# Patient Record
Sex: FEMALE | Race: WHITE | NOT HISPANIC OR LATINO | Employment: UNEMPLOYED | URBAN - METROPOLITAN AREA
[De-identification: names, ages, dates, MRNs, and addresses within clinical notes are randomized per-mention and may not be internally consistent; named-entity substitution may affect disease eponyms.]

---

## 2017-11-24 ENCOUNTER — APPOINTMENT (EMERGENCY)
Dept: RADIOLOGY | Facility: HOSPITAL | Age: 42
End: 2017-11-24
Payer: COMMERCIAL

## 2017-11-24 ENCOUNTER — HOSPITAL ENCOUNTER (EMERGENCY)
Facility: HOSPITAL | Age: 42
Discharge: HOME | End: 2017-11-24
Attending: EMERGENCY MEDICINE
Payer: COMMERCIAL

## 2017-11-24 VITALS
WEIGHT: 245 LBS | TEMPERATURE: 98.4 F | SYSTOLIC BLOOD PRESSURE: 147 MMHG | OXYGEN SATURATION: 96 % | DIASTOLIC BLOOD PRESSURE: 86 MMHG | HEART RATE: 89 BPM | RESPIRATION RATE: 16 BRPM

## 2017-11-24 DIAGNOSIS — M79.672 LEFT FOOT PAIN: Primary | ICD-10-CM

## 2017-11-24 PROCEDURE — 73630 X-RAY EXAM OF FOOT: CPT

## 2017-11-24 PROCEDURE — 99283 EMERGENCY DEPT VISIT LOW MDM: CPT

## 2017-11-24 NOTE — DISCHARGE INSTRUCTIONS
Arthralgia   WHAT YOU NEED TO KNOW:   Arthralgia is pain in one or more joints, with no inflammation  It may be short-term and get better within 6 to 8 weeks  Arthralgia can be an early sign of arthritis  Arthralgia may be caused by a medical condition, such as a hormone disorder or a tumor  It may also be caused by an infection or injury  DISCHARGE INSTRUCTIONS:   Medicines: The following medicines may  be ordered for you:  · Acetaminophen  decreases pain  Ask how much to take and how often to take it  Follow directions  Acetaminophen can cause liver damage if not taken correctly  · NSAIDs  decrease pain and prevent swelling  Ask your healthcare provider which medicine is right for you  Ask how much to take and when to take it  Take as directed  NSAIDs can cause stomach bleeding and kidney problems if not taken correctly  · Pain relief cream  decreases pain  Use this cream as directed  · Take your medicine as directed  Contact your healthcare provider if you think your medicine is not helping or if you have side effects  Tell him of her if you are allergic to any medicine  Keep a list of the medicines, vitamins, and herbs you take  Include the amounts, and when and why you take them  Bring the list or the pill bottles to follow-up visits  Carry your medicine list with you in case of an emergency  Follow up with your healthcare provider or specialist as directed:  Write down your questions so you remember to ask them during your visits  Self-care:   · Apply heat  to help decrease pain  Use a heating pad or heat wrap  Apply heat for 20 to 30 minutes every 2 hours for as many days as directed  · Rest  as much as possible  Avoid activities that cause joint pain  · Apply ice  to help decrease swelling and pain  Ice may also help prevent tissue damage  Use an ice pack, or put crushed ice in a plastic bag   Cover it with a towel and place it on your painful joint for 15 to 20 minutes every hour or as directed  · Support  the joint with a brace or elastic wrap as directed  · Elevate  your joint above the level of your heart as often as you can to help decrease swelling and pain  Prop your painful joint on pillows or blankets to keep it elevated comfortably  · Lose weight  if you are overweight  Extra weight can put pressure on your joints and cause more pain  Ask your healthcare provider how much you should weigh  Ask him to help you create a weight loss plan  · Exercise  regularly to help improve joint movement and to decrease pain  Ask about the best exercise plan for you  Low-impact exercises can help take the pressure off your joints  Examples are walking, swimming, and water aerobics  Physical therapy:  A physical therapist teaches you exercises to help improve movement and strength, and to decrease pain  Ask your healthcare provider if physical therapy is right for you  Contact your healthcare provider or specialist if:   · You have a fever  · You continue to have joint pain that cannot be relieved with heat, ice, or medicine  · You have pain and inflammation around your joint  · You have questions or concerns about your condition or care  Return to the emergency department if:   · You have sudden, severe pain when you move your joint  · You have a fever and shaking chills  · You cannot move your joint  · You lose feeling on the side of your body where you have the painful joint  © 2017 [address]  Information is for End User's use only and may not be sold, redistributed or otherwise used for commercial purposes  All illustrations and images included in CareNotes® are the copyrighted property of [employer]  or [employer]h Foley  The above information is an  only  It is not intended as medical advice for individual conditions or treatments   Talk to your doctor, nurse or pharmacist before following any medical regimen to see if it is safe and effective for you  Metatarsalgia   WHAT YOU NEED TO KNOW:   What is metatarsalgia? Metatarsalgia is pain in the ball of your foot, near your second, third, and fourth toes  What causes or increases my risk for metatarsalgia? · Playing weight-bearing sports, such as tennis or running    · Wearing high heels, or narrow or tight shoes    · Being overweight    · Rheumatoid arthritis, osteoarthritis, or gout    · Foot deformities such as hammer toe or Achilles tendon problems    · Trauma such as a tear in the area where you have pain, or a stress fracture in your foot  What are the signs and symptoms of metatarsalgia? Symptoms usually develop over time, but you may have sudden pain from an injury  You may have any of the following:  · Pain at the ball of your foot or near your toes that gets worse when you walk or stand, especially on hard surfaces    · Pain during exercises such as running    · Sharp or shooting pain in your toes that may get worse when you flex your toes    · Tingling or numbness in your toes    · Feeling like you are walking over rocks, or that you have a bruise on your toe    · A change in the way you walk because you try to avoid putting pressure on the ball of your foot  How is metatarsalgia diagnosed and treated? Your healthcare provider will examine your feet and legs as you stand and walk  X-ray, CT, or ultrasound pictures may show a problem with your foot, such as a fracture  You may be given contrast liquid to help foot problems show up better in the pictures  Tell the healthcare provider if you have ever had an allergic reaction to contrast liquid  The cause of your metatarsalgia will be treated, if possible  You may also need any of the following:  · NSAIDs , such as ibuprofen, help decrease swelling, pain, and fever  This medicine is available with or without a doctor's order  NSAIDs can cause stomach bleeding or kidney problems in certain people   If you take blood thinner medicine, always ask if NSAIDs are safe for you  Always read the medicine label and follow directions  Do not give these medicines to children under 10months of age without direction from your child's healthcare provider  · Ultrasound  may be used to relieve your pain  Sound waves from the ultrasound can help send heat deeper into your tissues  · A steroid injection  may help decrease inflammation  · Surgery  may be needed if other treatments do not work  Surgery may be used to align the bones near your toes  You may also need surgery to fix a problem such as hammertoe  What can I do to manage or prevent metatarsalgia? · Rest your foot  If you play sports, you may not be able to do weight-bearing exercises  Examples include swimming and bike riding  Ask your healthcare provider which exercises are safe for you  · Apply ice as directed  Ice helps reduce pain and swelling  Use an ice pack, or put crushed ice in a plastic bag  Cover the pack or bag with a towel before you apply it to your foot  Apply ice for 15 to 20 minutes every hour, or as directed  · Use a cane or crutch if directed  These devices may help take pressure off your foot while it heals  · Wear proper shoes  Do not wear shoes that are narrow or tight  You may need to wear shoes that are wider than you usually wear  Choose shoes that do not have a raised heel  Shock-absorbing shoes can help prevent injury  These shoes will have extra support under your feet and toes  You can also add shoe cushions inside your shoes or to the bottoms of your feet, near your toes  The cushions may provide more support and make walking or standing more comfortable  Arch supports may help take pressure off your toes  · Reach or maintain a healthy weight  Extra weight can put pressure on your feet  Talk to your healthcare provider about a healthy weight for you   Your provider can help you create a safe weight loss plan if you are overweight  · Go to physical therapy if directed  A physical therapist can help improve your strength and range of motion  The therapist can also help you improve the way you walk to prevent metatarsalgia from happening again  Your therapist can also teach you exercises to help relieve your pain  When should I contact my healthcare provider? · You develop knee, back, or hip pain  · You have more pain or redness in the foot  · You have questions or concerns about your condition or care  CARE AGREEMENT:   You have the right to help plan your care  Learn about your health condition and how it may be treated  Discuss treatment options with your caregivers to decide what care you want to receive  You always have the right to refuse treatment  The above information is an  only  It is not intended as medical advice for individual conditions or treatments  Talk to your doctor, nurse or pharmacist before following any medical regimen to see if it is safe and effective for you  © 2017 [address] Information is for End User's use only and may not be sold, redistributed or otherwise used for commercial purposes  All illustrations and images included in CareNotes® are the copyrighted property of A [employer] M [employer] Inc  or [employer] F[employer]

## 2017-11-24 NOTE — ED PROVIDER NOTES
History  Chief Complaint   Patient presents with    Foot Pain     Left foot pain and swelling gradually worsening for 4-5 days  Pain across the top of the foot- walks a lot at work [employer])     47-year-old female presents for evaluation of left foot pain that started 5 days ago  Patient reports that she walks around a lot work and [employer] her feet around"  Reports pain with walking as well as without movement  Denies fall or injury  States that she injured her right foot and on occasion compensates by using her left foot  Patient noted swelling over the dorsum of her foot, pain mostly in the 2 - 4 th metatarsals the radiates up to her ankle  Denies numbness, paresthesias, weakness, injury, open wounds, f/c, n/v              None       History reviewed  No pertinent past medical history  History reviewed  No pertinent surgical history  History reviewed  No pertinent family history  I have reviewed and agree with the history as documented  Social History   Substance Use Topics    Smoking status: Current Every Day Smoker     Packs/day: 0 50     Types: Cigarettes    Smokeless tobacco: Never Used    Alcohol use No        Review of Systems   Constitutional: Negative for activity change, chills and fever  Gastrointestinal: Negative for nausea and vomiting  Musculoskeletal: Positive for arthralgias, joint swelling and myalgias  Skin: Negative          Physical Exam  ED Triage Vitals   Temperature Pulse Respirations Blood Pressure SpO2   11/24/17 0818 11/24/17 0818 11/24/17 0818 11/24/17 0821 11/24/17 0818   98 4 °F (36 9 °C) 89 16 147/86 96 %      Temp Source Heart Rate Source Patient Position - Orthostatic VS BP Location FiO2 (%)   11/24/17 0818 -- -- -- --   Temporal          Pain Score       11/24/17 0818       Worst Possible Pain           Orthostatic Vital Signs  Vitals:    11/24/17 0818 11/24/17 0821   BP:  147/86   Pulse: 89        Physical Exam   Constitutional: She appears well-developed and well-nourished  No distress  Pulmonary/Chest: Effort normal and breath sounds normal    Musculoskeletal: Normal range of motion  She exhibits no edema, tenderness or deformity  Left foot: There is swelling  There is normal range of motion, no bony tenderness, normal capillary refill and no laceration  Feet:    Pt able to ambulate in room  No limping noted  F AROM of ankles and toes b/l  NVI  DP pulses 2+ Swelling noted, however no discoloration, wounds  Skin: Skin is warm  Capillary refill takes less than 2 seconds  No rash noted  She is not diaphoretic  No erythema  Psychiatric: She has a normal mood and affect  Vitals reviewed  ED Medications  Medications - No data to display    Diagnostic Studies  Results Reviewed     None                 XR foot 3+ views LEFT   Final Result by Sarthak Salamanca MD (11/24 9836)      No acute osseous abnormality  Workstation performed: [id number]                    Procedures  Procedures       Phone Contacts  ED Phone Contact    ED Course  ED Course                                MDM  Number of Diagnoses or Management Options  Diagnosis management comments: 68-year-old female presents for evaluation of left elbow pain that started 5 days ago  Patient is well-appearing in room, vital signs stable  Patient is able to ambulate  X-ray results negative in any fractures  Will advise RICE, motrin prn for pain  F/u with podiatry if sxs persist  Pt understands and agrees to plan          Amount and/or Complexity of Data Reviewed  Tests in the radiology section of CPT®: ordered and reviewed    Patient Progress  Patient progress: stable    CritCare Time    Disposition  Final diagnoses:   Left foot pain     Time reflects when diagnosis was documented in both MDM as applicable and the Disposition within this note     Time User Action Codes Description Comment    11/24/2017  9:27 AM Arian Stewart Add [U82 112] Left foot pain       ED Disposition     ED Disposition Condition Comment    Discharge  Cassandra Ruby discharge to home/self care  Condition at discharge: Good        Follow-up Information     Follow up With Specialties Details Why [id number]  [facility] Schedule an appointment as soon as possible for a visit If symptoms worsen [place] [phone]        Patient's Medications    No medications on file     No discharge procedures on file      ED Provider  Electronically Signed by           Savannah Dong PA-C  11/24/17 5589

## 2018-04-14 ENCOUNTER — APPOINTMENT (EMERGENCY)
Dept: RADIOLOGY | Facility: HOSPITAL | Age: 43
End: 2018-04-14
Payer: COMMERCIAL

## 2018-04-14 ENCOUNTER — HOSPITAL ENCOUNTER (EMERGENCY)
Facility: HOSPITAL | Age: 43
Discharge: HOME | End: 2018-04-14
Attending: EMERGENCY MEDICINE | Admitting: EMERGENCY MEDICINE
Payer: COMMERCIAL

## 2018-04-14 VITALS
WEIGHT: 254 LBS | RESPIRATION RATE: 18 BRPM | DIASTOLIC BLOOD PRESSURE: 99 MMHG | HEART RATE: 82 BPM | OXYGEN SATURATION: 98 % | SYSTOLIC BLOOD PRESSURE: 167 MMHG | TEMPERATURE: 98.8 F

## 2018-04-14 DIAGNOSIS — M19.90 OSTEOARTHRITIS: Primary | ICD-10-CM

## 2018-04-14 DIAGNOSIS — M25.562 LEFT KNEE PAIN: ICD-10-CM

## 2018-04-14 PROCEDURE — 73564 X-RAY EXAM KNEE 4 OR MORE: CPT

## 2018-04-14 PROCEDURE — 99283 EMERGENCY DEPT VISIT LOW MDM: CPT

## 2018-04-14 RX ORDER — FAMOTIDINE 20 MG/1
20 TABLET, FILM COATED ORAL 2 TIMES DAILY
Qty: 30 TABLET | Refills: 0 | Status: SHIPPED | OUTPATIENT
Start: 2018-04-14 | End: 2018-11-20

## 2018-04-14 RX ORDER — MELOXICAM 15 MG/1
15 TABLET ORAL DAILY
Qty: 15 TABLET | Refills: 0 | Status: SHIPPED | OUTPATIENT
Start: 2018-04-14 | End: 2018-11-20

## 2018-04-14 RX ORDER — OXYCODONE HYDROCHLORIDE AND ACETAMINOPHEN 5; 325 MG/1; MG/1
1 TABLET ORAL EVERY 6 HOURS PRN
Qty: 15 TABLET | Refills: 0 | Status: SHIPPED | OUTPATIENT
Start: 2018-04-14 | End: 2018-11-20

## 2018-04-14 RX ORDER — OXYCODONE HYDROCHLORIDE AND ACETAMINOPHEN 5; 325 MG/1; MG/1
1 TABLET ORAL ONCE
Status: COMPLETED | OUTPATIENT
Start: 2018-04-14 | End: 2018-04-14

## 2018-04-14 RX ORDER — LIDOCAINE 50 MG/G
1 PATCH TOPICAL ONCE
Status: DISCONTINUED | OUTPATIENT
Start: 2018-04-14 | End: 2018-04-15 | Stop reason: HOSPADM

## 2018-04-14 RX ORDER — LIDOCAINE 50 MG/G
1 PATCH TOPICAL DAILY
Qty: 15 PATCH | Refills: 0 | Status: SHIPPED | OUTPATIENT
Start: 2018-04-14 | End: 2018-11-20

## 2018-04-14 RX ADMIN — OXYCODONE HYDROCHLORIDE AND ACETAMINOPHEN 1 TABLET: 5; 325 TABLET ORAL at 22:16

## 2018-04-14 RX ADMIN — LIDOCAINE 1 PATCH: 50 PATCH TOPICAL at 22:15

## 2018-04-15 NOTE — ED PROVIDER NOTES
History  Chief Complaint   Patient presents with    Knee Pain     With left knee pain x3 weeks states felt like she extended it when steeping down       History provided by:  Patient   used: No    Leg Pain   Location:  Knee  Time since incident:  3 weeks  Injury: no    Knee location:  L knee  Pain details:     Quality:  Aching, pressure and throbbing    Radiates to:  Does not radiate    Severity:  Moderate    Onset quality:  Gradual    Timing:  Constant    Progression:  Worsening  Chronicity:  New  Dislocation: no    Foreign body present:  No foreign bodies  Prior injury to area:  No  Relieved by:  Nothing  Worsened by:  Extension (inactivity)  Ineffective treatments:  Acetaminophen, ice, elevation, heat, movement, NSAIDs and rest  Associated symptoms: decreased ROM and swelling    Associated symptoms: no back pain, no fever, no muscle weakness and no tingling    Risk factors: no frequent fractures, no known bone disorder and no recent illness        None       History reviewed  No pertinent past medical history  Past Surgical History:   Procedure Laterality Date    ABDOMINAL SURGERY       SECTION      CHOLECYSTECTOMY         History reviewed  No pertinent family history  I have reviewed and agree with the history as documented  Social History   Substance Use Topics    Smoking status: Current Every Day Smoker     Packs/day: 0 50     Types: Cigarettes    Smokeless tobacco: Never Used    Alcohol use No        Review of Systems   Constitutional: Negative for chills and fever  Cardiovascular: Negative for leg swelling  Musculoskeletal: Positive for arthralgias and joint swelling  Negative for back pain  Skin: Negative for color change, pallor, rash and wound  Allergic/Immunologic: Negative for immunocompromised state  Neurological: Negative for weakness and numbness  All other systems reviewed and are negative        Physical Exam  ED Triage Vitals [18] Temperature Pulse Respirations Blood Pressure SpO2   98 8 °F (37 1 °C) 89 20 (!) 183/86 98 %      Temp Source Heart Rate Source Patient Position - Orthostatic VS BP Location FiO2 (%)   Oral Monitor Sitting Right arm --      Pain Score       Worst Possible Pain           Orthostatic Vital Signs  Vitals:    04/14/18 2042 04/14/18 2210   BP: (!) 183/86 167/99   Pulse: 89 82   Patient Position - Orthostatic VS: Sitting Sitting       Physical Exam   Constitutional: She is oriented to person, place, and time  She appears well-developed and well-nourished  No distress  HENT:   Head: Normocephalic and atraumatic  Eyes: Right eye exhibits no discharge  Left eye exhibits no discharge  Cardiovascular: Intact distal pulses  Pulmonary/Chest: Effort normal  No stridor  Musculoskeletal: She exhibits tenderness  She exhibits no edema or deformity  Left knee: She exhibits decreased range of motion, swelling and effusion  She exhibits no ecchymosis, no deformity, no erythema and normal patellar mobility  Tenderness found  Medial joint line, lateral joint line and patellar tendon tenderness noted  Neurological: She is alert and oriented to person, place, and time  Skin: Skin is warm and dry  She is not diaphoretic  Psychiatric: She has a normal mood and affect  Nursing note and vitals reviewed  ED Medications  Medications   oxyCODONE-acetaminophen (PERCOCET) 5-325 mg per tablet 1 tablet (1 tablet Oral Given 4/14/18 2216)       Diagnostic Studies  Results Reviewed     None                 XR knee 4+ vw left injury   Final Result by Edmar Rg MD (04/14 2142)         1  No acute osseous abnormality  2   Degenerative changes as described              Workstation performed: [id number]                    Procedures  Procedures       Phone Contacts  ED Phone Contact    ED Course  ED Course                                MDM  Number of Diagnoses or Management Options  Left knee pain: new and requires workup  Osteoarthritis: new and requires workup  Diagnosis management comments: Patient is a 57-year-old female that presents for left knee pain that is been ongoing for the past 3 weeks  It is worsening now  No known injury  X-ray shows osteoarthritis  Patient does have a small effusion on x-ray  Her joint is slightly swollen compared to the opposite  Unable to do full ligament testing because of pain  No calf tenderness or lower extremity edema  Patellar tendon appears intact  Patient can bend her leg but it does cause some pain  Quadriceps tendon appears intact as well  Talked to the patient at length about further treatment and follow-up  This seems to be related to her arthritis but I did discuss other problems that can cause internal derangement such as cartilage and ligament injuries  No clinical evidence of a DVT  Will start with supportive treatment and have her follow up with Orthopedics  Patient states that it is hard for her to extend her knee which is probably from her small effusion  I do not feel that a knee immobilizer will give her benefit  She states that her pain seems to improve as she walks around and that hurts again  This history fits with osteoarthritis that is seen on the x-ray         Amount and/or Complexity of Data Reviewed  Tests in the radiology section of CPT®: ordered and reviewed  Independent visualization of images, tracings, or specimens: yes    Patient Progress  Patient progress: stable    CritCare Time    Disposition  Final diagnoses:   Osteoarthritis   Left knee pain     Time reflects when diagnosis was documented in both MDM as applicable and the Disposition within this note     Time User Action Codes Description Comment    4/14/2018 10:05 PM Brad Casey Add [M19 90] Osteoarthritis     4/14/2018 10:06 PM Brad Casey Add [M25 562] Left knee pain       ED Disposition     ED Disposition Condition Comment    Discharge  Carolyne Murphy discharge to home/self care  Condition at discharge: Good        Follow-up Information     Follow up With Specialties Details Why [address] [facility] Call in 1 day If symptoms persist, To schedule an appointment as soon as you can [place] [phone]        Discharge Medication List as of 4/14/2018 10:07 PM      START taking these medications    Details   famotidine (PEPCID) 20 mg tablet Take 1 tablet (20 mg total) by mouth 2 (two) times a day, Starting Sat 4/14/2018, Print      lidocaine (LIDODERM) 5 % Place 1 patch on the skin daily Remove & Discard patch within 12 hours or as directed by MD, Starting Sat 4/14/2018, Print      meloxicam (MOBIC) 15 mg tablet Take 1 tablet (15 mg total) by mouth daily for 15 days, Starting Sat 4/14/2018, Until Sun 4/29/2018, Print      oxyCODONE-acetaminophen (PERCOCET) 5-325 mg per tablet Take 1 tablet by mouth every 6 (six) hours as needed for moderate pain Max Daily Amount: 4 tablets, Starting Sat 4/14/2018, Print           No discharge procedures on file      ED Provider  Electronically Signed by           Jaci Palma DO  04/15/18 0331

## 2018-04-15 NOTE — DISCHARGE INSTRUCTIONS
Arthralgia   WHAT YOU NEED TO KNOW:   Arthralgia is pain in one or more joints, with no inflammation  It may be short-term and get better within 6 to 8 weeks  Arthralgia can be an early sign of arthritis  Arthralgia may be caused by a medical condition, such as a hormone disorder or a tumor  It may also be caused by an infection or injury  DISCHARGE INSTRUCTIONS:   Medicines: The following medicines may  be ordered for you:  · Acetaminophen  decreases pain  Ask how much to take and how often to take it  Follow directions  Acetaminophen can cause liver damage if not taken correctly  · NSAIDs  decrease pain and prevent swelling  Ask your healthcare provider which medicine is right for you  Ask how much to take and when to take it  Take as directed  NSAIDs can cause stomach bleeding and kidney problems if not taken correctly  · Pain relief cream  decreases pain  Use this cream as directed  · Take your medicine as directed  Contact your healthcare provider if you think your medicine is not helping or if you have side effects  Tell him of her if you are allergic to any medicine  Keep a list of the medicines, vitamins, and herbs you take  Include the amounts, and when and why you take them  Bring the list or the pill bottles to follow-up visits  Carry your medicine list with you in case of an emergency  Follow up with your healthcare provider or specialist as directed:  Write down your questions so you remember to ask them during your visits  Self-care:   · Apply heat  to help decrease pain  Use a heating pad or heat wrap  Apply heat for 20 to 30 minutes every 2 hours for as many days as directed  · Rest  as much as possible  Avoid activities that cause joint pain  · Apply ice  to help decrease swelling and pain  Ice may also help prevent tissue damage  Use an ice pack, or put crushed ice in a plastic bag   Cover it with a towel and place it on your painful joint for 15 to 20 minutes every hour or as directed  · Support  the joint with a brace or elastic wrap as directed  · Elevate  your joint above the level of your heart as often as you can to help decrease swelling and pain  Prop your painful joint on pillows or blankets to keep it elevated comfortably  · Lose weight  if you are overweight  Extra weight can put pressure on your joints and cause more pain  Ask your healthcare provider how much you should weigh  Ask him to help you create a weight loss plan  · Exercise  regularly to help improve joint movement and to decrease pain  Ask about the best exercise plan for you  Low-impact exercises can help take the pressure off your joints  Examples are walking, swimming, and water aerobics  Physical therapy:  A physical therapist teaches you exercises to help improve movement and strength, and to decrease pain  Ask your healthcare provider if physical therapy is right for you  Contact your healthcare provider or specialist if:   · You have a fever  · You continue to have joint pain that cannot be relieved with heat, ice, or medicine  · You have pain and inflammation around your joint  · You have questions or concerns about your condition or care  Return to the emergency department if:   · You have sudden, severe pain when you move your joint  · You have a fever and shaking chills  · You cannot move your joint  · You lose feeling on the side of your body where you have the painful joint  © 2017 [address]  Information is for End User's use only and may not be sold, redistributed or otherwise used for commercial purposes  All illustrations and images included in CareNotes® are the copyrighted property of [employer]  or Khanh Foley  The above information is an  only  It is not intended as medical advice for individual conditions or treatments   Talk to your doctor, nurse or pharmacist before following any medical regimen to see if it is safe and effective for you  Knee Pain   WHAT YOU NEED TO KNOW:   Knee pain may start suddenly, or it may be a long-term problem  You may have pain on the side, front, or back of your knee  You may have knee stiffness and swelling  You may hear popping sounds or feel like your knee is giving way or locking up as you walk  You may feel pain when you sit, stand, walk, or climb up and down stairs  Knee pain can be caused by conditions such as obesity, inflammation, or strains or tears in ligaments or tendons  DISCHARGE INSTRUCTIONS:   Follow up with your healthcare provider within 24 hours or as directed: You may need follow-up treatments, such as steroid injections to decrease pain  Write down your questions so you remember to ask them during your visits  Self-care:   · Rest  your knee so it can heal  Limit activities that increase your pain  · Ice  can help reduce swelling  Wrap ice in a towel and put it on your knee for as long and as often as directed  · Compression  with a brace or bandage can help reduce swelling  Use a brace or bandage only as directed  · Elevation  helps decrease pain and swelling  Elevate your knee while you are sitting or lying down  Prop your leg on pillows to keep your knee above the level of your heart  Medicines:   · NSAIDs  help decrease swelling and pain or fever  This medicine is available with or without a doctor's order  NSAIDs can cause stomach bleeding or kidney problems in certain people  If you take blood thinner medicine, always ask your healthcare provider if NSAIDs are safe for you  Always read the medicine label and follow directions  · Acetaminophen  decreases pain and fever  It is available without a doctor's order  Ask how much to take and when to take it  Follow directions  Acetaminophen can cause liver damage if not taken correctly  · Take your medicine as directed    Contact your healthcare provider if you think your medicine is not helping or if you have side effects  Tell him or her if you are allergic to any medicine  Keep a list of the medicines, vitamins, and herbs you take  Include the amounts, and when and why you take them  Bring the list or the pill bottles to follow-up visits  Carry your medicine list with you in case of an emergency  Exercise as directed: You may need to see a physical therapist or do recommended exercises to improve movement and decrease your pain  You may be directed to walk, swim, or ride a bike  Follow your exercise plan exactly as directed to avoid further injury  Contact your healthcare provider if:   · You have questions or concerns about your condition or care  Return to the emergency department if:   · Your pain is worse, even after treatment  · You cannot bend or straighten your leg completely  · The swelling around your knee does not go down even with treatment  · Your knee is painful and hot to the touch  © 2017 [address] Information is for End User's use only and may not be sold, redistributed or otherwise used for commercial purposes  All illustrations and images included in CareNotes® are the copyrighted property of [employer]  or Khanh Foley  The above information is an  only  It is not intended as medical advice for individual conditions or treatments  Talk to your doctor, nurse or pharmacist before following any medical regimen to see if it is safe and effective for you  Osteoarthritis   WHAT YOU NEED TO KNOW:   What is osteoarthritis? Osteoarthritis (OA) occurs when cartilage (tissue that cushions a joint) wears away slowly and causes the bones to rub together  OA is a long-term condition that often affects the hands, neck, lower back, knees, and hips  OA is also called arthrosis or degenerative joint disease  What increases my risk for OA?    · Age older than 48 years    · A family history of OA    · Obesity, high blood pressure, or high blood sugar    · A joint injury or infection    · Repetitive movements of your joints at work or during sports  What are the signs and symptoms of OA? · Joint pain that gets worse when you move the joint     · Joint stiffness that decreases after you move the joint     · Decreased range of movement     · Hard, bony enlargement on your fingers or toes    · A grinding or cracking sound when you move your joint  How is OA diagnosed? X-rays are pictures of the bones in your joint  Contrast liquid may be injected into your joint before the x-ray  The liquid will help your joint show up better on the x-ray  Tell the healthcare provider if you have ever had an allergic reaction to contrast liquid  How is OA treated? The goals of treatment are to decrease pain, increase strength, and improve movement  You may need any of the following:  · Medicines:      ¨ Acetaminophen  is used to decrease pain  It is available without a doctor's order  Ask how much to take and how often to take it  Follow directions  Acetaminophen can cause liver damage if not taken correctly  ¨ NSAIDs , such as ibuprofen, help decrease swelling, pain, and fever  This medicine is available with or without a doctor's order  NSAIDs can cause stomach bleeding or kidney problems in certain people  If you take blood thinner medicine, always ask your healthcare provider if NSAIDs are safe for you  Always read the medicine label and follow directions  ¨ Capsaicin cream  may help decrease pain in your joint  ¨ Prescription pain medicine  may be given to decrease severe pain if other medicines do not work  Take the medicine as directed  Do not wait until the pain is severe before you take your medicine  ¨ A steroid injection  may be given if your symptoms get worse  · Physical therapy  may be used to teach you exercises to help improve movement and strength, and to decrease pain       · Surgery  may be needed if other treatments do not work  How can I manage my symptoms? · Stay active  Physical activity may reduce your pain and improve your ability to do daily activities  Avoid activities that cause pain  Ask your healthcare provider what type of exercise would be best for you  · Maintain a healthy weight  This helps decrease the strain on the joints in your back, hips, knees, ankles, and feet  Ask your healthcare provider how much you should weigh  Ask him to help you create a weight loss plan if you are overweight  · Use heat or ice  on your joints as directed  Heat and ice help decrease pain, swelling, and muscle spasms  Use a heating pad on a low setting or take a warm bath  Use an ice pack, or put crushed ice in a plastic bag  Cover it with a towel  · Massage  the muscles around the joint to relieve pain and stiffness  · Use a cane, crutches, or a walker  to protect and relieve pressure on your ankle, knee, and hip joints  You may also be prescribed shoe inserts to decrease pressure in your joints  · Wear flat or low-heeled shoes  This will help decrease pain and reduce pressure on your ankle, knee, and hip joints  When should I seek immediate care? · You have severe pain  · You cannot move your joint  When should I contact my healthcare provider? · You have a fever  · Your joint is red and tender  · You have questions or concerns about your condition or care  CARE AGREEMENT:   You have the right to help plan your care  Learn about your health condition and how it may be treated  Discuss treatment options with your caregivers to decide what care you want to receive  You always have the right to refuse treatment  The above information is an  only  It is not intended as medical advice for individual conditions or treatments  Talk to your doctor, nurse or pharmacist before following any medical regimen to see if it is safe and effective for you    © 2017 [facility] is for End User's use only and may not be sold, redistributed or otherwise used for commercial purposes  All illustrations and images included in CareNotes® are the copyrighted property of [employer]  or [address]  Osteoarthritis   WHAT YOU NEED TO KNOW:   Osteoarthritis (OA) occurs when cartilage (tissue that cushions a joint) wears away slowly and causes the bones to rub together  OA is a long-term condition that often affects the hands, neck, lower back, knees, and hips  OA is also called arthrosis or degenerative joint disease  DISCHARGE INSTRUCTIONS:   Seek care immediately if:   · You have severe pain  · You cannot move your joint  Contact your healthcare provider if:   · You have a fever  · Your joint is red and tender  · You have questions or concerns about your condition or care  Medicines:   · Acetaminophen  is used to decrease pain  It is available without a doctor's order  Ask how much to take and how often to take it  Follow directions  Acetaminophen can cause liver damage if not taken correctly  · NSAIDs , such as ibuprofen, help decrease swelling, pain, and fever  This medicine is available with or without a doctor's order  NSAIDs can cause stomach bleeding or kidney problems in certain people  If you take blood thinner medicine, always ask your healthcare provider if NSAIDs are safe for you  Always read the medicine label and follow directions  · Capsaicin cream  may help decrease pain in your joint  · Prescription pain medicine  may be given to decrease severe pain if other medicines do not work  Take the medicine as directed  Do not wait until the pain is severe before you take your medicine  · Take your medicine as directed  Contact your healthcare provider if you think your medicine is not helping or if you have side effects  Tell him or her if you are allergic to any medicine   Keep a list of the medicines, vitamins, and herbs you take  Include the amounts, and when and why you take them  Bring the list or the pill bottles to follow-up visits  Carry your medicine list with you in case of an emergency  Follow up with your healthcare provider as directed:  Write down your questions so you remember to ask them during your visits  Go to physical therapy as directed:  A physical therapist teaches you exercises to help improve movement and strength, and to decrease pain in your joints  The exercises also help lower your risk for loss of function  Manage your OA:   · Stay active  Physical activity may reduce your pain and improve your ability to do daily activities  Avoid activities that cause pain  Ask your healthcare provider what type of exercise would be best for you  · Maintain a healthy weight  This helps decrease the strain on the joints in your back, hips, knees, ankles, and feet  Ask your healthcare provider how much you should weigh  Ask him to help you create a weight loss plan if you are overweight  · Use heat or ice  on your joints as directed  Heat and ice help decrease pain, swelling, and muscle spasms  Use a heating pad on a low setting or take a warm bath  Use an ice pack, or put crushed ice in a plastic bag  Cover it with a towel  · Massage  the muscles around the joint to relieve pain and stiffness  · Use a cane, crutches, or a walker  to protect and relieve pressure on your ankle, knee, and hip joints  You may also be prescribed shoe inserts to decrease pressure in your joints  · Wear flat or low-heeled shoes  This will help decrease pain and reduce pressure on your ankle, knee, and hip joints  © 2017 [address] Information is for End User's use only and may not be sold, redistributed or otherwise used for commercial purposes  All illustrations and images included in CareNotes® are the copyrighted property of [employer]  or Khanh Foley    The above information is an  only  It is not intended as medical advice for individual conditions or treatments  Talk to your doctor, nurse or pharmacist before following any medical regimen to see if it is safe and effective for you

## 2018-04-17 ENCOUNTER — APPOINTMENT (OUTPATIENT)
Dept: RADIOLOGY | Facility: CLINIC | Age: 43
End: 2018-04-17
Payer: COMMERCIAL

## 2018-04-17 ENCOUNTER — OFFICE VISIT (OUTPATIENT)
Dept: OBGYN CLINIC | Facility: MEDICAL CENTER | Age: 43
End: 2018-04-17
Payer: COMMERCIAL

## 2018-04-17 VITALS
WEIGHT: 230 LBS | BODY MASS INDEX: 36.1 KG/M2 | HEIGHT: 67 IN | SYSTOLIC BLOOD PRESSURE: 150 MMHG | HEART RATE: 87 BPM | DIASTOLIC BLOOD PRESSURE: 96 MMHG

## 2018-04-17 DIAGNOSIS — S89.92XA KNEE INJURY, LEFT, INITIAL ENCOUNTER: Primary | ICD-10-CM

## 2018-04-17 DIAGNOSIS — S89.92XA KNEE INJURY, LEFT, INITIAL ENCOUNTER: ICD-10-CM

## 2018-04-17 DIAGNOSIS — M79.671 PAIN IN RIGHT FOOT: ICD-10-CM

## 2018-04-17 PROCEDURE — 73630 X-RAY EXAM OF FOOT: CPT

## 2018-04-17 PROCEDURE — 99204 OFFICE O/P NEW MOD 45 MIN: CPT | Performed by: FAMILY MEDICINE

## 2018-04-17 RX ORDER — ACETAMINOPHEN 325 MG/1
650 TABLET ORAL EVERY 6 HOURS PRN
COMMUNITY

## 2018-04-17 RX ORDER — IBUPROFEN 600 MG/1
TABLET ORAL EVERY 6 HOURS PRN
COMMUNITY
End: 2018-04-20

## 2018-04-17 NOTE — LETTER
April 17, 2018     Patient: Tania Haddad   YOB: 1975   Date of Visit: 4/17/2018       To Whom it May Concern:    Tania Haddad is under my professional care  She was seen in my office on 4/17/2018  She may return to work on 04/18/2018  I recommend light duty work restrictions to include nonweightbearing left lower extremity  I recommend sedentary job only  She is to have MRI performed and then re-evaluation to determine extent of her injury  Patient describes a me that she slipped in work approximately 2 5 weeks ago resulting in locking of her knee and worsening of knee pain   If you have any questions or concerns, please don't hesitate to call           Sincerely,          Ben Brothers III, DO        CC: Tania Haddad

## 2018-04-17 NOTE — PATIENT INSTRUCTIONS
Start hinged knee brace left knee   Remain nonweightbearing left knee     I recommend nonweightbearing for possible hidden fracture  Weightbearing on hidden fracture could result in movement of the fracture requiring surgery and worsening pain  Please have MRI performed    Please confirm with her primary care doctor whether not he can take anti-inflammatory medications or Tylenol due to her history of liver failure and kidney failure  Sometimes when people take anti-inflammatories such as ibuprofen or meloxicam this can result in significant kidney injury  Tylenol can result in liver failure  I will provide her a prescription for ibuprofen to take as needed sparingly once she confirm with her primary care doctor that you're able to take this  Educated risks of mixing NSAIDS (also known as non-steroidal anti-inflammatory pills) including advil, ibuprofen, motrin, aleve, naproxen, aspirin, and ibuprofen containing products with each other or with steroids (such as prednisone, medrol)  Explained risks of mixing these medications including stomach ulcer, severe internal bleeding, and kidney failure  Instructed not to take NSAIDS if have history of stomach ulcers, kidney issues, or hypertension  Instructed patient to use only one brand as prescribed  For naproxen, a maximum of 500 mg per dose every 12 hours and no more than two doses or 1,000mg per day  For Ibuprofen, a maximum of 800 mg per dose every 6 hours but no more than 3 doses or 2,400 mg per day  Never take these medications together  Never take these medications the same day  For severe pain and only if you have no liver problems, you may add Tylenol (also known as acetaminophen) maximum of 1,000  Mg per dose every 6 hours but no more 3 doses or 3,000 mg per day  Patient expressed understanding and agreed to plan

## 2018-04-17 NOTE — PROGRESS NOTES
1  Knee injury, left, initial encounter  MRI knee left  wo contrast    XR foot 3+ vw right   2  Pain in right foot       Orders Placed This Encounter   Procedures    MRI knee left  wo contrast    XR foot 3+ vw right        Imaging Studies (I personally reviewed results in PACS):  X-ray left knee 04/14/2018:  No acute osseous abnormality  X-ray right foot 04/17/2018:  No acute osseous abnormality    IMPRESSION:  left knee injury  Slipped off step in work with knee locking resulting in significant pain  Concomitant extensor tendinitis right foot secondary to antalgic gait associated with left knee pain    Differential diagnosis left knee pain:   Internal derangement of the knee  meniscal tear      Return for   Follow-up after MRI left knee  Patient Instructions   Start hinged knee brace left knee   Remain nonweightbearing left knee     I recommend nonweightbearing for possible hidden fracture  Weightbearing on hidden fracture could result in movement of the fracture requiring surgery and worsening pain  Please have MRI performed    Please confirm with her primary care doctor whether not he can take anti-inflammatory medications or Tylenol due to her history of liver failure and kidney failure  Sometimes when people take anti-inflammatories such as ibuprofen or meloxicam this can result in significant kidney injury  Tylenol can result in liver failure  I will provide her a prescription for ibuprofen to take as needed sparingly once she confirm with her primary care doctor that you're able to take this  Educated risks of mixing NSAIDS (also known as non-steroidal anti-inflammatory pills) including advil, ibuprofen, motrin, aleve, naproxen, aspirin, and ibuprofen containing products with each other or with steroids (such as prednisone, medrol)  Explained risks of mixing these medications including stomach ulcer, severe internal bleeding, and kidney failure   Instructed not to take NSAIDS if have history of stomach ulcers, kidney issues, or hypertension  Instructed patient to use only one brand as prescribed  For naproxen, a maximum of 500 mg per dose every 12 hours and no more than two doses or 1,000mg per day  For Ibuprofen, a maximum of 800 mg per dose every 6 hours but no more than 3 doses or 2,400 mg per day  Never take these medications together  Never take these medications the same day  For severe pain and only if you have no liver problems, you may add Tylenol (also known as acetaminophen) maximum of 1,000  Mg per dose every 6 hours but no more 3 doses or 3,000 mg per day  Patient expressed understanding and agreed to plan  CHIEF COMPLAINT:  Left knee pain    HPI:  Zeina Shah is a 38 y o  female  who presents for  Evaluation of left knee pain x3 weeks  She denies any specific injury at work that caused her pain began approximately 3 weeks ago; however, she states that while at work 2 5 weeks ago she did slip off a step causing her left knee to lock up resulting in worsening pain in her left knee  Patient presented emergency room 2 nights ago at [facility]  She had x-ray performed there showing mild osteoarthritis with small joint effusion  There is no evidence of fracture  Visit  04/17/2018: Today patient points to the medial aspect of her knee as source of pain  She was given lidocaine patches emergency room which she currently is wearing  There offer really minimal to no relief  She has stiffness as worsens when she is sitting for long periods of time resulting exacerbation of pain when she attempts to stand after sitting for long periods of time  She states that walking exacerbates her pain  Patient states that she does have episodes of giving out where she was fall to the ground  Associated symptoms do include mild swelling  She denies any redness of her knee  She denies any fevers or chills  associated symptoms do include right foot pain    She points to the dorsal aspect of her foot as source of pain  She states she has remote injury approximately 2 years ago for her foot  She denies any recent injury  Review of Systems   Constitutional: Negative for chills, fever and unexpected weight change  HENT: Negative for hearing loss, nosebleeds and sore throat  Eyes: Negative for pain, redness and visual disturbance  Respiratory: Negative for cough, shortness of breath and wheezing  Cardiovascular: Negative for chest pain, palpitations and leg swelling  Gastrointestinal: Negative for abdominal distention, nausea and vomiting  Endocrine: Negative for polydipsia and polyuria  Genitourinary: Negative for dysuria and hematuria  Skin: Negative for rash and wound  Neurological: Negative for dizziness, numbness and headaches  Psychiatric/Behavioral: Negative for decreased concentration and suicidal ideas  Following history reviewed and update:    History reviewed  No pertinent past medical history  Past Surgical History:   Procedure Laterality Date    ABDOMINAL SURGERY       SECTION      CHOLECYSTECTOMY       Social History   History   Alcohol Use No     History   Drug Use No     History   Smoking Status    Current Every Day Smoker    Packs/day: 0 50    Types: Cigarettes   Smokeless Tobacco    Never Used     Family History   Problem Relation Age of Onset    Cancer Mother     Diabetes Father     Heart disease Father     Cancer Father      No Known Allergies       Physical Exam  Constitutional: oriented to person, place, and time  well-developed and well-nourished  HENT:   Head: Normocephalic and atraumatic  Right Ear: External ear normal    Left Ear: External ear normal    Nose: Nose normal    Eyes: Conjunctivae are normal  Right eye exhibits no discharge  Left eye exhibits no discharge  No scleral icterus  Neck: Neck supple  Pulmonary/Chest: Effort normal  No respiratory distress     Neurological: alert and oriented to person, place, and time  Psychiatric:  normal mood and affect   behavior is normal  Judgment and thought content normal      Ortho Exam  LEFT KNEE:  Erythema: no  Swelling: no  Increased Warmth: no  Tenderness: ++ significant tenderness medial tibial plateau and medial joint line  Flexion: intact  Extension:  -10  Patellar Displacement:  Patellar Tilt:  Patellar Apprehension: negative  Patellar Grind Duke's: +  Lachman's: negative  Drawer: negative  Varus laxity: negative  Valgus laxity: + pain medially  Rebecca: negative   Thessaly Test:  Dial Test:     RIGHT ANKLE & FOOT EXAM  Observation  GAIT:  Nonweightbearing    Inspection  Erythema: no  Ecchymosis: no  Edema:  none      Tenderness  Proximal Fibula: no  (Maisonneuve frx)  AiTFL: no  (2cm proximal-medial to tip lateral malleolus 92% sens, 29% spec)  ATFL: no  CFL: no  PTFL: no  Achilles:  no  deltoid: No  Peroneal: no  Tibialis Anterior: no  Tibialis Posterior: no    Bony Tenderpoints:  Lateral Malleolus: no  Base of 5th MT: no  Medial Malleolus: no  Navicular: no  Talar dome: No  Dorsal mid and forefoot positive pain      ROM  Dorsiflexion: intact  Plantarflexion: intact    Muscle Strength  Pronation: intact without pain  Supination: intact without pain  Toe extension strength intact with reproduced pain dorsal and midfoot; no pain reproduced with isometric resisted contraction of toe flexors    Tib-Fib Squeeze: negative  (gaagefokaaop-xf-zreetvixyarmvr squeeze; 26% sens, 88% spec; rule in test)    Calcaneal Squeeze: negative    Dorsiflexion (+) ER Stress Test: negative  (reproduce ATiFL mech; 71% sens, 63% spec)          Procedures

## 2018-04-18 ENCOUNTER — HOSPITAL ENCOUNTER (OUTPATIENT)
Dept: MRI IMAGING | Facility: HOSPITAL | Age: 43
Discharge: HOME | End: 2018-04-18
Attending: FAMILY MEDICINE
Payer: COMMERCIAL

## 2018-04-18 DIAGNOSIS — S89.92XA KNEE INJURY, LEFT, INITIAL ENCOUNTER: ICD-10-CM

## 2018-04-18 PROCEDURE — 73721 MRI JNT OF LWR EXTRE W/O DYE: CPT

## 2018-04-20 ENCOUNTER — OFFICE VISIT (OUTPATIENT)
Dept: OBGYN CLINIC | Facility: MEDICAL CENTER | Age: 43
End: 2018-04-20
Payer: COMMERCIAL

## 2018-04-20 ENCOUNTER — TELEPHONE (OUTPATIENT)
Dept: OBGYN CLINIC | Facility: MEDICAL CENTER | Age: 43
End: 2018-04-20

## 2018-04-20 VITALS
SYSTOLIC BLOOD PRESSURE: 133 MMHG | HEART RATE: 79 BPM | DIASTOLIC BLOOD PRESSURE: 91 MMHG | WEIGHT: 230 LBS | BODY MASS INDEX: 36.1 KG/M2 | HEIGHT: 67 IN

## 2018-04-20 DIAGNOSIS — S83.242A ACUTE MEDIAL MENISCUS TEAR, LEFT, INITIAL ENCOUNTER: Primary | ICD-10-CM

## 2018-04-20 DIAGNOSIS — M79.671 PAIN IN RIGHT FOOT: Primary | ICD-10-CM

## 2018-04-20 PROCEDURE — 99213 OFFICE O/P EST LOW 20 MIN: CPT | Performed by: FAMILY MEDICINE

## 2018-04-20 RX ORDER — IBUPROFEN 800 MG/1
800 TABLET ORAL EVERY 8 HOURS PRN
Qty: 40 TABLET | Refills: 1 | Status: SHIPPED | OUTPATIENT
Start: 2018-04-20 | End: 2021-04-06 | Stop reason: HOSPADM

## 2018-04-20 NOTE — PROGRESS NOTES
1  Acute medial meniscus tear, left, initial encounter  ibuprofen (MOTRIN) 800 mg tablet     No orders of the defined types were placed in this encounter  Imaging Studies (I personally reviewed results in PACS):  X-ray left knee 04/14/2018:  No acute osseous abnormality  X-ray right foot 04/17/2018:  No acute osseous abnormality    IMPRESSION:  left medial meniscus tear acute   mild tricompartmental degenerative changes on x-ray AP view only   Mild to moderate arthritis on MRI with patches of cartilage deficiency  Date of Injury:  Approximately 03/30/2018   Follow-up interval 3 weeks       Return for   Follow-up with Dr Ashutosh Lezama  Patient Instructions   Patient follow up with surgeon to evaluation for possible operative intervention  I explained the patient that she has arthritis in her knee as mild-to-moderate but there are patches of full-thickness tear of the cartilage on MR I  Explained that she may not be a candidate for meniscal surgery  Patient agreed to see surgeon for consultation  Educated risks of mixing NSAIDS (also known as non-steroidal anti-inflammatory pills) including advil, ibuprofen, motrin, aleve, naproxen, aspirin, and ibuprofen containing products with each other or with steroids (such as prednisone, medrol)  Explained risks of mixing these medications including stomach ulcer, severe internal bleeding, and kidney failure  Instructed not to take NSAIDS if have history of stomach ulcers, kidney issues, or hypertension  Instructed patient to use only one brand as prescribed  For naproxen, a maximum of 500 mg per dose every 12 hours and no more than two doses or 1,000mg per day  For Ibuprofen, a maximum of 800 mg per dose every 6 hours but no more than 3 doses or 2,400 mg per day  Never take these medications together  Never take these medications the same day   For severe pain and only if you have no liver problems, you may add Tylenol (also known as acetaminophen) maximum of 1,000 Mg per dose every 6 hours but no more 3 doses or 3,000 mg per day  Patient expressed understanding and agreed to plan  CHIEF COMPLAINT:  Left knee pain    HPI:  Joy Higgins is a 38 y o  female  who presents for  Evaluation of left knee pain x3 weeks  She denies any specific injury at work that caused her pain began approximately 3 weeks ago; however, she states that while at work 2 5 weeks ago she did slip off a step causing her left knee to lock up resulting in worsening pain in her left knee  Patient presented emergency room 2 nights ago at [facility]  She had x-ray performed there showing mild osteoarthritis with small joint effusion  There is no evidence of fracture  Visit  04/17/2018: Today patient points to the medial aspect of her knee as source of pain  She was given lidocaine patches emergency room which she currently is wearing  There offer really minimal to no relief  She has stiffness as worsens when she is sitting for long periods of time resulting exacerbation of pain when she attempts to stand after sitting for long periods of time  She states that walking exacerbates her pain  Patient states that she does have episodes of giving out where she was fall to the ground  Associated symptoms do include mild swelling  She denies any redness of her knee  She denies any fevers or chills  associated symptoms do include right foot pain  She points to the dorsal aspect of her foot as source of pain  She states she has remote injury approximately 2 years ago for her foot  She denies any recent injury  Visit 04/20/2018: Today patient states that her pain is improved although only moderately  She has male to walk in her hinged knee brace but is using crutches occasionally  She denies any redness or increased warmth of her knee  She points to the medial aspect of her knee as source of her pain   She states she has feeling of clicking on the medial aspect of her knee     Review of Systems   Constitutional: Negative for chills, fever and unexpected weight change  HENT: Negative for hearing loss, nosebleeds and sore throat  Eyes: Negative for pain, redness and visual disturbance  Respiratory: Negative for cough, shortness of breath and wheezing  Cardiovascular: Negative for chest pain, palpitations and leg swelling  Gastrointestinal: Negative for abdominal distention, nausea and vomiting  Endocrine: Negative for polydipsia and polyuria  Genitourinary: Negative for dysuria and hematuria  Skin: Negative for rash and wound  Neurological: Negative for dizziness, numbness and headaches  Psychiatric/Behavioral: Negative for decreased concentration and suicidal ideas  Following history reviewed and update:    History reviewed  No pertinent past medical history  Past Surgical History:   Procedure Laterality Date    ABDOMINAL SURGERY       SECTION      CHOLECYSTECTOMY       Social History   History   Alcohol Use No     History   Drug Use No     History   Smoking Status    Current Every Day Smoker    Packs/day: 0 50    Types: Cigarettes   Smokeless Tobacco    Never Used     Family History   Problem Relation Age of Onset    Cancer Mother     Diabetes Father     Heart disease Father     Cancer Father      No Known Allergies       Physical Exam  Constitutional: oriented to person, place, and time  well-developed and well-nourished  HENT:   Head: Normocephalic and atraumatic  Right Ear: External ear normal    Left Ear: External ear normal    Nose: Nose normal    Eyes: Conjunctivae are normal  Right eye exhibits no discharge  Left eye exhibits no discharge  No scleral icterus  Neck: Neck supple  Pulmonary/Chest: Effort normal  No respiratory distress  Neurological: alert and oriented to person, place, and time  Psychiatric:  normal mood and affect   behavior is normal  Judgment and thought content normal      Ortho Exam  LEFT KNEE:  Erythema: no  Swelling: + mild  Increased Warmth: no  Tenderness: ++ medial joint line  Flexion: intact  Extension: intact  Patellar Displacement:  Patellar Tilt:  Patellar Apprehension: negative  Patellar Grind Duke's: negative  Lachman's: negative  Drawer: negative  Varus laxity: negative  Valgus laxity: negative  [facility]: + medial pain  Thessaly Test:  Dial Test:       Procedures

## 2018-04-20 NOTE — TELEPHONE ENCOUNTER
Please alert patient that her x-ray report for her right foot shows that she may have a stress fracture  This is a weakening of the bone due to chronic overuse and/or poor walking gait  As such, I am ordering an MRI of her right foot to ensure that she does not have a stress fracture  I will follow up with her after MRI of her right foot  She is to continue to follow up with Dr Nuha Lopez for her knee

## 2018-04-20 NOTE — PATIENT INSTRUCTIONS
Patient follow up with surgeon to evaluation for possible operative intervention  I explained the patient that she has arthritis in her knee as mild-to-moderate but there are patches of full-thickness tear of the cartilage on MR I  Explained that she may not be a candidate for meniscal surgery  Patient agreed to see surgeon for consultation  Educated risks of mixing NSAIDS (also known as non-steroidal anti-inflammatory pills) including advil, ibuprofen, motrin, aleve, naproxen, aspirin, and ibuprofen containing products with each other or with steroids (such as prednisone, medrol)  Explained risks of mixing these medications including stomach ulcer, severe internal bleeding, and kidney failure  Instructed not to take NSAIDS if have history of stomach ulcers, kidney issues, or hypertension  Instructed patient to use only one brand as prescribed  For naproxen, a maximum of 500 mg per dose every 12 hours and no more than two doses or 1,000mg per day  For Ibuprofen, a maximum of 800 mg per dose every 6 hours but no more than 3 doses or 2,400 mg per day  Never take these medications together  Never take these medications the same day  For severe pain and only if you have no liver problems, you may add Tylenol (also known as acetaminophen) maximum of 1,000  Mg per dose every 6 hours but no more 3 doses or 3,000 mg per day  Patient expressed understanding and agreed to plan

## 2018-04-23 NOTE — TELEPHONE ENCOUNTER
Called and left a voicemail on patient's cell phone and asked patient to call us back at earliest convenience

## 2018-04-25 NOTE — TELEPHONE ENCOUNTER
Called and left a voicemail for patient and requested to call us back  This is the third voicemail I left for this patient  I called patient's cell phone: [phone]

## 2018-04-26 NOTE — TELEPHONE ENCOUNTER
Patient called back and was given the information that Dr Mai Hutchinson wanted passed along  Patient expressed she had an appt for an MRI for her knee but not the right foot  She was transferred so she could make appt

## 2018-04-26 NOTE — TELEPHONE ENCOUNTER
Called patient's  and spoke to him asking if he could let patient know to call office  The office number was given to him  Patient's  said he would text her the number

## 2018-05-04 ENCOUNTER — OFFICE VISIT (OUTPATIENT)
Dept: OBGYN CLINIC | Facility: MEDICAL CENTER | Age: 43
End: 2018-05-04
Payer: COMMERCIAL

## 2018-05-04 ENCOUNTER — HOSPITAL ENCOUNTER (OUTPATIENT)
Dept: MRI IMAGING | Facility: HOSPITAL | Age: 43
Discharge: HOME | End: 2018-05-04
Attending: FAMILY MEDICINE
Payer: COMMERCIAL

## 2018-05-04 ENCOUNTER — APPOINTMENT (OUTPATIENT)
Dept: RADIOLOGY | Facility: CLINIC | Age: 43
End: 2018-05-04
Payer: COMMERCIAL

## 2018-05-04 VITALS
WEIGHT: 249 LBS | DIASTOLIC BLOOD PRESSURE: 91 MMHG | SYSTOLIC BLOOD PRESSURE: 136 MMHG | HEART RATE: 82 BPM | BODY MASS INDEX: 39.08 KG/M2 | HEIGHT: 67 IN

## 2018-05-04 DIAGNOSIS — Z01.89 ENCOUNTER FOR LOWER EXTREMITY COMPARISON IMAGING STUDY: ICD-10-CM

## 2018-05-04 DIAGNOSIS — M79.671 PAIN IN RIGHT FOOT: ICD-10-CM

## 2018-05-04 DIAGNOSIS — S83.242D OTHER TEAR OF MEDIAL MENISCUS OF LEFT KNEE AS CURRENT INJURY, SUBSEQUENT ENCOUNTER: ICD-10-CM

## 2018-05-04 DIAGNOSIS — S83.242A ACUTE MEDIAL MENISCUS TEAR, LEFT, INITIAL ENCOUNTER: ICD-10-CM

## 2018-05-04 DIAGNOSIS — S83.242A ACUTE MEDIAL MENISCUS TEAR, LEFT, INITIAL ENCOUNTER: Primary | ICD-10-CM

## 2018-05-04 DIAGNOSIS — M17.12 PRIMARY OSTEOARTHRITIS OF LEFT KNEE: ICD-10-CM

## 2018-05-04 PROCEDURE — 99214 OFFICE O/P EST MOD 30 MIN: CPT | Performed by: PHYSICIAN ASSISTANT

## 2018-05-04 PROCEDURE — 73718 MRI LOWER EXTREMITY W/O DYE: CPT

## 2018-05-04 PROCEDURE — 20610 DRAIN/INJ JOINT/BURSA W/O US: CPT | Performed by: PHYSICIAN ASSISTANT

## 2018-05-04 PROCEDURE — 73562 X-RAY EXAM OF KNEE 3: CPT

## 2018-05-04 PROCEDURE — 73564 X-RAY EXAM KNEE 4 OR MORE: CPT

## 2018-05-04 RX ORDER — LIDOCAINE HYDROCHLORIDE 10 MG/ML
5 INJECTION, SOLUTION INFILTRATION; PERINEURAL
Status: COMPLETED | OUTPATIENT
Start: 2018-05-04 | End: 2018-05-04

## 2018-05-04 RX ORDER — METHYLPREDNISOLONE ACETATE 40 MG/ML
1 INJECTION, SUSPENSION INTRA-ARTICULAR; INTRALESIONAL; INTRAMUSCULAR; SOFT TISSUE
Status: COMPLETED | OUTPATIENT
Start: 2018-05-04 | End: 2018-05-04

## 2018-05-04 RX ORDER — BUPIVACAINE HYDROCHLORIDE 2.5 MG/ML
4 INJECTION, SOLUTION INFILTRATION; PERINEURAL
Status: COMPLETED | OUTPATIENT
Start: 2018-05-04 | End: 2018-05-04

## 2018-05-04 RX ADMIN — LIDOCAINE HYDROCHLORIDE 5 ML: 10 INJECTION, SOLUTION INFILTRATION; PERINEURAL at 13:14

## 2018-05-04 RX ADMIN — BUPIVACAINE HYDROCHLORIDE 4 ML: 2.5 INJECTION, SOLUTION INFILTRATION; PERINEURAL at 13:14

## 2018-05-04 RX ADMIN — METHYLPREDNISOLONE ACETATE 1 ML: 40 INJECTION, SUSPENSION INTRA-ARTICULAR; INTRALESIONAL; INTRAMUSCULAR; SOFT TISSUE at 13:14

## 2018-05-04 NOTE — PROGRESS NOTES
Orthopaedic Surgery - Office Note  Basia Cueto (33 y o  female)   : 1975   MRN: [id number]  Encounter Date: 2018    Chief Complaint   Patient presents with    Left Knee - Pain       Assessment / Plan  Left knee osteoarthritis with medial meniscus tear    · Due to extent of the patient's arthritis we do feel at this time that it would be beneficial to try conservative treatment options prior to considering any surgical   We did discuss conservative treatment options including steroid injection, therapy, bracing, icing and anti-inflammatories  · At this time the patient agreed to proceed with the aspiration injection of left knee  This was prepared and injected sterilely and tolerated well  · Continue with ice and analgesics as needed  · Continue with hinged knee brace as needed  If she continues to be unable to tolerate this we will consider a medial  brace  · Patient was instructed to continue with home exercises at this time and activity as tolerated  Return in about 4 weeks (around 2018)  History of Present Illness  Basia Cueto is a 38 y o  female who presents for evaluation of left knee pain which has been present for about 6 weeks  The patient initially experience pain without any specific injury or accident  She felt that the knee started with swelling and medial joint line pain after standing at a long day at work  She had some it issues with the knee prior with soreness and pain but nothing like which she was experiencing now  She did have an MRI done which showed medial meniscus tear but also tricompartmental osteoarthritic changes on MRI  She did receive hinged knee brace which she feels is too uncomfortable to wear  She has been taking Advil for pain but feels this helps a little  Review of Systems  Pertinent items are noted in HPI  All other systems were reviewed and are negative      Physical Exam  /91   Pulse 82   Ht 5' 7" (1 702 m)   Wt 113 kg (249 lb)   LMP 04/11/2018   BMI 39 00 kg/m²   Cons: Appears well  No apparent distress  Psych: Alert  Oriented x3  Mood and affect normal   Eyes: PERRLA, EOMI  Resp: Normal effort  No audible wheezing or stridor  CV: Palpable pulse  No discernable arrhythmia  No LE edema  Lymph:  No palpable cervical, axillary, or inguinal lymphadenopathy  Skin: Warm  No palpable masses  No visible lesions  Neuro: Normal muscle tone  Normal and symmetric DTR's  Left Knee Exam  Alignment:  Normal knee alignment  Inspection:  Moderate swelling  No erythema  No ecchymosis  No deformity  Palpation:  Mild to moderate tenderness at The medial joint line and superiorly around the patella  ROM:  Knee Extension 0°  Knee Flexion 100° limited by inflammation  Strength:  Not tested  Stability:  No objective knee instability  Stable Varus / Valgus stress, Lachman, and Posterior drawer  Tests:  (+) Chavez  Patella:  Patella tracks centrally with crepitus  Neurovascular:  Sensation intact in DP/SP/George/Sa/T nerve distributions  Sensation intact in all digital nerve distributions  Toes warm and perfused  Gait:  Antalgic  Studies Reviewed  XR of left knee - Shows moderate medial joint line narrowing with subchondral sclerosis and spurring in the medial and patellofemoral compartments  There is also mild to moderate narrowing and patellofemoral joint  MRI of left knee - Shows complex tear through the posterior root medial meniscus exhibiting moderate extrusion without foot fragment  There is tricompartmental degenerative change including full-thickness medial and patellofemoral compartment cartilage loss    Associated mild to moderate joint effusion exhibiting reactive synovitis    Large joint arthrocentesis  Date/Time: 5/4/2018 1:14 PM  Consent given by: patient  Site marked: site marked  Supporting Documentation  Indications: pain and joint swelling   Procedure Details  Location: knee - L knee  Needle size: 18 G  Ultrasound guidance: no  Approach: lateral  Medications administered: 4 mL bupivacaine 0 25 %; 5 mL lidocaine 1 %; 1 mL methylPREDNISolone acetate 40 mg/mL    Aspirate amount: 20 mL  Aspirate: clear, serous, yellow and blood-tinged  Patient tolerance: patient tolerated the procedure well with no immediate complications  Dressing:  Sterile dressing applied           Medical, Surgical, Family, and Social History  The patient's medical history, family history, and social history, were reviewed and updated as appropriate  History reviewed  No pertinent past medical history  Past Surgical History:   Procedure Laterality Date    ABDOMINAL SURGERY       SECTION      CHOLECYSTECTOMY         Family History   Problem Relation Age of Onset    Cancer Mother     Diabetes Father     Heart disease Father     Cancer Father        Social History     Occupational History    Not on file       Social History Main Topics    Smoking status: Current Every Day Smoker     Packs/day: 0 50     Types: Cigarettes    Smokeless tobacco: Never Used    Alcohol use No    Drug use: No    Sexual activity: Not on file       No Known Allergies      Current Outpatient Prescriptions:     acetaminophen (TYLENOL) 325 mg tablet, Take 650 mg by mouth every 6 (six) hours as needed for mild pain, Disp: , Rfl:     famotidine (PEPCID) 20 mg tablet, Take 1 tablet (20 mg total) by mouth 2 (two) times a day, Disp: 30 tablet, Rfl: 0    ibuprofen (MOTRIN) 800 mg tablet, Take 1 tablet (800 mg total) by mouth every 8 (eight) hours as needed for mild pain, Disp: 40 tablet, Rfl: 1    lidocaine (LIDODERM) 5 %, Place 1 patch on the skin daily Remove & Discard patch within 12 hours or as directed by MD, Disp: 15 patch, Rfl: 0    oxyCODONE-acetaminophen (PERCOCET) 5-325 mg per tablet, Take 1 tablet by mouth every 6 (six) hours as needed for moderate pain Max Daily Amount: 4 tablets, Disp: 15 tablet, Rfl: 0    meloxicam (MOBIC) 15 mg tablet, Take 1 tablet (15 mg total) by mouth daily for 15 days, Disp: 15 tablet, Rfl: 0      Rishabh Nicole PA-C    Scribe Attestation    I,:    am acting as a scribe while in the presence of the attending physician :        I,:    personally performed the services described in this documentation    as scribed in my presence :

## 2018-05-07 ENCOUNTER — TELEPHONE (OUTPATIENT)
Dept: OBGYN CLINIC | Facility: MEDICAL CENTER | Age: 43
End: 2018-05-07

## 2018-05-07 NOTE — TELEPHONE ENCOUNTER
Called patient two times and left a voicemail the second time on her mobile phone telling her she needs to make a follow up appt  With Dr Darwin Mena  I told her to call the office as soon as she can

## 2018-05-07 NOTE — TELEPHONE ENCOUNTER
Please inform patient that she requires follow-up appointment with me for her stress fracture that was found on MRI   She will need a cam boot at least

## 2018-05-08 NOTE — TELEPHONE ENCOUNTER
Called and left a voicemail informing patient to make a follow-up appt  Gave her the number for scheduling

## 2018-05-09 NOTE — TELEPHONE ENCOUNTER
Called and left voicemail for patient asking her to give us a call at the office so she can schedule an appt  Also gave her the number for scheduling

## 2018-05-10 NOTE — TELEPHONE ENCOUNTER
Called patient's  and spoke to him asking if he can let patient know to call us so we can rely information to her since several attempts have been made to contact her  Asked him to tell her to call office so she can schedule a follow up appt  With Dr Irene Cordon   confirmed he would let her know  Also asked if he had office number and he confirmed he did

## 2018-05-11 NOTE — TELEPHONE ENCOUNTER
Called patient and left a voicemail asking her to call office to make a follow up appt with Dr Nely Mckeon  Gave her the number for the office and the number for scheduling

## 2018-05-15 NOTE — TELEPHONE ENCOUNTER
Since I cannot get a hold of this patient, can you please write the letter you had said you would when you get the chance? Thanks!

## 2018-06-21 ENCOUNTER — TELEPHONE (OUTPATIENT)
Dept: OBGYN CLINIC | Facility: CLINIC | Age: 43
End: 2018-06-21

## 2018-06-21 ENCOUNTER — APPOINTMENT (OUTPATIENT)
Dept: RADIOLOGY | Facility: CLINIC | Age: 43
End: 2018-06-21
Payer: COMMERCIAL

## 2018-06-21 ENCOUNTER — OFFICE VISIT (OUTPATIENT)
Dept: OBGYN CLINIC | Facility: MEDICAL CENTER | Age: 43
End: 2018-06-21
Payer: COMMERCIAL

## 2018-06-21 VITALS
DIASTOLIC BLOOD PRESSURE: 86 MMHG | SYSTOLIC BLOOD PRESSURE: 131 MMHG | HEART RATE: 89 BPM | WEIGHT: 250 LBS | HEIGHT: 67 IN | BODY MASS INDEX: 39.24 KG/M2

## 2018-06-21 DIAGNOSIS — M79.671 PAIN IN RIGHT FOOT: ICD-10-CM

## 2018-06-21 DIAGNOSIS — M79.671 PAIN IN RIGHT FOOT: Primary | ICD-10-CM

## 2018-06-21 DIAGNOSIS — M84.374A STRESS FRACTURE OF METATARSAL BONE OF RIGHT FOOT, INITIAL ENCOUNTER: ICD-10-CM

## 2018-06-21 DIAGNOSIS — M21.42 PES PLANUS OF BOTH FEET: ICD-10-CM

## 2018-06-21 DIAGNOSIS — M21.41 PES PLANUS OF BOTH FEET: ICD-10-CM

## 2018-06-21 PROCEDURE — 73630 X-RAY EXAM OF FOOT: CPT

## 2018-06-21 PROCEDURE — 28470 CLTX METATARSAL FX WO MNP EA: CPT | Performed by: FAMILY MEDICINE

## 2018-06-21 PROCEDURE — 99214 OFFICE O/P EST MOD 30 MIN: CPT | Performed by: FAMILY MEDICINE

## 2018-06-21 NOTE — LETTER
June 21, 2018     Patient: Anastasiya Quinn   YOB: 1975   Date of Visit: 6/21/2018       To Whom it May Concern:    Anastasiya Quinn is under my professional care  She was seen in my office on 6/21/2018  She may return to work on  06/24/2018  I recommend light duty work restrictions to include wearing her Cam walker boot in work at all times for right lower extremity stress fracture  I recommend against any job that requires extensive walking including picking  If you have any questions or concerns, please don't hesitate to call           Sincerely,          Flavia Becker III, DO        CC: Anastasiya Quinn

## 2018-06-21 NOTE — TELEPHONE ENCOUNTER
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

## 2018-06-21 NOTE — TELEPHONE ENCOUNTER
I will have MA call to instructed patient to use knee Rollator scooter and to attempt nonweightbearing as much as possible to allow for healing of her stress fracture  I have placed order for Rollator  Please inform patient had acquire per in the interim she may use crutches  I want her to remove pressure from her bone as much as possible  If she does not improved after 6 weeks then she may require surgery

## 2018-06-21 NOTE — PROGRESS NOTES
1  Pain in right foot  XR foot 3+ vw right    Misc  Devices (ROLLATOR) MISC   2  Stress fracture of metatarsal bone of right foot, initial encounter  Misc  Devices (ROLLATOR) MISC   3  Pes planus of both feet       Orders Placed This Encounter   Procedures    XR foot 3+ vw right        Imaging Studies (I personally reviewed results in PACS):  MRI right foot 05/04/2018:  Edema 4th metatarsal  base  Midfoot arthritis  X-ray right foot 06/11/2018:  Stable alignment of metatarsal   There is thickening of 4th metatarsal cortex consistent with stress fracture  IMPRESSION:  Right 4th metatarsal stress fracture  Bilateral flexible Pes planus      Return in about 6 weeks (around 8/2/2018)  Patient Instructions   Explained the patient that she has a stress fracture in her foot  I explained that this is a break a bone due to accumulation of stress  Explained that risk factors to include flat feet  I recommended wearing flatfoot inserts in her Cam boot to provide arch support and prevent undue pressure on her fracture  I also recommend wearing Cam boot  I explained that typical trial of non operative treatment is 4-8 weeks  I explained that sometimes stress fractures do not heal and require surgery  I recommended daily range-of-motion exercises at least 4 times per day  Patient expressed understanding with the plan  I will have MA call to instructed patient to use knee Rollator and to attempt nonweightbearing as much as possible to allow for healing of her stress fracture  CHIEF COMPLAINT:  Complains of right stress fracture    HPI:  Jacob Gabriel is a 38 y o  female  who presents for       Visit 06/21/2018:  Patient here for follow-up right foot pain  She has had foot pain for 2+ years  She did have an x-ray performed in 04/17/2018 which did show thickening of cortex concerning for stress fracture    Subsequently MRI was ordered as an outpatient and revealed stress fracture at 4th metatarsal base     Patient tells me that she has had pain in her right foot for 2+ years status post injury that occurred at work when object fell on her foot  She states that she has been unable to walk correctly on her right foot since then  She states he has been limping on her right foot ever since object fell on to her foot 2 years ago  Describes the pain is intermittent sharp constant throbbing in the day worse with walking  Review of Systems   Constitutional: Negative for chills, fever and unexpected weight change  HENT: Negative for hearing loss, nosebleeds and sore throat  Eyes: Negative for pain, redness and visual disturbance  Respiratory: Negative for cough, shortness of breath and wheezing  Cardiovascular: Negative for chest pain, palpitations and leg swelling  Gastrointestinal: Negative for abdominal distention, nausea and vomiting  Endocrine: Negative for polydipsia and polyuria  Genitourinary: Negative for dysuria and hematuria  Skin: Negative for rash and wound  Neurological: Negative for dizziness, numbness and headaches  Psychiatric/Behavioral: Negative for decreased concentration and suicidal ideas  Following history reviewed and update:    History reviewed  No pertinent past medical history    Past Surgical History:   Procedure Laterality Date    ABDOMINAL SURGERY       SECTION      CHOLECYSTECTOMY       Social History   History   Alcohol Use No     History   Drug Use No     History   Smoking Status    Current Every Day Smoker    Packs/day: 0 50    Types: Cigarettes   Smokeless Tobacco    Never Used     Family History   Problem Relation Age of Onset    Cancer Mother     Diabetes Father     Heart disease Father     Cancer Father      No Known Allergies       Physical Exam  Constitutional:  see vital signs  Gen: well-developed, normocephalic/atraumatic, well-groomed  Eyes: No inflammation or discharge of conjunctiva or lids; sclera clear   Pharynx: no inflammation, lesion, or mass of lips  Neck: supple, no masses, non-distended  MSK: no inflammation, lesion, mass, or clubbing of nails and digits except for other than mentioned below  SKIN: no visible rashes or skin lesions  Pulmonary/Chest: Effort normal  No respiratory distress     NEURO: cranial nerves grossly intact  PSYCH:  Alert and oriented to person, place, and time; recent and remote memory intact; mood normal, no depression, anxiety, or agitation, judgment and insight good and intact     Ortho Exam  RIGHT ANKLE & FOOT EXAM  Observation  GAIT:  Abnormal right antalgic gait; increased comfort in Cam walker boot without pain right foot  Bilateral flexible pes planus    Inspection  Erythema: no  Ecchymosis: no  Edema:  none      Tenderness  Proximal Fibula: no  (Maisonneuve frx)  AiTFL: no  (2cm proximal-medial to tip lateral malleolus 92% sens, 29% spec)  ATFL: no  CFL: no  PTFL: no  Achilles:  no  deltoid: No  Peroneal: no  Tibialis Anterior: no  Tibialis Posterior: no  Metatarsals:+ tenderness 4th metatarsal shaft and base  Positive metatarsal squeeze test with pain 4th metatarsal    Bony Tenderpoints:  Lateral Malleolus: no  Base of 5th MT: no  Medial Malleolus: no  Navicular: no  Talar dome: No    ROM  Dorsiflexion: intact  Plantarflexion: intact    Muscle Strength  Pronation: intact without pain  Supination: intact without pain    Tib-Fib Squeeze: negative  (pvytbiyhydej-je-xrurxqxzhdgvlj squeeze; 26% sens, 88% spec; rule in test)    Calcaneal Squeeze: negative    Dorsiflexion (+) ER Stress Test: negative  (reproduce ATiFL mech; 71% sens, 63% spec)        Procedures

## 2018-06-21 NOTE — PATIENT INSTRUCTIONS
Explained the patient that she has a stress fracture in her foot  I explained that this is a break a bone due to accumulation of stress  Explained that risk factors to include flat feet  I recommended wearing flatfoot inserts in her Cam boot to provide arch support and prevent undue pressure on her fracture  I also recommend wearing Cam boot  I explained that typical trial of non operative treatment is 4-8 weeks  I explained that sometimes stress fractures do not heal and require surgery  I recommended daily range-of-motion exercises at least 4 times per day  Patient expressed understanding with the plan

## 2018-06-25 NOTE — TELEPHONE ENCOUNTER
Called patient's husbands phone but number was not in service  Attempted to call patient's cell phone and was able to get a hold of her  Physician then took call and explained the below information

## 2018-06-25 NOTE — TELEPHONE ENCOUNTER
Called patient and was unable to leave a vmail due to patient not having her vmail setup  Will attempt later

## 2018-07-06 ENCOUNTER — TELEPHONE (OUTPATIENT)
Dept: OBGYN CLINIC | Facility: HOSPITAL | Age: 43
End: 2018-07-06

## 2018-07-06 NOTE — TELEPHONE ENCOUNTER
barry is calling on behalf of the patient concerning her short term disability  barry says she needs to speak to the physician or nurse so that they can approve the patients claim      Rafa pt

## 2018-07-13 ENCOUNTER — TELEPHONE (OUTPATIENT)
Dept: OBGYN CLINIC | Facility: HOSPITAL | Age: 43
End: 2018-07-13

## 2018-07-13 NOTE — TELEPHONE ENCOUNTER
was unavailable to discuss patient's claim approval  Was told that they would call back if they needed to complete anything

## 2018-07-13 NOTE — TELEPHONE ENCOUNTER
Patient called yesterday to track down disability/fmla paperwork --> I looked for the paper work and we have no copy or record of ever receiving it  She said it was sent in over 3 weeks ago and her employer faxed it to us but not sure which fax number  I tried calling Margoth Pizano on thursday 7/12/18, left her a VM asking for a call back because of not having anything to complete for her FMLA/Disability  I called again today, 7/13/18 and spoke with Margoth Pizano and explained the situation  I provided her with my direct fax , [phone] for her to re-submit the paper work right away  She said she is going to maybe call her employer and ask for them to re-fax it or she may wait until 2pm today and see if she got her own personal copy in the mail, in which she will then fax over to us herself  * if no paperwork is received by Monday, I told her I would reach out to her again because she said since it was not submitted yesterday, 7/12/18 she is not getting paid for the next 2 weeks  * I apologized to patient and informed her that I would attempt to contact her employer directly about submitting paperwork a day late, but she said it would not make a difference  * I will mail patient a copy of the completed forms once they are completed

## 2018-07-16 ENCOUNTER — TELEPHONE (OUTPATIENT)
Dept: OBGYN CLINIC | Facility: HOSPITAL | Age: 43
End: 2018-07-16

## 2018-07-16 NOTE — TELEPHONE ENCOUNTER
CALLED PATIENT 7/16/18 BECAUSE I WANTED TO CONFIRM THAT SHE NEVER RETURNED TO WORK ONCE DR MEDINA PUT HER OUT ON 6/21/18  **THERE ARE MULTIPLE WORK NOTES FROM SAME OFFICE VISIT, LAST/MOST RECENT NOTE STATES NO WORK UNTIL CLEARED INSTEAD OF BEING ON RESTRICTED/LIGHT DUTY**     I CALLED TWICE ON Monday, BUT CAN'T LEAVE A MESSAGEL BECAUSE NO VOICEMAIL IS SET UP  NO EMAIL ON FILE EITHER  I AM SUBMITTING HER FMLA FORMS AS I SEE FIT, STATING 6/21/18 NO WORK UNTIL CLEARED (DISREGARDING CAROL'S 2 OTHER LETTER WITH RESTRICTIONS)  I FAXED COMPLETED FORMS + MEDICAL RECORDS TO THE [employer] ON 7/16/18 & MAILED PATIENT A COMPLETED COPY

## 2018-07-16 NOTE — TELEPHONE ENCOUNTER
Submitted fmla/disability form today via fx  Included all office notes, imaging reports & work letter

## 2018-07-27 ENCOUNTER — OFFICE VISIT (OUTPATIENT)
Dept: OBGYN CLINIC | Facility: MEDICAL CENTER | Age: 43
End: 2018-07-27
Payer: COMMERCIAL

## 2018-07-27 VITALS — DIASTOLIC BLOOD PRESSURE: 97 MMHG | SYSTOLIC BLOOD PRESSURE: 144 MMHG | HEART RATE: 89 BPM

## 2018-07-27 DIAGNOSIS — M17.12 PRIMARY OSTEOARTHRITIS OF LEFT KNEE: Primary | ICD-10-CM

## 2018-07-27 PROCEDURE — 99214 OFFICE O/P EST MOD 30 MIN: CPT | Performed by: ORTHOPAEDIC SURGERY

## 2018-07-27 PROCEDURE — 20610 DRAIN/INJ JOINT/BURSA W/O US: CPT | Performed by: ORTHOPAEDIC SURGERY

## 2018-07-27 RX ORDER — METHYLPREDNISOLONE ACETATE 40 MG/ML
1 INJECTION, SUSPENSION INTRA-ARTICULAR; INTRALESIONAL; INTRAMUSCULAR; SOFT TISSUE
Status: COMPLETED | OUTPATIENT
Start: 2018-07-27 | End: 2018-07-27

## 2018-07-27 RX ORDER — BUPIVACAINE HYDROCHLORIDE 2.5 MG/ML
4 INJECTION, SOLUTION INFILTRATION; PERINEURAL
Status: COMPLETED | OUTPATIENT
Start: 2018-07-27 | End: 2018-07-27

## 2018-07-27 RX ADMIN — METHYLPREDNISOLONE ACETATE 1 ML: 40 INJECTION, SUSPENSION INTRA-ARTICULAR; INTRALESIONAL; INTRAMUSCULAR; SOFT TISSUE at 11:06

## 2018-07-27 RX ADMIN — BUPIVACAINE HYDROCHLORIDE 4 ML: 2.5 INJECTION, SOLUTION INFILTRATION; PERINEURAL at 11:06

## 2018-07-27 NOTE — PROGRESS NOTES
Orthopaedic Surgery - Office Note  Georges White (32 y o  female)   : 1975   MRN: [id number]  Encounter Date: 2018    Chief Complaint   Patient presents with    Left Knee - Follow-up       Assessment / Plan  Left knee osteoarthritis with medial meniscus tear    · Injection of corticosteroid into the left knee was advised accepted administered as outlined below  · Patient was counseled on weight management and a referral was placed  · Home exercise program for quadriceps strengthening was reviewed with the patient  · She has complained see Dr perkins after the for her 4th metatarsal base fracture soon  · Follow-up in 8 weeks for repeat examination      History of Present Illness  Georges White is a 38 y o  female with left knee pain presenting for follow-up evaluation  She has previously been diagnosed with osteoarthritis and a posterior medial meniscus tear  At her last visit she was given a steroid injection which gave her approximately 5 weeks of relief  However she has had recurrence of her symptoms which includes medially based knee pain is made worse with weight-bearing activities relieved by rest   She has occasional buckling of her knee and catching  She is currently being treated for a 4th metatarsal base fracture with a Cam boot on her contralateral lower extremity      Review of Systems  Pertinent items are noted in HPI  All other systems were reviewed and are negative  Physical Exam  /97   Pulse 89   Cons: Appears well  No apparent distress  Psych: Alert  Oriented x3  Mood and affect normal   Eyes: PERRLA, EOMI  Resp: Normal effort  No audible wheezing or stridor  CV: Palpable pulse  No discernable arrhythmia  No LE edema  Lymph:  No palpable cervical, axillary, or inguinal lymphadenopathy  Skin: Warm  No palpable masses  No visible lesions  Neuro: Normal muscle tone  Normal and symmetric DTR's  Left Knee Exam  Alignment:  Normal knee alignment    Inspection: Moderate swelling  No erythema  No ecchymosis  No deformity  Palpation:  Point tenderness at medial joint line  ROM:  Knee Extension 0°  Knee Flexion 110  Strength:  Not tested  5/5 quadriceps and hamstrings  Stability:  No objective knee instability  Stable Varus / Valgus stress, Lachman, and Posterior drawer  Tests:  (+) Chavez  Patella:  Patella tracks centrally with crepitus  Neurovascular:  Sensation intact in DP/SP/George/Sa/T nerve distributions  Toes warm and perfused  Gait:  Antalgic  Studies Reviewed  XR of left knee - Shows moderate medial joint line narrowing with subchondral sclerosis and spurring in the medial and patellofemoral compartments  There is also mild to moderate narrowing and patellofemoral joint  MRI of left knee - Shows complex tear through the posterior root medial meniscus exhibiting moderate extrusion without foot fragment  There is tricompartmental degenerative change including full-thickness medial and patellofemoral compartment cartilage loss    Associated mild to moderate joint effusion exhibiting reactive synovitis    Large joint arthrocentesis  Date/Time: 7/27/2018 11:06 AM  Consent given by: patient  Site marked: site marked  Timeout: Immediately prior to procedure a time out was called to verify the correct patient, procedure, equipment, support staff and site/side marked as required   Supporting Documentation  Indications: pain   Procedure Details  Location: knee - L knee  Preparation: Patient was prepped and draped in the usual sterile fashion  Needle size: 22 G  Ultrasound guidance: no  Approach: superolateral   Medications administered: 4 mL bupivacaine 0 25 %; 1 mL methylPREDNISolone acetate 40 mg/mL    Patient tolerance: patient tolerated the procedure well with no immediate complications  Dressing:  Sterile dressing applied           Medical, Surgical, Family, and Social History  The patient's medical history, family history, and social history, were reviewed and updated as appropriate  History reviewed  No pertinent past medical history  Past Surgical History:   Procedure Laterality Date    ABDOMINAL SURGERY       SECTION      CHOLECYSTECTOMY         Family History   Problem Relation Age of Onset    Cancer Mother     Diabetes Father     Heart disease Father     Cancer Father        Social History     Occupational History    Not on file  Social History Main Topics    Smoking status: Current Every Day Smoker     Packs/day: 0 50     Types: Cigarettes    Smokeless tobacco: Never Used    Alcohol use No    Drug use: No    Sexual activity: Not on file       No Known Allergies      Current Outpatient Prescriptions:     acetaminophen (TYLENOL) 325 mg tablet, Take 650 mg by mouth every 6 (six) hours as needed for mild pain, Disp: , Rfl:     famotidine (PEPCID) 20 mg tablet, Take 1 tablet (20 mg total) by mouth 2 (two) times a day, Disp: 30 tablet, Rfl: 0    ibuprofen (MOTRIN) 800 mg tablet, Take 1 tablet (800 mg total) by mouth every 8 (eight) hours as needed for mild pain, Disp: 40 tablet, Rfl: 1    lidocaine (LIDODERM) 5 %, Place 1 patch on the skin daily Remove & Discard patch within 12 hours or as directed by MD, Disp: 15 patch, Rfl: 0    Misc   Devices (ROLLATOR) MISC, by Does not apply route continuous, Disp: 1 each, Rfl: 0    oxyCODONE-acetaminophen (PERCOCET) 5-325 mg per tablet, Take 1 tablet by mouth every 6 (six) hours as needed for moderate pain Max Daily Amount: 4 tablets, Disp: 15 tablet, Rfl: 0    meloxicam (MOBIC) 15 mg tablet, Take 1 tablet (15 mg total) by mouth daily for 15 days, Disp: 15 tablet, Rfl: 0      Graham Ling MD    Scribe Attestation    I,:    am acting as a scribe while in the presence of the attending physician :        I,:    personally performed the services described in this documentation    as scribed in my presence :

## 2018-08-02 ENCOUNTER — OFFICE VISIT (OUTPATIENT)
Dept: OBGYN CLINIC | Facility: MEDICAL CENTER | Age: 43
End: 2018-08-02

## 2018-08-02 ENCOUNTER — TELEPHONE (OUTPATIENT)
Dept: BARIATRICS | Facility: CLINIC | Age: 43
End: 2018-08-02

## 2018-08-02 ENCOUNTER — APPOINTMENT (OUTPATIENT)
Dept: RADIOLOGY | Facility: CLINIC | Age: 43
End: 2018-08-02
Payer: COMMERCIAL

## 2018-08-02 VITALS
HEART RATE: 97 BPM | WEIGHT: 250 LBS | BODY MASS INDEX: 39.24 KG/M2 | HEIGHT: 67 IN | DIASTOLIC BLOOD PRESSURE: 97 MMHG | SYSTOLIC BLOOD PRESSURE: 141 MMHG

## 2018-08-02 DIAGNOSIS — M84.374D STRESS FRACTURE OF METATARSAL BONE OF RIGHT FOOT WITH ROUTINE HEALING, SUBSEQUENT ENCOUNTER: Primary | ICD-10-CM

## 2018-08-02 DIAGNOSIS — M84.374D STRESS FRACTURE OF METATARSAL BONE OF RIGHT FOOT WITH ROUTINE HEALING, SUBSEQUENT ENCOUNTER: ICD-10-CM

## 2018-08-02 PROCEDURE — 73630 X-RAY EXAM OF FOOT: CPT

## 2018-08-02 PROCEDURE — 99024 POSTOP FOLLOW-UP VISIT: CPT | Performed by: FAMILY MEDICINE

## 2018-08-02 RX ORDER — NICOTINE 21 MG/24H
1 PATCH, EXTENDED RELEASE TRANSDERMAL DAILY
Refills: 0 | COMMUNITY
Start: 2018-07-29 | End: 2018-11-20

## 2018-08-02 RX ORDER — NICOTINE POLACRILEX 4 MG
GUM BUCCAL
Refills: 3 | COMMUNITY
Start: 2018-07-29 | End: 2018-11-20

## 2018-08-02 NOTE — PROGRESS NOTES
1  Stress fracture of metatarsal bone of right foot with routine healing, subsequent encounter  XR foot 3+ vw right    Ambulatory referral to [facility]  This Encounter   Procedures    XR foot 3+ vw right    Ambulatory referral to Podiatry        Imaging Studies (I personally reviewed results in PACS):  X-ray right foot 08/02/2018:  Stable alignment  No visible fracture line 4th metatarsal   There is thickening of cortex 4th metatarsal   There is no definitive evidence of healing    Past diagnostics:  MRI right foot 05/04/2018:  Edema 4th metatarsal  base  Midfoot arthritis  X-ray right foot 06/11/2018:  Stable alignment of metatarsal   There is thickening of 4th metatarsal cortex consistent with stress fracture  IMPRESSION:  Right 4th metatarsal stress fracture  Bilateral flexible Pes planus   Date of nonweightbearing initiation:  06/21/2018  Follow-up interval: 6 weeks    Return if symptoms worsen or fail to improve  Patient Instructions   I explained the patient today that she still has evidence of pain on her 4th metatarsal bone  Explained that this is evidence that she may not have fully healed her stress fracture  She will continue her Cam walker boot in follow-up with podiatry to determine need for surgical fixation of stress fracture with chronic pain for 2 years in her right foot  CHIEF COMPLAINT:  Complains of right stress fracture    HPI:  Angelica Rouse is a 38 y o  female  who presents for       Visit 06/21/2018:  Patient here for follow-up right foot pain  She has had foot pain for 2+ years  She did have an x-ray performed in 04/17/2018 which did show thickening of cortex concerning for stress fracture  Subsequently MRI was ordered as an outpatient and revealed stress fracture at 4th metatarsal base  Patient tells me that she has had pain in her right foot for 2+ years status post injury that occurred at work when object fell on her foot    She states that she has been unable to walk correctly on her right foot since then  She states he has been limping on her right foot ever since object fell on to her foot 2 years ago  Describes the pain is intermittent sharp constant throbbing in the day worse with walking  Visit 2018: Follow-up right foot pain and 4th metatarsal stress fracture  Patient made nonweightbearing in Cam walker boot last visit  She has been mostly compliant with nonweightbearing has only walked on her boot on occasion  She did walk in the office today with her boot  She has been out of work for last 6 weeks on leave  She continues to have mild intermittent pain right foot when walking  Review of Systems   Constitutional: Negative for chills and fever  Neurological: Negative for weakness and numbness  Following history reviewed and update:    History reviewed  No pertinent past medical history  Past Surgical History:   Procedure Laterality Date    ABDOMINAL SURGERY       SECTION      CHOLECYSTECTOMY       Social History   History   Alcohol Use No     History   Drug Use No     History   Smoking Status    Current Every Day Smoker    Packs/day: 0 50    Types: Cigarettes   Smokeless Tobacco    Never Used     Family History   Problem Relation Age of Onset    Cancer Mother     Diabetes Father     Heart disease Father     Cancer Father      No Known Allergies       Physical Exam  Constitutional:  see vital signs  Gen: well-developed, normocephalic/atraumatic, well-groomed  Eyes: No inflammation or discharge of conjunctiva or lids; sclera clear   Pharynx: no inflammation, lesion, or mass of lips  Neck: supple, no masses, non-distended  MSK: no inflammation, lesion, mass, or clubbing of nails and digits except for other than mentioned below  SKIN: no visible rashes or skin lesions  Pulmonary/Chest: Effort normal  No respiratory distress     NEURO: cranial nerves grossly intact  PSYCH:  Alert and oriented to person, place, and time; recent and remote memory intact; mood normal, no depression, anxiety, or agitation, judgment and insight good and intact      Ortho Exam    RIGHT ANKLE & FOOT EXAM  Observation  GAIT:  Normal gait but with complaint of pain right foot walking in room without boot    Inspection  Erythema: no  Ecchymosis: no  Edema:  none    Tenderness  Proximal Fibula: no  (Maisonneuve frx)  AiTFL: no  (2cm proximal-medial to tip lateral malleolus 92% sens, 29% spec)  ATFL: no  CFL: no  PTFL: no  Achilles:  no  deltoid: No  Peroneal: no  Tibialis Anterior: no  Tibialis Posterior: no    Bony Tenderpoints:  Lateral Malleolus: no  Base of 5th MT: no  Medial Malleolus: no  Navicular: no  Talar dome: No    ROM  Dorsiflexion: intact  Plantarflexion: intact    Muscle Strength  Pronation: intact without pain  Supination: intact without pain    Tib-Fib Squeeze: negative  (eeyahvfgquls-rp-lgcvceusskqiyd squeeze; 26% sens, 88% spec; rule in test)    Calcaneal Squeeze: negative    Dorsiflexion (+) ER Stress Test: negative  (reproduce ATiFL mech; 71% sens, 63% spec)        RIGHT CALF EXAM:  No swelling erythema or increased warmth  No palpable cords  Tenderness: none  Negative Ashish sign    RIGHT FOOT EXAM:  No swelling, erythema or increased warmth  Tenderness: + 4th metatarsal dorsal  ROM Toes extension: intact  ROM Toes flexion: intact  Strength Toes: 5/5 flex, ext  Sensation intact  Capillary refill intact  Metatarsal squeeze negative           Procedures

## 2018-08-02 NOTE — LETTER
August 2, 2018     Patient: Irvin Mcclellan   YOB: 1975   Date of Visit: 8/2/2018       To Whom it May Concern:    Irvin Mcclellan is under my professional care  She was seen in my office on 8/2/2018  If you have any questions or concerns, please don't hesitate to call           Sincerely,          David Brody III, DO        CC: Irvin Mcclellan

## 2018-08-02 NOTE — PATIENT INSTRUCTIONS
I explained the patient today that she still has evidence of pain on her 4th metatarsal bone  Explained that this is evidence that she may not have fully healed her stress fracture  She will continue her Cam walker boot in follow-up with podiatry to determine need for surgical fixation of stress fracture with chronic pain for 2 years in her right foot

## 2018-08-06 ENCOUNTER — TELEPHONE (OUTPATIENT)
Dept: OBGYN CLINIC | Facility: HOSPITAL | Age: 43
End: 2018-08-06

## 2018-08-06 NOTE — TELEPHONE ENCOUNTER
Thomas- dr Hiram Marquez patient  Ph- [phone]  Patient is on FMLA leave until 8/16 by dr Rayford Aase  Patient reports the podiatrist who she was referred to isnt able to see her until 8/22  She now either needs a new podiatrist who can see her sooner before the 16th or an extended leave, please advise

## 2018-08-08 NOTE — TELEPHONE ENCOUNTER
Patient called back wanted to know if Dr Marivel Blue would extend her FMLA until she see podiatrics on 08/22 at 2p with Dr Colton Dorado  If not who will Dr Marivel Blue recommend for podiatrics other than the one that was given please advise thanks # [phone]

## 2018-09-04 ENCOUNTER — EVALUATION (OUTPATIENT)
Dept: PHYSICAL THERAPY | Facility: OTHER | Age: 43
End: 2018-09-04
Payer: COMMERCIAL

## 2018-09-04 ENCOUNTER — TRANSCRIBE ORDERS (OUTPATIENT)
Dept: PHYSICAL THERAPY | Facility: OTHER | Age: 43
End: 2018-09-04

## 2018-09-04 DIAGNOSIS — M79.672 LEFT FOOT PAIN: Primary | ICD-10-CM

## 2018-09-04 DIAGNOSIS — M79.672 FOOT PAIN, LEFT: Primary | ICD-10-CM

## 2018-09-04 PROCEDURE — G8991 OTHER PT/OT GOAL STATUS: HCPCS | Performed by: PHYSICAL THERAPIST

## 2018-09-04 PROCEDURE — 97161 PT EVAL LOW COMPLEX 20 MIN: CPT | Performed by: PHYSICAL THERAPIST

## 2018-09-04 PROCEDURE — G8990 OTHER PT/OT CURRENT STATUS: HCPCS | Performed by: PHYSICAL THERAPIST

## 2018-09-04 NOTE — LETTER
2018    Gemini Rasmussen, [address] [place] [address]    Patient: Roshan Baldwin   YOB: 1975   Date of Visit: 2018     Encounter Diagnosis     ICD-10-CM    1  Foot pain, left M79 672        Dear Dr Nilton Yeboah:    Please review the attached Plan of Care from [facility] recent visit  Please verify that you agree therapy should continue by signing the attached document and sending it back to our office  If you have any questions or concerns, please don't hesitate to call  Sincerely,    Christopher Figueroa, PT      Referring Provider:      I certify that I have read the below Plan of Care and certify the need for these services furnished under this plan of treatment while under my care  Gemini Rasmussen DPM  [address] [place] [address]: [phone]          PT Evaluation     Today's date: 2018  Patient name: Roshan Baldwin  : 1975  MRN: [id number]  Referring provider: Sravanthi Stapleton DPM  Dx:   Encounter Diagnosis     ICD-10-CM    1  Foot pain, left M79 672                   Assessment  Impairments: abnormal coordination, abnormal muscle firing, abnormal muscle tone, abnormal or restricted ROM, abnormal movement, activity intolerance, difficulty understanding, impaired physical strength, lacks appropriate home exercise program and pain with function    Assessment details: Roshan Baldwin is a pleasant 38 y o  female who presents with foot R foot pain  Patient reported that she had a foot injury about 2 years ago  She reported  That she had PT at the time  eventually she went back to work and eventually she reported that eventually she had a MRI and showed a stress fracture  Was in a cam boot  For 3 months  Patient reported that she is unable to work at this time  patient rpeorted that she now is cleaned to be WBAT out of the cam boot  Patient  reported that pain is about the same   Patient  Reported that she has pain with prolonged walking and standing  She will have to be able to stand 10 hours a day  She will be using a bone stimulator  Patient requires freuqnet break with IADL's  Understanding of Dx/Px/POC: good   Prognosis: good    Goals  Short Term:  Pt will report decreased levels of pain by at least 2 subjective ratings in 4 weeks  Pt will demonstrate improved ROM by at least 10 degrees in 4 weeks  Pt will demonstrate improved strength by 1/2 grade  Long Term:   Pt will be independent in their HEP in 8 weeks  Pt will be be pain free with IADL's  Pt will demonstrate improved FOTO, > 65    Plan  Patient would benefit from: skilled PT  Referral necessary: No  Planned modality interventions: thermotherapy: hydrocollator packs  Planned therapy interventions: activity modification, joint mobilization, manual therapy, motor coordination training, neuromuscular re-education, patient education, self care, therapeutic activities, therapeutic exercise, graded activity, home exercise program and behavior modification  Frequency: 2x week  Duration in weeks: 8  Treatment plan discussed with: patient  Plan details: ]        Subjective Evaluation    Pain  At best pain ratin  At worst pain rating: 10  Location: R foot   Objective     General Comments     Ankle/Foot Comments   Special test               Ankle:   anterior draw = -      talar tilt= -      tinel sign=      functional squat test =    foot in standing=  loading first ray= cotton test= pain with met head squeeze test      Antalgic gait pattern  DF : 5 PF : 45 inv :20 ever: 15  TTP: along 3-4th met and along the lateral ankle  Mid foot hypomobility  patient has tried store bought orthotics and multiple shoes over the past 2 years  She reported that sneakers are usually uncomfortable  Pain after prolonged walking        Sensation intact           Precautions: R 4th met fracture slow progressions      Daily Treatment Diary     Manual ISTM laser  Mid foot jt mobilizations              Post glide of the talus  Exercise Diary              Bike             Towel stretch              Abc's              Toe curls   q             solus strech                                                                                                                                                                                                                    Modalities

## 2018-09-04 NOTE — PROGRESS NOTES
PT Evaluation     Today's date: 2018  Patient name: Jacob Gabriel  : 1975  MRN: [id number]  Referring provider: Yolande Chung DPM  Dx:   Encounter Diagnosis     ICD-10-CM    1  Foot pain, left M79 672            did not retunr to PT after 2 visits D/C from care       Assessment  Impairments: abnormal coordination, abnormal muscle firing, abnormal muscle tone, abnormal or restricted ROM, abnormal movement, activity intolerance, difficulty understanding, impaired physical strength, lacks appropriate home exercise program and pain with function    Assessment details: Jacob Gabriel is a pleasant 38 y o  female who presents with foot R foot pain  Patient reported that she had a foot injury about 2 years ago  She reported  That she had PT at the time  eventually she went back to work and eventually she reported that eventually she had a MRI and showed a stress fracture  Was in a cam boot  For 3 months  Patient reported that she is unable to work at this time  patient rpeorted that she now is cleaned to be WBAT out of the cam boot  Patient  reported that pain is about the same  Patient  Reported that she has pain with prolonged walking and standing  She will have to be able to stand 10 hours a day  She will be using a bone stimulator  Patient requires freuqnet break with IADL's      Understanding of Dx/Px/POC: good   Prognosis: good    Goals  Short Term:  Pt will report decreased levels of pain by at least 2 subjective ratings in 4 weeks  Pt will demonstrate improved ROM by at least 10 degrees in 4 weeks  Pt will demonstrate improved strength by 1/2 grade  Long Term:   Pt will be independent in their HEP in 8 weeks  Pt will be be pain free with IADL's  Pt will demonstrate improved FOTO, > 65    Plan  Patient would benefit from: skilled PT  Referral necessary: No  Planned modality interventions: thermotherapy: hydrocollator packs  Planned therapy interventions: activity modification, joint mobilization, manual therapy, motor coordination training, neuromuscular re-education, patient education, self care, therapeutic activities, therapeutic exercise, graded activity, home exercise program and behavior modification  Frequency: 2x week  Duration in weeks: 8  Treatment plan discussed with: patient  Plan details: ]        Subjective Evaluation    Pain  At best pain ratin  At worst pain rating: 10  Location: R foot   Objective     General Comments     Ankle/Foot Comments   Special test               Ankle:   anterior draw = -      talar tilt= -      tinel sign=      functional squat test =    foot in standing=  loading first ray= cotton test= pain with met head squeeze test      Antalgic gait pattern  DF : 5 PF : 45 inv :20 ever: 15  TTP: along 3-4th met and along the lateral ankle  Mid foot hypomobility  patient has tried store bought orthotics and multiple shoes over the past 2 years  She reported that sneakers are usually uncomfortable  Pain after prolonged walking        Sensation intact           Precautions: R 4th met fracture slow progressions  Daily Treatment Diary     Manual              ISTM laser  Mid foot jt mobilizations              Post glide of the talus  Exercise Diary              Bike             Towel stretch              Abc's              Toe curls   q             solus strech                                                                                                                                                                                                                    Modalities

## 2018-09-07 ENCOUNTER — OFFICE VISIT (OUTPATIENT)
Dept: PHYSICAL THERAPY | Facility: OTHER | Age: 43
End: 2018-09-07
Payer: COMMERCIAL

## 2018-09-07 DIAGNOSIS — M79.672 FOOT PAIN, LEFT: Primary | ICD-10-CM

## 2018-09-07 PROCEDURE — 97010 HOT OR COLD PACKS THERAPY: CPT

## 2018-09-07 PROCEDURE — 97140 MANUAL THERAPY 1/> REGIONS: CPT

## 2018-09-07 PROCEDURE — 97110 THERAPEUTIC EXERCISES: CPT

## 2018-09-07 NOTE — PROGRESS NOTES
Daily Note     Today's date: 2018  Patient name: Sundeep Hobson  : 1975  MRN: [id number]  Referring provider: Ephraim Huerta DPM  Dx:   Encounter Diagnosis     ICD-10-CM    1  Foot pain, left M79 672            1 on 1 entire session with PTA       Subjective: Patient reports his pain gets worse in the evening  She reports mild pain today  Objective: See treatment diary below  Precautions: R 4th met fracture slow progressions      Daily Treatment Diary      Manual                        ISTM laser    MP                     Mid foot jt mobilizations                        Post glide of the talus                                                                               Exercise Diary                        Bike  5'                     Towel stretch; plantar flexor and gastroc  10"x10 ea                      Abc's   x1                     Toe curls    2'                     solus strech  10"x10                                                                                                                                                                                                                                                                                                                                                                                                   Modalities                                                                                Assessment: Tolerated treatment well  Good knowledge with HEP  Advised patient to not apply heat to foot for prolonged periods of time  Patient would benefit from continued PT      Plan: Continue per plan of care

## 2018-09-12 ENCOUNTER — APPOINTMENT (OUTPATIENT)
Dept: PHYSICAL THERAPY | Facility: OTHER | Age: 43
End: 2018-09-12
Payer: COMMERCIAL

## 2018-09-12 NOTE — PROGRESS NOTES
Daily Note     Today's date: 2018  Patient name: Primitivo Mcclellan  : 1975  MRN: [id number]  Referring provider: Karla Hagen DPM  Dx:   No diagnosis found  1 on 1 entire session with PTA       Subjective: Patient reports his pain gets worse in the evening  She reports mild pain today  Objective: See treatment diary below  Precautions: R 4th met fracture slow progressions      Daily Treatment Diary      Manual                        ISTM laser    MP                     Mid foot jt mobilizations                        Post glide of the talus                                                                               Exercise Diary                        Bike  5'                     Towel stretch; plantar flexor and gastroc  10"x10 ea                      Abc's   x1                     Toe curls    2'                     solus strech  10"x10                                                                                                                                                                                                                                                                                                                                                                                                   Modalities                                                                                Assessment: Tolerated treatment well  Good knowledge with HEP  Advised patient to not apply heat to foot for prolonged periods of time  Patient would benefit from continued PT      Plan: Continue per plan of care

## 2018-09-14 ENCOUNTER — OFFICE VISIT (OUTPATIENT)
Dept: PHYSICAL THERAPY | Facility: OTHER | Age: 43
End: 2018-09-14
Payer: COMMERCIAL

## 2018-09-14 DIAGNOSIS — M79.672 FOOT PAIN, LEFT: Primary | ICD-10-CM

## 2018-09-14 PROCEDURE — 97110 THERAPEUTIC EXERCISES: CPT | Performed by: PHYSICAL THERAPIST

## 2018-09-14 PROCEDURE — 97140 MANUAL THERAPY 1/> REGIONS: CPT | Performed by: PHYSICAL THERAPIST

## 2018-09-14 NOTE — PROGRESS NOTES
Daily Note     Today's date: 2018  Patient name: Tania Haddad  : 1975  MRN: [id number]  Referring provider: Gerson Almazan DPM  Dx:   Encounter Diagnosis     ICD-10-CM    1  Foot pain, left M79 672                  Subjective: Patient stated moderate pain prior to treatment session  Objective: See treatment diary below  Precautions: R 4th met fracture slow progressions      Daily Treatment Diary      Manual                      ISTM laser    MP  KK                   Mid foot jt mobilizations     KK                   Post glide of the talus      KK                                                                         Exercise Diary                      Bike  5'  np                   Towel stretch; plantar flexor and gastroc  10"x10 ea   10"x 10                    Abc's   x1  2x                   Toe curls    2'  2'                   soleus strech  10"x10  10"x  10                                                                                                                                                                                                                                                                                                                                                                                                 Modalities                                                                                Assessment: Patient performed exercises without significant c/o pain; minimal pain with mobilizations; positive response to Laser  Plan: Continue per plan of care

## 2018-09-25 ENCOUNTER — TELEPHONE (OUTPATIENT)
Dept: BARIATRICS | Facility: CLINIC | Age: 43
End: 2018-09-25

## 2018-10-23 ENCOUNTER — TRANSCRIBE ORDERS (OUTPATIENT)
Dept: ADMINISTRATIVE | Facility: HOSPITAL | Age: 43
End: 2018-10-23

## 2018-10-23 DIAGNOSIS — S92.344K CLOSED NONDISPLACED FRACTURE OF FOURTH METATARSAL BONE OF RIGHT FOOT WITH NONUNION, SUBSEQUENT ENCOUNTER: Primary | ICD-10-CM

## 2018-10-23 DIAGNOSIS — Z01.818 PREOP EXAMINATION: ICD-10-CM

## 2018-11-07 ENCOUNTER — HOSPITAL ENCOUNTER (OUTPATIENT)
Dept: RADIOLOGY | Facility: IMAGING CENTER | Age: 43
Discharge: HOME | End: 2018-11-07
Payer: COMMERCIAL

## 2018-11-07 DIAGNOSIS — S92.344K CLOSED NONDISPLACED FRACTURE OF FOURTH METATARSAL BONE OF RIGHT FOOT WITH NONUNION, SUBSEQUENT ENCOUNTER: ICD-10-CM

## 2018-11-07 PROCEDURE — 73718 MRI LOWER EXTREMITY W/O DYE: CPT

## 2018-11-16 ENCOUNTER — ANESTHESIA EVENT (OUTPATIENT)
Dept: PERIOP | Facility: AMBULARY SURGERY CENTER | Age: 43
End: 2018-11-16

## 2018-11-19 ENCOUNTER — HOSPITAL ENCOUNTER (OUTPATIENT)
Dept: RADIOLOGY | Facility: IMAGING CENTER | Age: 43
Discharge: HOME | End: 2018-11-19
Payer: COMMERCIAL

## 2018-11-19 ENCOUNTER — APPOINTMENT (OUTPATIENT)
Dept: LAB | Facility: IMAGING CENTER | Age: 43
End: 2018-11-19
Payer: COMMERCIAL

## 2018-11-19 DIAGNOSIS — Z01.818 PREOP EXAMINATION: ICD-10-CM

## 2018-11-19 LAB
ANION GAP SERPL CALCULATED.3IONS-SCNC: 4 MMOL/L (ref 4–13)
BASOPHILS # BLD AUTO: 0.07 THOUSANDS/ΜL (ref 0–0.1)
BASOPHILS NFR BLD AUTO: 1 % (ref 0–1)
BUN SERPL-MCNC: 11 MG/DL (ref 5–25)
CALCIUM SERPL-MCNC: 8 MG/DL (ref 8.3–10.1)
CHLORIDE SERPL-SCNC: 105 MMOL/L (ref 100–108)
CO2 SERPL-SCNC: 26 MMOL/L (ref 21–32)
CREAT SERPL-MCNC: 0.87 MG/DL (ref 0.6–1.3)
EOSINOPHIL # BLD AUTO: 0.21 THOUSAND/ΜL (ref 0–0.61)
EOSINOPHIL NFR BLD AUTO: 2 % (ref 0–6)
ERYTHROCYTE [DISTWIDTH] IN BLOOD BY AUTOMATED COUNT: 15.1 % (ref 11.6–15.1)
GFR SERPL CREATININE-BSD FRML MDRD: 81 ML/MIN/1.73SQ M
GLUCOSE P FAST SERPL-MCNC: 94 MG/DL (ref 65–99)
HCT VFR BLD AUTO: 44 % (ref 34.8–46.1)
HGB BLD-MCNC: 14 G/DL (ref 11.5–15.4)
IMM GRANULOCYTES # BLD AUTO: 0.05 THOUSAND/UL (ref 0–0.2)
IMM GRANULOCYTES NFR BLD AUTO: 1 % (ref 0–2)
LYMPHOCYTES # BLD AUTO: 1.81 THOUSANDS/ΜL (ref 0.6–4.47)
LYMPHOCYTES NFR BLD AUTO: 18 % (ref 14–44)
MCH RBC QN AUTO: 30.8 PG (ref 26.8–34.3)
MCHC RBC AUTO-ENTMCNC: 31.8 G/DL (ref 31.4–37.4)
MCV RBC AUTO: 97 FL (ref 82–98)
MONOCYTES # BLD AUTO: 0.68 THOUSAND/ΜL (ref 0.17–1.22)
MONOCYTES NFR BLD AUTO: 7 % (ref 4–12)
NEUTROPHILS # BLD AUTO: 7.2 THOUSANDS/ΜL (ref 1.85–7.62)
NEUTS SEG NFR BLD AUTO: 71 % (ref 43–75)
NRBC BLD AUTO-RTO: 0 /100 WBCS
PLATELET # BLD AUTO: 416 THOUSANDS/UL (ref 149–390)
PMV BLD AUTO: 9.2 FL (ref 8.9–12.7)
POTASSIUM SERPL-SCNC: 4.2 MMOL/L (ref 3.5–5.3)
RBC # BLD AUTO: 4.54 MILLION/UL (ref 3.81–5.12)
SODIUM SERPL-SCNC: 135 MMOL/L (ref 136–145)
WBC # BLD AUTO: 10.02 THOUSAND/UL (ref 4.31–10.16)

## 2018-11-19 PROCEDURE — 71046 X-RAY EXAM CHEST 2 VIEWS: CPT

## 2018-11-19 PROCEDURE — 80048 BASIC METABOLIC PNL TOTAL CA: CPT

## 2018-11-19 PROCEDURE — 36415 COLL VENOUS BLD VENIPUNCTURE: CPT

## 2018-11-19 PROCEDURE — 85025 COMPLETE CBC W/AUTO DIFF WBC: CPT

## 2018-11-29 ENCOUNTER — ANESTHESIA (OUTPATIENT)
Dept: PERIOP | Facility: AMBULARY SURGERY CENTER | Age: 43
End: 2018-11-29

## 2019-02-07 ENCOUNTER — TRANSCRIBE ORDERS (OUTPATIENT)
Dept: ADMINISTRATIVE | Facility: HOSPITAL | Age: 44
End: 2019-02-07

## 2019-02-08 ENCOUNTER — TRANSCRIBE ORDERS (OUTPATIENT)
Dept: ADMINISTRATIVE | Facility: HOSPITAL | Age: 44
End: 2019-02-08

## 2019-02-08 DIAGNOSIS — M79.671 RIGHT FOOT PAIN: Primary | ICD-10-CM

## 2019-02-14 ENCOUNTER — HOSPITAL ENCOUNTER (OUTPATIENT)
Dept: RADIOLOGY | Facility: IMAGING CENTER | Age: 44
Discharge: HOME | End: 2019-02-14
Payer: COMMERCIAL

## 2019-02-14 DIAGNOSIS — M79.671 RIGHT FOOT PAIN: ICD-10-CM

## 2019-02-14 PROCEDURE — 73700 CT LOWER EXTREMITY W/O DYE: CPT

## 2019-06-12 ENCOUNTER — HOSPITAL ENCOUNTER (EMERGENCY)
Facility: HOSPITAL | Age: 44
Discharge: HOME | End: 2019-06-12
Attending: EMERGENCY MEDICINE

## 2019-06-12 VITALS
BODY MASS INDEX: 43.25 KG/M2 | HEART RATE: 88 BPM | SYSTOLIC BLOOD PRESSURE: 187 MMHG | WEIGHT: 272.05 LBS | TEMPERATURE: 98 F | RESPIRATION RATE: 16 BRPM | DIASTOLIC BLOOD PRESSURE: 86 MMHG | OXYGEN SATURATION: 98 %

## 2019-06-12 DIAGNOSIS — H00.015 HORDEOLUM EXTERNUM OF LEFT LOWER EYELID: Primary | ICD-10-CM

## 2019-06-12 PROCEDURE — 99283 EMERGENCY DEPT VISIT LOW MDM: CPT

## 2019-06-12 PROCEDURE — 99282 EMERGENCY DEPT VISIT SF MDM: CPT | Performed by: EMERGENCY MEDICINE

## 2019-06-12 RX ORDER — ERYTHROMYCIN 5 MG/G
OINTMENT OPHTHALMIC
Qty: 1 G | Refills: 0 | Status: SHIPPED | OUTPATIENT
Start: 2019-06-12 | End: 2019-06-18

## 2019-08-11 ENCOUNTER — HOSPITAL ENCOUNTER (EMERGENCY)
Facility: HOSPITAL | Age: 44
Discharge: HOME | End: 2019-08-11
Attending: EMERGENCY MEDICINE | Admitting: EMERGENCY MEDICINE

## 2019-08-11 ENCOUNTER — APPOINTMENT (EMERGENCY)
Dept: CT IMAGING | Facility: HOSPITAL | Age: 44
End: 2019-08-11

## 2019-08-11 VITALS
WEIGHT: 271.17 LBS | BODY MASS INDEX: 43.11 KG/M2 | TEMPERATURE: 98.4 F | OXYGEN SATURATION: 98 % | HEART RATE: 87 BPM | DIASTOLIC BLOOD PRESSURE: 76 MMHG | RESPIRATION RATE: 16 BRPM | SYSTOLIC BLOOD PRESSURE: 123 MMHG

## 2019-08-11 DIAGNOSIS — L03.311 ABDOMINAL WALL CELLULITIS: Primary | ICD-10-CM

## 2019-08-11 DIAGNOSIS — R11.0 NAUSEA: ICD-10-CM

## 2019-08-11 LAB
ANION GAP SERPL CALCULATED.3IONS-SCNC: 12 MMOL/L (ref 4–13)
BASOPHILS # BLD AUTO: 0.02 THOUSANDS/ΜL (ref 0–0.1)
BASOPHILS NFR BLD AUTO: 0 % (ref 0–1)
BUN SERPL-MCNC: 8 MG/DL (ref 5–25)
CALCIUM SERPL-MCNC: 9.4 MG/DL (ref 8.3–10.1)
CHLORIDE SERPL-SCNC: 100 MMOL/L (ref 100–108)
CO2 SERPL-SCNC: 23 MMOL/L (ref 21–32)
CREAT SERPL-MCNC: 0.86 MG/DL (ref 0.6–1.3)
EOSINOPHIL # BLD AUTO: 0.03 THOUSAND/ΜL (ref 0–0.61)
EOSINOPHIL NFR BLD AUTO: 0 % (ref 0–6)
ERYTHROCYTE [DISTWIDTH] IN BLOOD BY AUTOMATED COUNT: 14.5 % (ref 11.6–15.1)
EXT PREG TEST URINE: NORMAL
EXT. CONTROL ED NAV: NORMAL
GFR SERPL CREATININE-BSD FRML MDRD: 82 ML/MIN/1.73SQ M
GLUCOSE SERPL-MCNC: 100 MG/DL (ref 65–140)
HCT VFR BLD AUTO: 41 % (ref 34.8–46.1)
HGB BLD-MCNC: 13.7 G/DL (ref 11.5–15.4)
IMM GRANULOCYTES # BLD AUTO: 0.37 THOUSAND/UL (ref 0–0.2)
IMM GRANULOCYTES NFR BLD AUTO: 2 % (ref 0–2)
LACTATE SERPL-SCNC: 1.1 MMOL/L (ref 0.5–2)
LYMPHOCYTES # BLD AUTO: 1.34 THOUSANDS/ΜL (ref 0.6–4.47)
LYMPHOCYTES NFR BLD AUTO: 7 % (ref 14–44)
MCH RBC QN AUTO: 31.8 PG (ref 26.8–34.3)
MCHC RBC AUTO-ENTMCNC: 33.4 G/DL (ref 31.4–37.4)
MCV RBC AUTO: 95 FL (ref 82–98)
MONOCYTES # BLD AUTO: 1.65 THOUSAND/ΜL (ref 0.17–1.22)
MONOCYTES NFR BLD AUTO: 8 % (ref 4–12)
NEUTROPHILS # BLD AUTO: 16.69 THOUSANDS/ΜL (ref 1.85–7.62)
NEUTS SEG NFR BLD AUTO: 83 % (ref 43–75)
NRBC BLD AUTO-RTO: 0 /100 WBCS
PLATELET # BLD AUTO: 410 THOUSANDS/UL (ref 149–390)
PMV BLD AUTO: 9.2 FL (ref 8.9–12.7)
POTASSIUM SERPL-SCNC: 3.4 MMOL/L (ref 3.5–5.3)
RBC # BLD AUTO: 4.31 MILLION/UL (ref 3.81–5.12)
SODIUM SERPL-SCNC: 135 MMOL/L (ref 136–145)
WBC # BLD AUTO: 20.1 THOUSAND/UL (ref 4.31–10.16)

## 2019-08-11 PROCEDURE — 81025 URINE PREGNANCY TEST: CPT | Performed by: EMERGENCY MEDICINE

## 2019-08-11 PROCEDURE — 85025 COMPLETE CBC W/AUTO DIFF WBC: CPT | Performed by: EMERGENCY MEDICINE

## 2019-08-11 PROCEDURE — 80048 BASIC METABOLIC PNL TOTAL CA: CPT | Performed by: EMERGENCY MEDICINE

## 2019-08-11 PROCEDURE — 99283 EMERGENCY DEPT VISIT LOW MDM: CPT | Performed by: EMERGENCY MEDICINE

## 2019-08-11 PROCEDURE — 36415 COLL VENOUS BLD VENIPUNCTURE: CPT | Performed by: EMERGENCY MEDICINE

## 2019-08-11 PROCEDURE — 96365 THER/PROPH/DIAG IV INF INIT: CPT

## 2019-08-11 PROCEDURE — 74177 CT ABD & PELVIS W/CONTRAST: CPT

## 2019-08-11 PROCEDURE — 83605 ASSAY OF LACTIC ACID: CPT | Performed by: EMERGENCY MEDICINE

## 2019-08-11 PROCEDURE — 99284 EMERGENCY DEPT VISIT MOD MDM: CPT

## 2019-08-11 PROCEDURE — 96375 TX/PRO/DX INJ NEW DRUG ADDON: CPT

## 2019-08-11 RX ORDER — CEPHALEXIN 500 MG/1
500 CAPSULE ORAL EVERY 6 HOURS SCHEDULED
Qty: 28 CAPSULE | Refills: 0 | Status: SHIPPED | OUTPATIENT
Start: 2019-08-11 | End: 2019-08-18

## 2019-08-11 RX ORDER — SULFAMETHOXAZOLE AND TRIMETHOPRIM 800; 160 MG/1; MG/1
2 TABLET ORAL ONCE
Status: COMPLETED | OUTPATIENT
Start: 2019-08-11 | End: 2019-08-11

## 2019-08-11 RX ORDER — METOCLOPRAMIDE HYDROCHLORIDE 5 MG/ML
10 INJECTION INTRAMUSCULAR; INTRAVENOUS ONCE
Status: COMPLETED | OUTPATIENT
Start: 2019-08-11 | End: 2019-08-11

## 2019-08-11 RX ORDER — NAPROXEN 500 MG/1
500 TABLET ORAL 2 TIMES DAILY PRN
Qty: 14 TABLET | Refills: 0 | Status: SHIPPED | OUTPATIENT
Start: 2019-08-11 | End: 2021-04-06 | Stop reason: HOSPADM

## 2019-08-11 RX ORDER — SULFAMETHOXAZOLE AND TRIMETHOPRIM 800; 160 MG/1; MG/1
2 TABLET ORAL 2 TIMES DAILY
Qty: 28 TABLET | Refills: 0 | Status: SHIPPED | OUTPATIENT
Start: 2019-08-11 | End: 2019-08-18

## 2019-08-11 RX ORDER — KETOROLAC TROMETHAMINE 30 MG/ML
15 INJECTION, SOLUTION INTRAMUSCULAR; INTRAVENOUS ONCE
Status: COMPLETED | OUTPATIENT
Start: 2019-08-11 | End: 2019-08-11

## 2019-08-11 RX ORDER — CEFAZOLIN SODIUM 2 G/50ML
2000 SOLUTION INTRAVENOUS ONCE
Status: COMPLETED | OUTPATIENT
Start: 2019-08-11 | End: 2019-08-11

## 2019-08-11 RX ORDER — METOCLOPRAMIDE 10 MG/1
10 TABLET ORAL EVERY 6 HOURS
Qty: 10 TABLET | Refills: 0 | Status: SHIPPED | OUTPATIENT
Start: 2019-08-11 | End: 2021-01-19 | Stop reason: HOSPADM

## 2019-08-11 RX ADMIN — KETOROLAC TROMETHAMINE 15 MG: 30 INJECTION, SOLUTION INTRAMUSCULAR at 15:26

## 2019-08-11 RX ADMIN — CEFAZOLIN SODIUM 2000 MG: 2 SOLUTION INTRAVENOUS at 17:46

## 2019-08-11 RX ADMIN — IOHEXOL 100 ML: 350 INJECTION, SOLUTION INTRAVENOUS at 16:40

## 2019-08-11 RX ADMIN — SULFAMETHOXAZOLE AND TRIMETHOPRIM 2 TABLET: 800; 160 TABLET ORAL at 17:46

## 2019-08-11 RX ADMIN — METOCLOPRAMIDE 10 MG: 5 INJECTION, SOLUTION INTRAMUSCULAR; INTRAVENOUS at 15:28

## 2019-08-11 NOTE — ED PROVIDER NOTES
History  Chief Complaint   Patient presents with    Abscess - Complicated     Reports "Lump started 3 days ago under right breast " States, "It popped a little " States fever, Tmax 102  Took tylenol PTA  37 y o  F p/w abscess x 3 days  Pt notes it started as a "pimple" on her upper abdomen  Reports worsening of pain and redness  Started draining  Pt reports having a fever  Denies h/o abscess, DM, IVDA  History provided by:  Patient   used: No    Abscess   Location:  Torso  Torso abscess location:  Abd RUQ  Abscess quality: draining, induration, painful and redness    Duration:  3 days  Progression:  Worsening  Chronicity:  New  Context: not diabetes, not immunosuppression, not injected drug use, not insect bite/sting and not skin injury    Worsened by:  Nothing  Ineffective treatments:  Warm compresses and NSAIDs (Tylenol)  Associated symptoms: fever and nausea    Associated symptoms: no vomiting        Prior to Admission Medications   Prescriptions Last Dose Informant Patient Reported? Taking?   acetaminophen (TYLENOL) 325 mg tablet   Yes Yes   Sig: Take 650 mg by mouth every 6 (six) hours as needed for mild pain   amLODIPine (NORVASC) 5 mg tablet More than a month at Unknown time  Yes No   Sig: Take 5 mg by mouth daily in the early morning   ibuprofen (MOTRIN) 800 mg tablet   No Yes   Sig: Take 1 tablet (800 mg total) by mouth every 8 (eight) hours as needed for mild pain      Facility-Administered Medications: None       Past Medical History:   Diagnosis Date    Hiatal hernia     History of transfusion     Years ago    Hypertension        Past Surgical History:   Procedure Laterality Date     SECTION      CHOLECYSTECTOMY         Family History   Problem Relation Age of Onset    Cancer Mother     Diabetes Father     Heart disease Father     Cancer Father      I have reviewed and agree with the history as documented      Social History     Tobacco Use    Smoking status: Current Every Day Smoker     Packs/day: 1 00     Types: Cigarettes    Smokeless tobacco: Never Used   Substance Use Topics    Alcohol use: No    Drug use: No        Review of Systems   Constitutional: Positive for fever  Negative for appetite change and chills  HENT: Negative for congestion and rhinorrhea  Respiratory: Negative for cough  Gastrointestinal: Positive for nausea  Negative for abdominal distention, abdominal pain, blood in stool, constipation, diarrhea and vomiting  Genitourinary: Negative for dysuria, flank pain, frequency, hematuria, urgency, vaginal bleeding and vaginal discharge  Musculoskeletal: Negative for back pain  Skin: Positive for rash  All other systems reviewed and are negative  Physical Exam  Physical Exam   Constitutional: She is oriented to person, place, and time  She appears well-developed and well-nourished  No distress  HENT:   Head: Normocephalic  Mouth/Throat: Oropharynx is clear and moist and mucous membranes are normal    Neck: Normal range of motion  Neck supple  Cardiovascular: Normal rate, regular rhythm, normal heart sounds and intact distal pulses  Exam reveals no gallop and no friction rub  No murmur heard  Pulmonary/Chest: Effort normal and breath sounds normal  No respiratory distress  She has no wheezes  She has no rales  Abdominal: Soft  Normal appearance and bowel sounds are normal  She exhibits no distension and no mass  There is no hepatosplenomegaly  There is no tenderness  There is no rigidity, no rebound, no guarding, no CVA tenderness, no tenderness at McBurney's point and negative Ozuna's sign  Lymphadenopathy:     She has no cervical adenopathy  Neurological: She is alert and oriented to person, place, and time  Skin: Skin is warm, dry and intact  No rash noted  There is erythema (RUQ )  No pallor  Nursing note and vitals reviewed        Vital Signs  ED Triage Vitals [08/11/19 1402]   Temperature Pulse Respirations Blood Pressure SpO2   99 5 °F (37 5 °C) (!) 113 16 127/77 98 %      Temp Source Heart Rate Source Patient Position - Orthostatic VS BP Location FiO2 (%)   Oral Monitor Sitting Right arm --      Pain Score       8           Vitals:    08/11/19 1502 08/11/19 1601 08/11/19 1741 08/11/19 1830   BP: 102/58 103/56 121/61 123/76   Pulse: 98 89 80 87   Patient Position - Orthostatic VS:  Lying Sitting Sitting         Visual Acuity      ED Medications  Medications   ketorolac (TORADOL) injection 15 mg (15 mg Intravenous Given 8/11/19 1526)   metoclopramide (REGLAN) injection 10 mg (10 mg Intravenous Given 8/11/19 1528)   iohexol (OMNIPAQUE) 350 MG/ML injection (MULTI-DOSE) 100 mL (100 mL Intravenous Given 8/11/19 1640)   ceFAZolin (ANCEF) IVPB (premix) 2,000 mg (0 mg Intravenous Stopped 8/11/19 1830)   sulfamethoxazole-trimethoprim (BACTRIM DS) 800-160 mg per tablet 2 tablet (2 tablets Oral Given 8/11/19 1746)       Diagnostic Studies  Results Reviewed     Procedure Component Value Units Date/Time    Lactic acid, plasma [[id number]]  (Normal) Collected:  08/11/19 1541    Lab Status:  Final result Specimen:  Blood from Arm, Left Updated:  08/11/19 1611     LACTIC ACID 1 1 mmol/L     Narrative:       Result may be elevated if tourniquet was used during collection      POCT pregnancy, urine [[id number]]  (Normal) Resulted:  08/11/19 1557    Lab Status:  Final result Updated:  08/11/19 1557     EXT PREG TEST UR (Ref: Negative) NEG (-)     Control Valid    Basic metabolic panel [[id number]]  (Abnormal) Collected:  08/11/19 1430    Lab Status:  Final result Specimen:  Blood from Arm, Left Updated:  08/11/19 1547     Sodium 135 mmol/L      Potassium 3 4 mmol/L      Chloride 100 mmol/L      CO2 23 mmol/L      ANION GAP 12 mmol/L      BUN 8 mg/dL      Creatinine 0 86 mg/dL      Glucose 100 mg/dL      Calcium 9 4 mg/dL      eGFR 82 ml/min/1 73sq m     Narrative:       [place] guidelines for Chronic Kidney Disease (CKD):     Stage 1 with normal or high GFR (GFR > 90 mL/min/1 73 square meters)    Stage 2 Mild CKD (GFR = 60-89 mL/min/1 73 square meters)    Stage 3A Moderate CKD (GFR = 45-59 mL/min/1 73 square meters)    Stage 3B Moderate CKD (GFR = 30-44 mL/min/1 73 square meters)    Stage 4 Severe CKD (GFR = 15-29 mL/min/1 73 square meters)    Stage 5 End Stage CKD (GFR <15 mL/min/1 73 square meters)  Note: GFR calculation is accurate only with a steady state creatinine    CBC and differential [[id number]]  (Abnormal) Collected:  08/11/19 1430    Lab Status:  Final result Specimen:  Blood from Arm, Left Updated:  08/11/19 1536     WBC 20 10 Thousand/uL      RBC 4 31 Million/uL      Hemoglobin 13 7 g/dL      Hematocrit 41 0 %      MCV 95 fL      MCH 31 8 pg      MCHC 33 4 g/dL      RDW 14 5 %      MPV 9 2 fL      Platelets 494 Thousands/uL      nRBC 0 /100 WBCs      Neutrophils Relative 83 %      Immat GRANS % 2 %      Lymphocytes Relative 7 %      Monocytes Relative 8 %      Eosinophils Relative 0 %      Basophils Relative 0 %      Neutrophils Absolute 16 69 Thousands/µL      Immature Grans Absolute 0 37 Thousand/uL      Lymphocytes Absolute 1 34 Thousands/µL      Monocytes Absolute 1 65 Thousand/µL      Eosinophils Absolute 0 03 Thousand/µL      Basophils Absolute 0 02 Thousands/µL                  CT abdomen pelvis with contrast   Final Result by Verónica Aguilar MD (08/11 1714)      New fat-containing umbilical hernia  No induration  Cellulitis along the right abdominal wall  No abscess  Hepatomegaly with fatty infiltration unchanged  Workstation performed: [id number]                    Procedures  Procedures       ED Course  ED Course as of Aug 11 1831   Sun Aug 11, 2019   1502 Improved from 113   Pulse: 98   1537 WBC(!): 20 10   1730 Pt updated on results  I offered pt admission, however pt does not want to stay, but she is willing to stay for a dose of IV abx    I instructed pt to return for inability to tolerate PO or worsening symptoms  [facility]  Number of Diagnoses or Management Options     Amount and/or Complexity of Data Reviewed  Clinical lab tests: reviewed and ordered  Tests in the radiology section of CPT®: ordered and reviewed        Disposition  Final diagnoses:   Abdominal wall cellulitis   Nausea     Time reflects when diagnosis was documented in both MDM as applicable and the Disposition within this note     Time User Action Codes Description Comment    8/11/2019  5:30 PM Yana Yen 48 [K02 407] Abdominal wall cellulitis     8/11/2019  5:35 PM Yana Yen 48 [R11 0] Nausea       ED Disposition     ED Disposition Condition Date/Time Comment    Discharge Stable Sun Aug 11, 2019  5:32 PM Joy Higgins discharge to home/self care              Follow-up Information     Follow up With Specialties Details Why Contact Info Additional Information    Deepthi Briseno MD Family Medicine Schedule an appointment as soon as possible for a visit   [address]  [facility] [address] Emergency Department Emergency Medicine Go to  If symptoms worsen [facility] [id number]  [place] [address] ED, [address] [place], [place], [address]          Patient's Medications   Discharge Prescriptions    CEPHALEXIN (KEFLEX) 500 MG CAPSULE    Take 1 capsule (500 mg total) by mouth every 6 (six) hours for 7 days       Start Date: 8/11/2019 End Date: 8/18/2019       Order Dose: 500 mg       Quantity: 28 capsule    Refills: 0    METOCLOPRAMIDE (REGLAN) 10 MG TABLET    Take 1 tablet (10 mg total) by mouth every 6 (six) hours       Start Date: 8/11/2019 End Date: --       Order Dose: 10 mg       Quantity: 10 tablet    Refills: 0    NAPROXEN (NAPROSYN) 500 MG TABLET    Take 1 tablet (500 mg total) by mouth 2 (two) times a day as needed for mild pain (with meals) for up to 7 days       Start Date: 8/11/2019 End Date: 8/18/2019       Order Dose: 500 mg       Quantity: 14 tablet    Refills: 0    SULFAMETHOXAZOLE-TRIMETHOPRIM (BACTRIM DS) 800-160 MG PER TABLET    Take 2 tablets by mouth 2 (two) times a day for 7 days smx-tmp DS (BACTRIM) 800-160 mg tabs (1tab q12 D10)       Start Date: 8/11/2019 End Date: 8/18/2019       Order Dose: 2 tablets       Quantity: 28 tablet    Refills: 0     No discharge procedures on file      ED Provider  Electronically Signed by           Rubi Grider 22, DO  08/11/19 6480

## 2021-01-15 ENCOUNTER — OFFICE VISIT (OUTPATIENT)
Dept: OBGYN CLINIC | Facility: MEDICAL CENTER | Age: 46
End: 2021-01-15
Payer: COMMERCIAL

## 2021-01-15 VITALS
WEIGHT: 271 LBS | SYSTOLIC BLOOD PRESSURE: 161 MMHG | BODY MASS INDEX: 43.09 KG/M2 | DIASTOLIC BLOOD PRESSURE: 104 MMHG | HEART RATE: 106 BPM

## 2021-01-15 DIAGNOSIS — M17.12 PRIMARY OSTEOARTHRITIS OF LEFT KNEE: Primary | ICD-10-CM

## 2021-01-15 PROCEDURE — 99213 OFFICE O/P EST LOW 20 MIN: CPT | Performed by: ORTHOPAEDIC SURGERY

## 2021-01-15 PROCEDURE — 20610 DRAIN/INJ JOINT/BURSA W/O US: CPT | Performed by: ORTHOPAEDIC SURGERY

## 2021-01-15 RX ORDER — BUPIVACAINE HYDROCHLORIDE 2.5 MG/ML
4 INJECTION, SOLUTION INFILTRATION; PERINEURAL
Status: COMPLETED | OUTPATIENT
Start: 2021-01-15 | End: 2021-01-15

## 2021-01-15 RX ORDER — METHYLPREDNISOLONE ACETATE 40 MG/ML
1 INJECTION, SUSPENSION INTRA-ARTICULAR; INTRALESIONAL; INTRAMUSCULAR; SOFT TISSUE
Status: COMPLETED | OUTPATIENT
Start: 2021-01-15 | End: 2021-01-15

## 2021-01-15 RX ADMIN — METHYLPREDNISOLONE ACETATE 1 ML: 40 INJECTION, SUSPENSION INTRA-ARTICULAR; INTRALESIONAL; INTRAMUSCULAR; SOFT TISSUE at 11:30

## 2021-01-15 RX ADMIN — BUPIVACAINE HYDROCHLORIDE 4 ML: 2.5 INJECTION, SOLUTION INFILTRATION; PERINEURAL at 11:30

## 2021-01-15 NOTE — PROGRESS NOTES
Orthopaedic Surgery - Office Note  Toma Norman (16 y o  female)   : 1975   MRN: [id number]  Encounter Date: 1/15/2021    Chief Complaint   Patient presents with    Left Knee - Follow-up       Assessment / Plan  left knee osteoarthritis    · CSI of left knee joint was performed  · Activity as tolerated  · Neoprene knee sleeve  · Home exercise program reviewed  · Focus on progressing strength  · Anti-inflammatories or Tylenol prn pain  · Compression ice for swelling and pain control  · XR AT NEXT VISIT:  left knee, Knee series views  Return in about 6 weeks (around 2021)  History of Present Illness  Toma Norman is a 50 y o  female who presents for follow-up evaluation of left knee  She states she has had no improvement of her left knee pain since her last appointment in 2018  She did have a lapse of insurance coverage during this time  She has also been unemployed the last year and half  She experiences a constant severe pain in her left knee  Weight-bearing activities do increase her left knee pain  She does continue to experience left knee instability  She states the cortisone injection her last appointment did provide her with moderate relief of her symptoms  She is interested in another cortisone injection for her left knee today's appointment  She denies any further injury or trauma to her left knee  She denies any distal paresthesias  Review of Systems  Pertinent items are noted in HPI  All other systems were reviewed and are negative  Physical Exam  BP (!) 161/104   Pulse (!) 106   Wt 123 kg (271 lb)   BMI 43 09 kg/m²   Cons: Appears well  No apparent distress  Psych: Alert  Oriented x3  Mood and affect normal   Eyes: PERRLA, EOMI  Resp: Normal effort  No audible wheezing or stridor  CV: Palpable pulse  No discernable arrhythmia  No LE edema  Lymph:  No palpable cervical, axillary, or inguinal lymphadenopathy  Skin: Warm  No palpable masses    No visible lesions  Neuro: Normal muscle tone  Normal and symmetric DTR's  Left Knee Exam  Alignment:  Normal knee alignment  Inspection:  No swelling  No edema  No erythema  No ecchymosis  Palpation:  medial and lateral joint line tenderness  trace effusion  No warmth  No crepitus  ROM:  Knee Extension 5  Knee Flexion 110  Strength:  Able to actively extend knee against gravity  Stability:  No objective knee instability  Stable Varus / Valgus stress, Lachman, and Posterior drawer  Tests:  (+) Chavez  Patella:  Patella tracks centrally with crepitus  Neurovascular:  Sensation intact in DP/SP/George/Sa/T nerve distributions  2+ DP & PT pulses  Gait:  Antalgic  Studies Reviewed  XR of left knee - Personally reviewed the x-rays obtained on May 4, 2018 which demonstrate moderate osteoarthritis and medial compartment joint narrowing  MRI of left knee - I personally reviewed the MRI obtained on April 18, 2018 which demonstrates a complete tear of the posterior root medial meniscus and tricompartmental degenerative changes including full-thickness medial and patellofemoral compartment cartilage lost    Large joint arthrocentesis: L knee  Universal Protocol:  Consent: Verbal consent obtained  Risks and benefits: risks, benefits and alternatives were discussed  Consent given by: patient  Time out: Immediately prior to procedure a "time out" was called to verify the correct patient, procedure, equipment, support staff and site/side marked as required    Timeout called at: 1/15/2021 11:28 AM   Site marked: the operative site was marked  Patient identity confirmed: verbally with patient    Supporting Documentation  Indications: pain and diagnostic evaluation   Procedure Details  Location: knee - L knee  Preparation: Patient was prepped and draped in the usual sterile fashion  Needle size: 22 G  Ultrasound guidance: no  Approach: lateral  Medications administered: 4 mL bupivacaine 0 25 %; 1 mL methylPREDNISolone acetate 40 mg/mL    Patient tolerance: patient tolerated the procedure well with no immediate complications  Dressing:  Sterile dressing applied             Medical, Surgical, Family, and Social History  The patient's medical history, family history, and social history, were reviewed and updated as appropriate      Past Medical History:   Diagnosis Date    Hiatal hernia     History of transfusion     Years ago    Hypertension        Past Surgical History:   Procedure Laterality Date     SECTION      CHOLECYSTECTOMY         Family History   Problem Relation Age of Onset   Cyrus Jessica Cancer Mother     Diabetes Father     Heart disease Father     Cancer Father        Social History     Occupational History    Not on file   Tobacco Use    Smoking status: Current Every Day Smoker     Packs/day: 1 00     Types: Cigarettes    Smokeless tobacco: Never Used   Substance and Sexual Activity    Alcohol use: No    Drug use: No    Sexual activity: Not on file       No Known Allergies      Current Outpatient Medications:     acetaminophen (TYLENOL) 325 mg tablet, Take 650 mg by mouth every 6 (six) hours as needed for mild pain, Disp: , Rfl:     amLODIPine (NORVASC) 5 mg tablet, Take 5 mg by mouth daily in the early morning, Disp: , Rfl:     ibuprofen (MOTRIN) 800 mg tablet, Take 1 tablet (800 mg total) by mouth every 8 (eight) hours as needed for mild pain, Disp: 40 tablet, Rfl: 1    metoclopramide (REGLAN) 10 mg tablet, Take 1 tablet (10 mg total) by mouth every 6 (six) hours, Disp: 10 tablet, Rfl: 0    naproxen (NAPROSYN) 500 mg tablet, Take 1 tablet (500 mg total) by mouth 2 (two) times a day as needed for mild pain (with meals) for up to 7 days, Disp: 14 tablet, Rfl: 0      Eula Galeas    Scribe Attestation    I,:  Susie Granados am acting as a scribe while in the presence of the attending physician :       I,:  Ani Gan MD personally performed the services described in this documentation    as scribed in my presence :

## 2021-01-19 ENCOUNTER — OFFICE VISIT (OUTPATIENT)
Dept: OBGYN CLINIC | Facility: OTHER | Age: 46
End: 2021-01-19
Payer: COMMERCIAL

## 2021-01-19 ENCOUNTER — APPOINTMENT (OUTPATIENT)
Dept: RADIOLOGY | Facility: OTHER | Age: 46
End: 2021-01-19
Payer: COMMERCIAL

## 2021-01-19 ENCOUNTER — TELEPHONE (OUTPATIENT)
Dept: OBGYN CLINIC | Facility: HOSPITAL | Age: 46
End: 2021-01-19

## 2021-01-19 VITALS
HEART RATE: 91 BPM | DIASTOLIC BLOOD PRESSURE: 88 MMHG | BODY MASS INDEX: 45.47 KG/M2 | SYSTOLIC BLOOD PRESSURE: 153 MMHG | WEIGHT: 286 LBS

## 2021-01-19 DIAGNOSIS — M79.671 RIGHT FOOT PAIN: ICD-10-CM

## 2021-01-19 DIAGNOSIS — M19.071 PRIMARY OSTEOARTHRITIS OF RIGHT FOOT: ICD-10-CM

## 2021-01-19 DIAGNOSIS — M77.41 METATARSALGIA OF RIGHT FOOT: ICD-10-CM

## 2021-01-19 DIAGNOSIS — M84.374A STRESS FRACTURE OF RIGHT FOOT, INITIAL ENCOUNTER: ICD-10-CM

## 2021-01-19 DIAGNOSIS — M65.28 CALCIFIC ACHILLES TENDINITIS OF RIGHT LOWER EXTREMITY: ICD-10-CM

## 2021-01-19 DIAGNOSIS — M79.671 RIGHT FOOT PAIN: Primary | ICD-10-CM

## 2021-01-19 DIAGNOSIS — M79.671 CHRONIC PAIN IN RIGHT FOOT: ICD-10-CM

## 2021-01-19 DIAGNOSIS — G89.29 CHRONIC PAIN IN RIGHT FOOT: ICD-10-CM

## 2021-01-19 PROCEDURE — 99214 OFFICE O/P EST MOD 30 MIN: CPT | Performed by: FAMILY MEDICINE

## 2021-01-19 PROCEDURE — 73630 X-RAY EXAM OF FOOT: CPT

## 2021-01-19 RX ORDER — IBUPROFEN 200 MG
200 TABLET ORAL EVERY 6 HOURS PRN
COMMUNITY
End: 2021-04-06 | Stop reason: HOSPADM

## 2021-01-19 NOTE — TELEPHONE ENCOUNTER
Patient sees Dr Erika Nugent  Patient is calling in stating that she was sent to [place] [id number]  to get a low tide boot and a gel insert  She is asking if the script can be updated so that her insurance can cover this  Listed on the script it stated chronic pain it needs to list as to why she is being put in the boot such as possible fracture  She is asking if this can be updated and faxed over to them as soon as possible since she is currently there        Fax# [phone]

## 2021-01-19 NOTE — PATIENT INSTRUCTIONS
Discussed with patient that it is possible she continues to have a nonhealing stress fracture of the 4th metatarsal   We reviewed possible options of care and alternatives,  as well as x-ray with the patient  At this time she was amenable to use of the Short CAM boot, aith a gel heel cup  She should limit extended periods of walking or standing on the right foot, and can Use Tylenol or NSAIDs as needed for pain  A referral was provided for a surgical evaluation with Dr Barnett Wallingford  She should follow-up with him, but can call or return to office as needed for any questions or concerns

## 2021-01-19 NOTE — PROGRESS NOTES
1  Right foot pain  XR foot 3+ vw right   2  Chronic pain in right foot  Ambulatory referral to Orthopedic Surgery    Cam Boot    Heel Cup    CANCELED: Cam Boot    CANCELED: Heel Cup   3  Primary osteoarthritis of right foot  Ambulatory referral to Orthopedic Surgery    CANCELED: Cam Boot    CANCELED: Heel Cup   4  Metatarsalgia of right foot  Ambulatory referral to Orthopedic Surgery    CANCELED: Cam Boot    CANCELED: Heel Cup   5  Stress fracture of right foot, initial encounter  Cam Boot    Heel Cup   6  Calcific Achilles tendinitis of right lower extremity       Orders Placed This Encounter   Procedures    Cam Boot    Heel Cup    XR foot 3+ vw right    Ambulatory referral to Orthopedic Surgery        Imaging Studies (I personally reviewed images in PACS and report):  XR Right Foot - 1/19/21 - Similar appearance of cortical thickening on 4th metatarsal, calcific tendinitis of the achilles,  Plantar calcaneal bone spur    Past Diagnostics:   X-ray right foot 08/02/2018:  Stable alignment  No visible fracture line 4th metatarsal   There is thickening of cortex 4th metatarsal   There is no definitive evidence of healing    MRI right foot 05/04/2018:  Edema 4th metatarsal  base  Midfoot arthritis  X-ray right foot 06/11/2018:  Stable alignment of metatarsal   There is thickening of 4th metatarsal cortex consistent with stress fracture  IMPRESSION:  Chronic right foot pain  Possible stress fracture of the 4th metatarsal   Right calcific tendinitis of the Achilles      Repeat X-ray next visit: None    Return for F/U with Dr Alina Rangel   Patient Instructions    Discussed with patient that it is possible she continues to have a nonhealing stress fracture of the 4th metatarsal   We reviewed possible options of care and alternatives,  as well as x-ray with the patient  At this time she was amenable to use of the Short CAM boot, aith a gel heel cup    She should limit extended periods of walking or standing on the right foot, and can Use Tylenol or NSAIDs as needed for pain  A referral was provided for a surgical evaluation with Dr Karen Keyes  She should follow-up with him, but can call or return to office as needed for any questions or concerns  CHIEF COMPLAINT:  Right Foot pain    HPI:  Dennis Julien is a 50 y o  female with PMH of obesity, Left knee OA, bilateral flexible pes planus, right 4th metatarsal stress fracture, who presents for continued right foot pain  Visit 1/20/2021 :  Dewayne Lanes today stating she has chronic right metatarsal pain that has not resolved since she was last seen in 2018  The pain is severe in nature, and limits her to standing less than 10 minutes before the pain becomes intolerable  She states since she was last seen she had been referred to a podiatrist for a tarsal metatarsal fusion, but ultimately surgery was canceled due to issues between the patient and the podiatrist   She sought a 2nd opinion through court date at [facility] and another surgery was planned for her foot however she lost her health insurance due to extended time on medical leave for this  Foot pathology  Due to being out of work and having high stress patient has gained weight, and now has pain in her left knee for which she sees Dr Tim Villareal  She also reports pain in her right heel at times as well as the right midfoot  She has been walking on it, and today is wearing slides with socks as she has discomfort in closed shoes  No new injury or trauma  No numbness or tingling into the foot  No new imaging since MRI in 2018  Review of Systems   Constitutional: Negative for chills, fever and unexpected weight change  HENT: Negative for hearing loss, nosebleeds and sore throat  Eyes: Negative for pain, redness and visual disturbance  Respiratory: Negative for cough, shortness of breath and wheezing  Cardiovascular: Negative for chest pain, palpitations and leg swelling  Gastrointestinal: Negative for abdominal pain, nausea and vomiting  Endocrine: Negative for polydipsia and polyuria  Genitourinary: Negative for dysuria and hematuria  Skin: Negative for rash and wound  Neurological: Negative for dizziness, numbness and headaches  Psychiatric/Behavioral: Positive for dysphoric mood (Slightly upset in the room about her prior issues with podiatry and continued pain)  Negative for confusion, decreased concentration and suicidal ideas  Following history reviewed and update:    Past Medical History:   Diagnosis Date    Hiatal hernia     History of transfusion     Years ago    Hypertension      Past Surgical History:   Procedure Laterality Date     SECTION      CHOLECYSTECTOMY       Social History   Social History     Substance and Sexual Activity   Alcohol Use No     Social History     Substance and Sexual Activity   Drug Use No     Social History     Tobacco Use   Smoking Status Current Every Day Smoker    Packs/day: 1 00    Types: Cigarettes   Smokeless Tobacco Never Used     Family History   Problem Relation Age of Onset    Cancer Mother     Diabetes Father     Heart disease Father     Cancer Father      No Known Allergies     Physical Exam  /88 (BP Location: Right arm, Patient Position: Sitting, Cuff Size: Adult)   Pulse 91   Wt 130 kg (286 lb)   BMI 45 47 kg/m²     Constitutional:  see vital signs  Gen: well-developed, normocephalic/atraumatic, well-groomed, obese  Eyes: No inflammation or discharge of conjunctiva or lids; sclera clear   Pharynx: no inflammation, lesion, or mass of lips  Neck: supple, no masses, non-distended  MSK: no inflammation, lesion, mass, or clubbing of nails and digits except for other than mentioned below  SKIN: no visible rashes or skin lesions  Pulmonary/Chest: Effort normal  No respiratory distress     NEURO: cranial nerves grossly intact  PSYCH:  Alert and oriented to person, place, and time; recent and remote memory intact; Tearful in the room at times when discussing her medical conditions  Ortho Exam  Right Calf exam:  No swelling erythema or increased warmth  No palpable cords  Tenderness: none  Ashish Dorsiflexion DVT Test: Negative  (slight knee flexion, passive abrupt ankle dorsiflexion, squeeze calf)    Right foot exam  No swelling, erythema or increased warmth  Tenderness:  Over 3rd and 4th metatarsal shaft and base  ROM Toes extension: intact  ROM Toes flexion: intact  Strength Toes: 5/5 flex, ext  Sensation intact  Capillary refill intact    Right Lisfranc Assessment:  Plantar Ecchymosis:  Negative  Pronation Abduction test:  Negative  (forefoot abducted, pronate foot, hindfoot kept in place)  Tarsometatarsal Squeeze test:  Negative  (thumb lateral midfoot, other fingers medial midfoot, squeeze 1st & 5th rays)  Single Limb Heel Rise:  (unilateral stand on "tip toe":)  Piano Key Test:  Negative  (hindfoot stabilized, passive dorsiflexion & plantar flexion at TMT joint)    RIGHT ANKLE EXAM  Inspection  Erythema: no  Ecchymosis: no  Edema:  None   Adair's deformity noted of the right heel    Tenderness  Proximal Fibula: no  (Maisonneuve frx)  AiTFL: no  (2cm proximal-medial to tip lateral malleolus 92% sens, 29% spec)  ATFL: no  CFL: no  PTFL: no  Achilles:     At insertion,  Where a large tissue prominence is noted  deltoid: No  Peroneal: no  Tibialis Anterior: no  Tibialis Posterior: no    Bony Tenderpoints:  Lateral Malleolus: no  Base of 5th MT: no  Medial Malleolus: no  Navicular: no  Talar dome: No    ROM  Dorsiflexion: intact  Plantarflexion: intact    Muscle Strength  Pronation: intact without pain  Supination: intact without pain    Calcaneal Squeeze: negative  Tarsal squeeze:  Mildly painful

## 2021-01-27 NOTE — TELEPHONE ENCOUNTER
Spoke to patient  She was able to get the cam boot and heel cup the same day  She went directly to [facility] after office visit  She has appt to see Dr Bouchra Lin 02/02/2021

## 2021-02-03 ENCOUNTER — TELEPHONE (OUTPATIENT)
Dept: OBGYN CLINIC | Facility: CLINIC | Age: 46
End: 2021-02-03

## 2021-02-26 ENCOUNTER — APPOINTMENT (OUTPATIENT)
Dept: RADIOLOGY | Facility: MEDICAL CENTER | Age: 46
End: 2021-02-26
Payer: COMMERCIAL

## 2021-02-26 ENCOUNTER — OFFICE VISIT (OUTPATIENT)
Dept: OBGYN CLINIC | Facility: MEDICAL CENTER | Age: 46
End: 2021-02-26
Payer: COMMERCIAL

## 2021-02-26 VITALS
WEIGHT: 271 LBS | HEART RATE: 55 BPM | BODY MASS INDEX: 42.53 KG/M2 | DIASTOLIC BLOOD PRESSURE: 55 MMHG | HEIGHT: 67 IN | SYSTOLIC BLOOD PRESSURE: 94 MMHG

## 2021-02-26 DIAGNOSIS — M17.12 PRIMARY OSTEOARTHRITIS OF LEFT KNEE: Primary | ICD-10-CM

## 2021-02-26 DIAGNOSIS — M17.12 PRIMARY OSTEOARTHRITIS OF LEFT KNEE: ICD-10-CM

## 2021-02-26 PROCEDURE — 99213 OFFICE O/P EST LOW 20 MIN: CPT | Performed by: ORTHOPAEDIC SURGERY

## 2021-02-26 PROCEDURE — 73564 X-RAY EXAM KNEE 4 OR MORE: CPT

## 2021-02-26 NOTE — PROGRESS NOTES
Orthopaedic Surgery - Office Note  Aung Chew (32 y o  female)   : 1975   MRN: [id number]  Encounter Date: 2021    Chief Complaint   Patient presents with    Left Knee - Follow-up       Assessment / Plan  Left knee arthritis     · Continue HEP vs formal PT   · PT script was provided today   · Tylenol as needed for pain control   · Voltaren Gel may be beneficial along with ice and heat application   · CSI can be repeated every 3-4 months as needed   · Discussed Visco injections, prior auth was submitted today   · Discussed weight loss, she is seeing weight management   · Parminder Frank will be seeing a different doctor for a second opinion regarding her right foot   Return once visco is approved  History of Present Illness  Aung Chew is a 50 y o  female who presents to the office today for left knee pain  Parminder Frank was last seen in the office aprox  6 weeks ago  At that time she underwent a left knee CSI  She states that the CSI gave her partial relief of her symptoms for aprox  2 weeks  She notes continued pain to the posterior, medial and lateral aspect of her knee  Her pain is worse with WB activities and is better at rest  Parminder Frank has been performing a HEP  She notes her left knee with give out as well as lock  Parminder Frank is not currently working  Review of Systems  Pertinent items are noted in HPI  All other systems were reviewed and are negative  Physical Exam  BP 94/55   Pulse 55   Ht 5' 7" (1 702 m)   Wt 123 kg (271 lb)   BMI 42 44 kg/m²   Cons: Appears well  No apparent distress  Psych: Alert  Oriented x3  Mood and affect normal   Eyes: PERRLA, EOMI  Resp: Normal effort  No audible wheezing or stridor  CV: Palpable pulse  No discernable arrhythmia  No LE edema  Lymph:  No palpable cervical, axillary, or inguinal lymphadenopathy  Skin: Warm  No palpable masses  No visible lesions  Neuro: Normal muscle tone  Normal and symmetric DTR's       Left Knee Exam  Alignment:  Normal knee alignment  Inspection:  mild swelling  No erythema  No ecchymosis  No deformity  Trace effusion  Palpation:  medial and lateral joint line tenderness  ROM:  Knee Extension 5  Knee Flexion 110  Strength:  Able to actively extend knee against gravity  Stability:  No objective knee instability  Stable Varus / Valgus stress, Lachman, and Posterior drawer  Tests:  (+) Chavez  Patella:  Patella tracks centrally with crepitus  Neurovascular:  Sensation intact in DP/SP/George/Sa/T nerve distributions  2+ DP & PT pulses  Gait:  Antalgic  Studies Reviewed  I have personally reviewed pertinent films in PACS  XR of left knee - advancement of osteoarthritis since previous films in 2018  Procedures  No procedures today  Medical, Surgical, Family, and Social History  The patient's medical history, family history, and social history, were reviewed and updated as appropriate      Past Medical History:   Diagnosis Date    Hiatal hernia     History of transfusion     Years ago    Hypertension        Past Surgical History:   Procedure Laterality Date     SECTION      CHOLECYSTECTOMY         Family History   Problem Relation Age of Onset   Christie Tapia Cancer Mother     Diabetes Father     Heart disease Father     Cancer Father        Social History     Occupational History    Not on file   Tobacco Use    Smoking status: Current Every Day Smoker     Packs/day: 1 00     Types: Cigarettes    Smokeless tobacco: Never Used   Substance and Sexual Activity    Alcohol use: No    Drug use: No    Sexual activity: Not on file       No Known Allergies      Current Outpatient Medications:     acetaminophen (TYLENOL) 325 mg tablet, Take 650 mg by mouth every 6 (six) hours as needed for mild pain, Disp: , Rfl:     ibuprofen (MOTRIN) 200 mg tablet, Take 200 mg by mouth every 6 (six) hours as needed, Disp: , Rfl:     ibuprofen (MOTRIN) 800 mg tablet, Take 1 tablet (800 mg total) by mouth every 8 (eight) hours as needed for mild pain, Disp: 40 tablet, Rfl: 1    naproxen (NAPROSYN) 500 mg tablet, Take 1 tablet (500 mg total) by mouth 2 (two) times a day as needed for mild pain (with meals) for up to 7 days, Disp: 14 tablet, Rfl: 0      Tammy Erazo    Scribe Attestation    I,:  Nixon Erazo am acting as a scribe while in the presence of the attending physician :       I,:  Tiana Lenz MD personally performed the services described in this documentation    as scribed in my presence :

## 2021-03-09 ENCOUNTER — EVALUATION (OUTPATIENT)
Dept: PHYSICAL THERAPY | Facility: CLINIC | Age: 46
End: 2021-03-09

## 2021-03-09 DIAGNOSIS — M17.12 PRIMARY OSTEOARTHRITIS OF LEFT KNEE: ICD-10-CM

## 2021-03-09 NOTE — PROGRESS NOTES
Pt arrived to clinic at 16:55 and was told her insurance benefits  She stated that the costs were too extensive at the moment  Private pay was also discussed and she decided to hold on PT for the moment but will call back when she is ready

## 2021-04-04 ENCOUNTER — HOSPITAL ENCOUNTER (INPATIENT)
Facility: HOSPITAL | Age: 46
LOS: 1 days | Discharge: HOME | DRG: 690 | End: 2021-04-06
Attending: EMERGENCY MEDICINE | Admitting: INTERNAL MEDICINE
Payer: COMMERCIAL

## 2021-04-04 ENCOUNTER — APPOINTMENT (EMERGENCY)
Dept: RADIOLOGY | Facility: HOSPITAL | Age: 46
DRG: 690 | End: 2021-04-04
Payer: COMMERCIAL

## 2021-04-04 ENCOUNTER — APPOINTMENT (EMERGENCY)
Dept: CT IMAGING | Facility: HOSPITAL | Age: 46
DRG: 690 | End: 2021-04-04
Payer: COMMERCIAL

## 2021-04-04 DIAGNOSIS — N17.9 AKI (ACUTE KIDNEY INJURY) (HCC): ICD-10-CM

## 2021-04-04 DIAGNOSIS — N12 PYELONEPHRITIS: ICD-10-CM

## 2021-04-04 DIAGNOSIS — D72.829 LEUKOCYTOSIS: ICD-10-CM

## 2021-04-04 DIAGNOSIS — B34.9 VIRAL SYNDROME: Primary | ICD-10-CM

## 2021-04-04 DIAGNOSIS — R10.9 RIGHT FLANK PAIN: ICD-10-CM

## 2021-04-04 PROBLEM — E66.01 CLASS 3 SEVERE OBESITY WITH BODY MASS INDEX (BMI) OF 40.0 TO 44.9 IN ADULT (HCC): Status: ACTIVE | Noted: 2021-04-04

## 2021-04-04 PROBLEM — Z72.0 TOBACCO USE: Status: ACTIVE | Noted: 2021-04-04

## 2021-04-04 PROBLEM — R06.2 WHEEZING: Status: ACTIVE | Noted: 2021-04-04

## 2021-04-04 PROBLEM — E66.813 CLASS 3 SEVERE OBESITY WITH BODY MASS INDEX (BMI) OF 40.0 TO 44.9 IN ADULT (HCC): Status: ACTIVE | Noted: 2021-04-04

## 2021-04-04 LAB
ALBUMIN SERPL BCP-MCNC: 2.8 G/DL (ref 3.5–5)
ALP SERPL-CCNC: 92 U/L (ref 46–116)
ALT SERPL W P-5'-P-CCNC: 40 U/L (ref 12–78)
ANION GAP SERPL CALCULATED.3IONS-SCNC: 12 MMOL/L (ref 4–13)
ANION GAP SERPL CALCULATED.3IONS-SCNC: 12 MMOL/L (ref 4–13)
AST SERPL W P-5'-P-CCNC: 27 U/L (ref 5–45)
BACTERIA UR QL AUTO: ABNORMAL /HPF
BASOPHILS # BLD AUTO: 0.02 THOUSANDS/ΜL (ref 0–0.1)
BASOPHILS # BLD MANUAL: 0 THOUSAND/UL (ref 0–0.1)
BASOPHILS NFR BLD AUTO: 0 % (ref 0–1)
BASOPHILS NFR MAR MANUAL: 0 % (ref 0–1)
BILIRUB SERPL-MCNC: 0.62 MG/DL (ref 0.2–1)
BILIRUB UR QL STRIP: ABNORMAL
BUN SERPL-MCNC: 18 MG/DL (ref 5–25)
BUN SERPL-MCNC: 21 MG/DL (ref 5–25)
CALCIUM ALBUM COR SERPL-MCNC: 9.8 MG/DL (ref 8.3–10.1)
CALCIUM SERPL-MCNC: 8.7 MG/DL (ref 8.3–10.1)
CALCIUM SERPL-MCNC: 8.8 MG/DL (ref 8.3–10.1)
CHLORIDE SERPL-SCNC: 97 MMOL/L (ref 100–108)
CHLORIDE SERPL-SCNC: 99 MMOL/L (ref 100–108)
CLARITY UR: ABNORMAL
CO2 SERPL-SCNC: 23 MMOL/L (ref 21–32)
CO2 SERPL-SCNC: 23 MMOL/L (ref 21–32)
COLOR UR: YELLOW
CREAT SERPL-MCNC: 1.31 MG/DL (ref 0.6–1.3)
CREAT SERPL-MCNC: 1.45 MG/DL (ref 0.6–1.3)
EOSINOPHIL # BLD AUTO: 0.01 THOUSAND/ΜL (ref 0–0.61)
EOSINOPHIL # BLD MANUAL: 0 THOUSAND/UL (ref 0–0.4)
EOSINOPHIL NFR BLD AUTO: 0 % (ref 0–6)
EOSINOPHIL NFR BLD MANUAL: 0 % (ref 0–6)
ERYTHROCYTE [DISTWIDTH] IN BLOOD BY AUTOMATED COUNT: 15.4 % (ref 11.6–15.1)
ERYTHROCYTE [DISTWIDTH] IN BLOOD BY AUTOMATED COUNT: 15.7 % (ref 11.6–15.1)
EXT PREG TEST URINE: NEGATIVE
EXT. CONTROL ED NAV: NORMAL
FLUAV RNA RESP QL NAA+PROBE: NEGATIVE
FLUBV RNA RESP QL NAA+PROBE: NEGATIVE
GFR SERPL CREATININE-BSD FRML MDRD: 43 ML/MIN/1.73SQ M
GFR SERPL CREATININE-BSD FRML MDRD: 49 ML/MIN/1.73SQ M
GLUCOSE P FAST SERPL-MCNC: 98 MG/DL (ref 65–99)
GLUCOSE SERPL-MCNC: 126 MG/DL (ref 65–140)
GLUCOSE SERPL-MCNC: 98 MG/DL (ref 65–140)
GLUCOSE UR STRIP-MCNC: NEGATIVE MG/DL
HCG SERPL QL: NEGATIVE
HCT VFR BLD AUTO: 37.7 % (ref 34.8–46.1)
HCT VFR BLD AUTO: 37.8 % (ref 34.8–46.1)
HGB BLD-MCNC: 12.2 G/DL (ref 11.5–15.4)
HGB BLD-MCNC: 12.6 G/DL (ref 11.5–15.4)
HGB UR QL STRIP.AUTO: ABNORMAL
HYALINE CASTS #/AREA URNS LPF: ABNORMAL /LPF
IMM GRANULOCYTES # BLD AUTO: 0.14 THOUSAND/UL (ref 0–0.2)
IMM GRANULOCYTES NFR BLD AUTO: 2 % (ref 0–2)
KETONES UR STRIP-MCNC: NEGATIVE MG/DL
LEUKOCYTE ESTERASE UR QL STRIP: ABNORMAL
LYMPHOCYTES # BLD AUTO: 0.46 THOUSANDS/ΜL (ref 0.6–4.47)
LYMPHOCYTES # BLD AUTO: 0.47 THOUSAND/UL (ref 0.6–4.47)
LYMPHOCYTES # BLD AUTO: 4 % (ref 14–44)
LYMPHOCYTES NFR BLD AUTO: 5 % (ref 14–44)
MCH RBC QN AUTO: 31.3 PG (ref 26.8–34.3)
MCH RBC QN AUTO: 31.5 PG (ref 26.8–34.3)
MCHC RBC AUTO-ENTMCNC: 32.4 G/DL (ref 31.4–37.4)
MCHC RBC AUTO-ENTMCNC: 33.3 G/DL (ref 31.4–37.4)
MCV RBC AUTO: 94 FL (ref 82–98)
MCV RBC AUTO: 97 FL (ref 82–98)
METAMYELOCYTES NFR BLD MANUAL: 1 % (ref 0–1)
MONOCYTES # BLD AUTO: 0.65 THOUSAND/ΜL (ref 0.17–1.22)
MONOCYTES # BLD AUTO: 0.81 THOUSAND/UL (ref 0–1.22)
MONOCYTES NFR BLD AUTO: 8 % (ref 4–12)
MONOCYTES NFR BLD: 7 % (ref 4–12)
NEUTROPHILS # BLD AUTO: 7.31 THOUSANDS/ΜL (ref 1.85–7.62)
NEUTROPHILS # BLD MANUAL: 10.24 THOUSAND/UL (ref 1.85–7.62)
NEUTS BAND NFR BLD MANUAL: 2 % (ref 0–8)
NEUTS SEG NFR BLD AUTO: 85 % (ref 43–75)
NEUTS SEG NFR BLD AUTO: 86 % (ref 43–75)
NITRITE UR QL STRIP: NEGATIVE
NON-SQ EPI CELLS URNS QL MICRO: ABNORMAL /HPF
NRBC BLD AUTO-RTO: 0 /100 WBCS
NRBC BLD AUTO-RTO: 0 /100 WBCS
PH UR STRIP.AUTO: 6 [PH] (ref 4.5–8)
PLATELET # BLD AUTO: 176 THOUSANDS/UL (ref 149–390)
PLATELET # BLD AUTO: 210 THOUSANDS/UL (ref 149–390)
PLATELET BLD QL SMEAR: ADEQUATE
PMV BLD AUTO: 9.8 FL (ref 8.9–12.7)
PMV BLD AUTO: 9.9 FL (ref 8.9–12.7)
POTASSIUM SERPL-SCNC: 3.6 MMOL/L (ref 3.5–5.3)
POTASSIUM SERPL-SCNC: 3.6 MMOL/L (ref 3.5–5.3)
PROT SERPL-MCNC: 7.6 G/DL (ref 6.4–8.2)
PROT UR STRIP-MCNC: ABNORMAL MG/DL
RBC # BLD AUTO: 3.87 MILLION/UL (ref 3.81–5.12)
RBC # BLD AUTO: 4.02 MILLION/UL (ref 3.81–5.12)
RBC #/AREA URNS AUTO: ABNORMAL /HPF
RSV RNA RESP QL NAA+PROBE: NEGATIVE
SARS-COV-2 RNA RESP QL NAA+PROBE: NEGATIVE
SODIUM SERPL-SCNC: 132 MMOL/L (ref 136–145)
SODIUM SERPL-SCNC: 134 MMOL/L (ref 136–145)
SP GR UR STRIP.AUTO: 1.02 (ref 1–1.03)
TOTAL CELLS COUNTED SPEC: 100
UROBILINOGEN UR QL STRIP.AUTO: 1 E.U./DL
WBC # BLD AUTO: 11.64 THOUSAND/UL (ref 4.31–10.16)
WBC # BLD AUTO: 8.59 THOUSAND/UL (ref 4.31–10.16)
WBC #/AREA URNS AUTO: ABNORMAL /HPF

## 2021-04-04 PROCEDURE — 36415 COLL VENOUS BLD VENIPUNCTURE: CPT | Performed by: EMERGENCY MEDICINE

## 2021-04-04 PROCEDURE — 99285 EMERGENCY DEPT VISIT HI MDM: CPT

## 2021-04-04 PROCEDURE — 87086 URINE CULTURE/COLONY COUNT: CPT

## 2021-04-04 PROCEDURE — 94760 N-INVAS EAR/PLS OXIMETRY 1: CPT

## 2021-04-04 PROCEDURE — 80053 COMPREHEN METABOLIC PANEL: CPT | Performed by: EMERGENCY MEDICINE

## 2021-04-04 PROCEDURE — 96361 HYDRATE IV INFUSION ADD-ON: CPT

## 2021-04-04 PROCEDURE — 74176 CT ABD & PELVIS W/O CONTRAST: CPT

## 2021-04-04 PROCEDURE — 85025 COMPLETE CBC W/AUTO DIFF WBC: CPT | Performed by: PHYSICIAN ASSISTANT

## 2021-04-04 PROCEDURE — 81001 URINALYSIS AUTO W/SCOPE: CPT

## 2021-04-04 PROCEDURE — 71045 X-RAY EXAM CHEST 1 VIEW: CPT

## 2021-04-04 PROCEDURE — 81025 URINE PREGNANCY TEST: CPT | Performed by: EMERGENCY MEDICINE

## 2021-04-04 PROCEDURE — 80048 BASIC METABOLIC PNL TOTAL CA: CPT | Performed by: PHYSICIAN ASSISTANT

## 2021-04-04 PROCEDURE — 87186 SC STD MICRODIL/AGAR DIL: CPT

## 2021-04-04 PROCEDURE — 99285 EMERGENCY DEPT VISIT HI MDM: CPT | Performed by: EMERGENCY MEDICINE

## 2021-04-04 PROCEDURE — 96375 TX/PRO/DX INJ NEW DRUG ADDON: CPT

## 2021-04-04 PROCEDURE — 85007 BL SMEAR W/DIFF WBC COUNT: CPT | Performed by: EMERGENCY MEDICINE

## 2021-04-04 PROCEDURE — 85027 COMPLETE CBC AUTOMATED: CPT | Performed by: EMERGENCY MEDICINE

## 2021-04-04 PROCEDURE — 94640 AIRWAY INHALATION TREATMENT: CPT

## 2021-04-04 PROCEDURE — 84703 CHORIONIC GONADOTROPIN ASSAY: CPT | Performed by: EMERGENCY MEDICINE

## 2021-04-04 PROCEDURE — 87077 CULTURE AEROBIC IDENTIFY: CPT

## 2021-04-04 PROCEDURE — 99220 PR INITIAL OBSERVATION CARE/DAY 70 MINUTES: CPT | Performed by: INTERNAL MEDICINE

## 2021-04-04 PROCEDURE — 96374 THER/PROPH/DIAG INJ IV PUSH: CPT

## 2021-04-04 PROCEDURE — 0241U HB NFCT DS VIR RESP RNA 4 TRGT: CPT | Performed by: EMERGENCY MEDICINE

## 2021-04-04 RX ORDER — SODIUM CHLORIDE, SODIUM LACTATE, POTASSIUM CHLORIDE, CALCIUM CHLORIDE 600; 310; 30; 20 MG/100ML; MG/100ML; MG/100ML; MG/100ML
125 INJECTION, SOLUTION INTRAVENOUS CONTINUOUS
Status: DISCONTINUED | OUTPATIENT
Start: 2021-04-04 | End: 2021-04-05

## 2021-04-04 RX ORDER — SODIUM CHLORIDE 9 MG/ML
125 INJECTION, SOLUTION INTRAVENOUS CONTINUOUS
Status: CANCELLED | OUTPATIENT
Start: 2021-04-04

## 2021-04-04 RX ORDER — IPRATROPIUM BROMIDE AND ALBUTEROL SULFATE 2.5; .5 MG/3ML; MG/3ML
3 SOLUTION RESPIRATORY (INHALATION)
Status: DISCONTINUED | OUTPATIENT
Start: 2021-04-04 | End: 2021-04-06 | Stop reason: HOSPADM

## 2021-04-04 RX ORDER — HYDROCODONE POLISTIREX AND CHLORPHENIRAMINE POLISTIREX 10; 8 MG/5ML; MG/5ML
5 SUSPENSION, EXTENDED RELEASE ORAL ONCE
Status: COMPLETED | OUTPATIENT
Start: 2021-04-04 | End: 2021-04-04

## 2021-04-04 RX ORDER — NICOTINE 21 MG/24HR
1 PATCH, TRANSDERMAL 24 HOURS TRANSDERMAL DAILY
Status: DISCONTINUED | OUTPATIENT
Start: 2021-04-04 | End: 2021-04-06 | Stop reason: HOSPADM

## 2021-04-04 RX ORDER — KETOROLAC TROMETHAMINE 30 MG/ML
30 INJECTION, SOLUTION INTRAMUSCULAR; INTRAVENOUS ONCE
Status: COMPLETED | OUTPATIENT
Start: 2021-04-04 | End: 2021-04-04

## 2021-04-04 RX ORDER — ACETAMINOPHEN 325 MG/1
650 TABLET ORAL EVERY 4 HOURS PRN
Status: DISCONTINUED | OUTPATIENT
Start: 2021-04-04 | End: 2021-04-06 | Stop reason: HOSPADM

## 2021-04-04 RX ORDER — MAGNESIUM HYDROXIDE/ALUMINUM HYDROXICE/SIMETHICONE 120; 1200; 1200 MG/30ML; MG/30ML; MG/30ML
30 SUSPENSION ORAL EVERY 6 HOURS PRN
Status: DISCONTINUED | OUTPATIENT
Start: 2021-04-04 | End: 2021-04-06 | Stop reason: HOSPADM

## 2021-04-04 RX ORDER — ACETAMINOPHEN 325 MG/1
650 TABLET ORAL EVERY 6 HOURS PRN
Status: DISCONTINUED | OUTPATIENT
Start: 2021-04-04 | End: 2021-04-04

## 2021-04-04 RX ORDER — ONDANSETRON 2 MG/ML
4 INJECTION INTRAMUSCULAR; INTRAVENOUS ONCE
Status: COMPLETED | OUTPATIENT
Start: 2021-04-04 | End: 2021-04-04

## 2021-04-04 RX ORDER — HEPARIN SODIUM 5000 [USP'U]/ML
5000 INJECTION, SOLUTION INTRAVENOUS; SUBCUTANEOUS EVERY 8 HOURS SCHEDULED
Status: DISCONTINUED | OUTPATIENT
Start: 2021-04-04 | End: 2021-04-06 | Stop reason: HOSPADM

## 2021-04-04 RX ORDER — MORPHINE SULFATE 4 MG/ML
4 INJECTION, SOLUTION INTRAMUSCULAR; INTRAVENOUS
Status: DISCONTINUED | OUTPATIENT
Start: 2021-04-04 | End: 2021-04-06 | Stop reason: HOSPADM

## 2021-04-04 RX ORDER — MORPHINE SULFATE 4 MG/ML
4 INJECTION, SOLUTION INTRAMUSCULAR; INTRAVENOUS ONCE
Status: COMPLETED | OUTPATIENT
Start: 2021-04-04 | End: 2021-04-04

## 2021-04-04 RX ORDER — SODIUM CHLORIDE 9 MG/ML
125 INJECTION, SOLUTION INTRAVENOUS CONTINUOUS
Status: DISCONTINUED | OUTPATIENT
Start: 2021-04-04 | End: 2021-04-04

## 2021-04-04 RX ORDER — ONDANSETRON 2 MG/ML
4 INJECTION INTRAMUSCULAR; INTRAVENOUS EVERY 6 HOURS PRN
Status: DISCONTINUED | OUTPATIENT
Start: 2021-04-04 | End: 2021-04-06 | Stop reason: HOSPADM

## 2021-04-04 RX ADMIN — MORPHINE SULFATE 4 MG: 4 INJECTION INTRAVENOUS at 04:18

## 2021-04-04 RX ADMIN — MORPHINE SULFATE 4 MG: 4 INJECTION INTRAVENOUS at 23:26

## 2021-04-04 RX ADMIN — SODIUM CHLORIDE, SODIUM LACTATE, POTASSIUM CHLORIDE, AND CALCIUM CHLORIDE 125 ML/HR: .6; .31; .03; .02 INJECTION, SOLUTION INTRAVENOUS at 17:12

## 2021-04-04 RX ADMIN — MORPHINE SULFATE 4 MG: 4 INJECTION INTRAVENOUS at 19:07

## 2021-04-04 RX ADMIN — ONDANSETRON 4 MG: 2 INJECTION INTRAMUSCULAR; INTRAVENOUS at 12:52

## 2021-04-04 RX ADMIN — SODIUM CHLORIDE, SODIUM LACTATE, POTASSIUM CHLORIDE, AND CALCIUM CHLORIDE 125 ML/HR: .6; .31; .03; .02 INJECTION, SOLUTION INTRAVENOUS at 06:55

## 2021-04-04 RX ADMIN — HYDROCODONE POLISTIREX AND CHLORPHENIRAMINE POLISTIREX 5 ML: 10; 8 SUSPENSION, EXTENDED RELEASE ORAL at 01:04

## 2021-04-04 RX ADMIN — IPRATROPIUM BROMIDE AND ALBUTEROL SULFATE 3 ML: 2.5; .5 SOLUTION RESPIRATORY (INHALATION) at 13:37

## 2021-04-04 RX ADMIN — HEPARIN SODIUM 5000 UNITS: 5000 INJECTION INTRAVENOUS; SUBCUTANEOUS at 06:24

## 2021-04-04 RX ADMIN — HEPARIN SODIUM 5000 UNITS: 5000 INJECTION INTRAVENOUS; SUBCUTANEOUS at 15:09

## 2021-04-04 RX ADMIN — MORPHINE SULFATE 2 MG: 2 INJECTION, SOLUTION INTRAMUSCULAR; INTRAVENOUS at 15:04

## 2021-04-04 RX ADMIN — HEPARIN SODIUM 5000 UNITS: 5000 INJECTION INTRAVENOUS; SUBCUTANEOUS at 21:38

## 2021-04-04 RX ADMIN — ACETAMINOPHEN 650 MG: 325 TABLET, FILM COATED ORAL at 06:22

## 2021-04-04 RX ADMIN — CEFTRIAXONE SODIUM 1000 MG: 10 INJECTION, POWDER, FOR SOLUTION INTRAVENOUS at 04:21

## 2021-04-04 RX ADMIN — IPRATROPIUM BROMIDE AND ALBUTEROL SULFATE 3 ML: 2.5; .5 SOLUTION RESPIRATORY (INHALATION) at 07:50

## 2021-04-04 RX ADMIN — ONDANSETRON 4 MG: 2 INJECTION INTRAMUSCULAR; INTRAVENOUS at 01:03

## 2021-04-04 RX ADMIN — SODIUM CHLORIDE 1000 ML: 0.9 INJECTION, SOLUTION INTRAVENOUS at 01:01

## 2021-04-04 RX ADMIN — ONDANSETRON 4 MG: 2 INJECTION INTRAMUSCULAR; INTRAVENOUS at 17:10

## 2021-04-04 RX ADMIN — ACETAMINOPHEN 650 MG: 325 TABLET, FILM COATED ORAL at 17:10

## 2021-04-04 RX ADMIN — SODIUM CHLORIDE 125 ML/HR: 0.9 INJECTION, SOLUTION INTRAVENOUS at 04:17

## 2021-04-04 RX ADMIN — ACETAMINOPHEN 650 MG: 325 TABLET, FILM COATED ORAL at 11:28

## 2021-04-04 RX ADMIN — KETOROLAC TROMETHAMINE 30 MG: 30 INJECTION, SOLUTION INTRAMUSCULAR at 01:14

## 2021-04-04 RX ADMIN — MORPHINE SULFATE 2 MG: 2 INJECTION, SOLUTION INTRAMUSCULAR; INTRAVENOUS at 06:23

## 2021-04-04 RX ADMIN — ACETAMINOPHEN 650 MG: 325 TABLET, FILM COATED ORAL at 22:56

## 2021-04-05 PROBLEM — A41.9 SEPSIS (HCC): Status: ACTIVE | Noted: 2021-04-05

## 2021-04-05 LAB
ANION GAP SERPL CALCULATED.3IONS-SCNC: 9 MMOL/L (ref 4–13)
BASOPHILS # BLD AUTO: 0.01 THOUSANDS/ΜL (ref 0–0.1)
BASOPHILS NFR BLD AUTO: 0 % (ref 0–1)
BUN SERPL-MCNC: 14 MG/DL (ref 5–25)
CALCIUM SERPL-MCNC: 8.4 MG/DL (ref 8.3–10.1)
CHLORIDE SERPL-SCNC: 99 MMOL/L (ref 100–108)
CO2 SERPL-SCNC: 27 MMOL/L (ref 21–32)
CREAT SERPL-MCNC: 1.18 MG/DL (ref 0.6–1.3)
EOSINOPHIL # BLD AUTO: 0.05 THOUSAND/ΜL (ref 0–0.61)
EOSINOPHIL NFR BLD AUTO: 1 % (ref 0–6)
ERYTHROCYTE [DISTWIDTH] IN BLOOD BY AUTOMATED COUNT: 16 % (ref 11.6–15.1)
GFR SERPL CREATININE-BSD FRML MDRD: 55 ML/MIN/1.73SQ M
GLUCOSE P FAST SERPL-MCNC: 136 MG/DL (ref 65–99)
GLUCOSE SERPL-MCNC: 136 MG/DL (ref 65–140)
HCT VFR BLD AUTO: 36.2 % (ref 34.8–46.1)
HGB BLD-MCNC: 11.9 G/DL (ref 11.5–15.4)
IMM GRANULOCYTES # BLD AUTO: 0.08 THOUSAND/UL (ref 0–0.2)
IMM GRANULOCYTES NFR BLD AUTO: 1 % (ref 0–2)
LYMPHOCYTES # BLD AUTO: 0.64 THOUSANDS/ΜL (ref 0.6–4.47)
LYMPHOCYTES NFR BLD AUTO: 10 % (ref 14–44)
MCH RBC QN AUTO: 31.6 PG (ref 26.8–34.3)
MCHC RBC AUTO-ENTMCNC: 32.9 G/DL (ref 31.4–37.4)
MCV RBC AUTO: 96 FL (ref 82–98)
MONOCYTES # BLD AUTO: 0.6 THOUSAND/ΜL (ref 0.17–1.22)
MONOCYTES NFR BLD AUTO: 10 % (ref 4–12)
NEUTROPHILS # BLD AUTO: 4.93 THOUSANDS/ΜL (ref 1.85–7.62)
NEUTS SEG NFR BLD AUTO: 78 % (ref 43–75)
NRBC BLD AUTO-RTO: 0 /100 WBCS
PLATELET # BLD AUTO: 207 THOUSANDS/UL (ref 149–390)
PMV BLD AUTO: 10.4 FL (ref 8.9–12.7)
POTASSIUM SERPL-SCNC: 3.1 MMOL/L (ref 3.5–5.3)
RBC # BLD AUTO: 3.76 MILLION/UL (ref 3.81–5.12)
SODIUM SERPL-SCNC: 135 MMOL/L (ref 136–145)
WBC # BLD AUTO: 6.31 THOUSAND/UL (ref 4.31–10.16)

## 2021-04-05 PROCEDURE — 94640 AIRWAY INHALATION TREATMENT: CPT

## 2021-04-05 PROCEDURE — 80048 BASIC METABOLIC PNL TOTAL CA: CPT | Performed by: INTERNAL MEDICINE

## 2021-04-05 PROCEDURE — 99232 SBSQ HOSP IP/OBS MODERATE 35: CPT | Performed by: INTERNAL MEDICINE

## 2021-04-05 PROCEDURE — 85025 COMPLETE CBC W/AUTO DIFF WBC: CPT | Performed by: INTERNAL MEDICINE

## 2021-04-05 PROCEDURE — 94760 N-INVAS EAR/PLS OXIMETRY 1: CPT

## 2021-04-05 RX ORDER — POTASSIUM CHLORIDE 20 MEQ/1
40 TABLET, EXTENDED RELEASE ORAL ONCE
Status: COMPLETED | OUTPATIENT
Start: 2021-04-05 | End: 2021-04-05

## 2021-04-05 RX ORDER — SODIUM CHLORIDE 9 MG/ML
100 INJECTION, SOLUTION INTRAVENOUS CONTINUOUS
Status: DISCONTINUED | OUTPATIENT
Start: 2021-04-05 | End: 2021-04-06 | Stop reason: HOSPADM

## 2021-04-05 RX ADMIN — IPRATROPIUM BROMIDE AND ALBUTEROL SULFATE 3 ML: 2.5; .5 SOLUTION RESPIRATORY (INHALATION) at 14:12

## 2021-04-05 RX ADMIN — ONDANSETRON 4 MG: 2 INJECTION INTRAMUSCULAR; INTRAVENOUS at 08:00

## 2021-04-05 RX ADMIN — POTASSIUM CHLORIDE 40 MEQ: 1500 TABLET, EXTENDED RELEASE ORAL at 11:08

## 2021-04-05 RX ADMIN — MORPHINE SULFATE 4 MG: 4 INJECTION INTRAVENOUS at 13:39

## 2021-04-05 RX ADMIN — IPRATROPIUM BROMIDE AND ALBUTEROL SULFATE 3 ML: 2.5; .5 SOLUTION RESPIRATORY (INHALATION) at 01:14

## 2021-04-05 RX ADMIN — SODIUM CHLORIDE, SODIUM LACTATE, POTASSIUM CHLORIDE, AND CALCIUM CHLORIDE 125 ML/HR: .6; .31; .03; .02 INJECTION, SOLUTION INTRAVENOUS at 04:14

## 2021-04-05 RX ADMIN — ONDANSETRON 4 MG: 2 INJECTION INTRAMUSCULAR; INTRAVENOUS at 17:49

## 2021-04-05 RX ADMIN — MORPHINE SULFATE 4 MG: 4 INJECTION INTRAVENOUS at 11:08

## 2021-04-05 RX ADMIN — IPRATROPIUM BROMIDE AND ALBUTEROL SULFATE 3 ML: 2.5; .5 SOLUTION RESPIRATORY (INHALATION) at 20:04

## 2021-04-05 RX ADMIN — MORPHINE SULFATE 4 MG: 4 INJECTION INTRAVENOUS at 08:00

## 2021-04-05 RX ADMIN — IPRATROPIUM BROMIDE AND ALBUTEROL SULFATE 3 ML: 2.5; .5 SOLUTION RESPIRATORY (INHALATION) at 07:26

## 2021-04-05 RX ADMIN — ACETAMINOPHEN 650 MG: 325 TABLET, FILM COATED ORAL at 20:07

## 2021-04-05 RX ADMIN — SODIUM CHLORIDE 100 ML/HR: 0.9 INJECTION, SOLUTION INTRAVENOUS at 22:19

## 2021-04-05 RX ADMIN — SODIUM CHLORIDE 100 ML/HR: 0.9 INJECTION, SOLUTION INTRAVENOUS at 13:32

## 2021-04-05 RX ADMIN — HEPARIN SODIUM 5000 UNITS: 5000 INJECTION INTRAVENOUS; SUBCUTANEOUS at 22:18

## 2021-04-05 RX ADMIN — MORPHINE SULFATE 4 MG: 4 INJECTION INTRAVENOUS at 17:44

## 2021-04-05 RX ADMIN — HEPARIN SODIUM 5000 UNITS: 5000 INJECTION INTRAVENOUS; SUBCUTANEOUS at 13:29

## 2021-04-05 RX ADMIN — CEFTRIAXONE SODIUM 1000 MG: 10 INJECTION, POWDER, FOR SOLUTION INTRAVENOUS at 04:14

## 2021-04-05 RX ADMIN — HEPARIN SODIUM 5000 UNITS: 5000 INJECTION INTRAVENOUS; SUBCUTANEOUS at 06:08

## 2021-04-05 RX ADMIN — MORPHINE SULFATE 4 MG: 4 INJECTION INTRAVENOUS at 20:56

## 2021-04-05 RX ADMIN — MORPHINE SULFATE 4 MG: 4 INJECTION INTRAVENOUS at 04:18

## 2021-04-06 VITALS
SYSTOLIC BLOOD PRESSURE: 142 MMHG | DIASTOLIC BLOOD PRESSURE: 64 MMHG | RESPIRATION RATE: 20 BRPM | OXYGEN SATURATION: 96 % | HEART RATE: 84 BPM | TEMPERATURE: 97.2 F

## 2021-04-06 PROBLEM — N17.9 AKI (ACUTE KIDNEY INJURY) (HCC): Status: RESOLVED | Noted: 2021-04-04 | Resolved: 2021-04-06

## 2021-04-06 LAB
ANION GAP SERPL CALCULATED.3IONS-SCNC: 9 MMOL/L (ref 4–13)
BACTERIA UR CULT: ABNORMAL
BACTERIA UR CULT: ABNORMAL
BUN SERPL-MCNC: 10 MG/DL (ref 5–25)
CALCIUM SERPL-MCNC: 8.4 MG/DL (ref 8.3–10.1)
CHLORIDE SERPL-SCNC: 103 MMOL/L (ref 100–108)
CO2 SERPL-SCNC: 25 MMOL/L (ref 21–32)
CREAT SERPL-MCNC: 1.08 MG/DL (ref 0.6–1.3)
GFR SERPL CREATININE-BSD FRML MDRD: 61 ML/MIN/1.73SQ M
GLUCOSE SERPL-MCNC: 105 MG/DL (ref 65–140)
MAGNESIUM SERPL-MCNC: 2.4 MG/DL (ref 1.6–2.6)
POTASSIUM SERPL-SCNC: 3.5 MMOL/L (ref 3.5–5.3)
SODIUM SERPL-SCNC: 137 MMOL/L (ref 136–145)

## 2021-04-06 PROCEDURE — 94640 AIRWAY INHALATION TREATMENT: CPT

## 2021-04-06 PROCEDURE — 80048 BASIC METABOLIC PNL TOTAL CA: CPT | Performed by: PHYSICIAN ASSISTANT

## 2021-04-06 PROCEDURE — 99239 HOSP IP/OBS DSCHRG MGMT >30: CPT | Performed by: INTERNAL MEDICINE

## 2021-04-06 PROCEDURE — 83735 ASSAY OF MAGNESIUM: CPT | Performed by: PHYSICIAN ASSISTANT

## 2021-04-06 PROCEDURE — 94760 N-INVAS EAR/PLS OXIMETRY 1: CPT

## 2021-04-06 RX ORDER — CEPHALEXIN 500 MG/1
500 CAPSULE ORAL EVERY 6 HOURS SCHEDULED
Qty: 28 CAPSULE | Refills: 0 | Status: SHIPPED | OUTPATIENT
Start: 2021-04-07 | End: 2021-04-14

## 2021-04-06 RX ADMIN — SODIUM CHLORIDE 100 ML/HR: 0.9 INJECTION, SOLUTION INTRAVENOUS at 05:43

## 2021-04-06 RX ADMIN — CEFTRIAXONE SODIUM 1000 MG: 10 INJECTION, POWDER, FOR SOLUTION INTRAVENOUS at 04:41

## 2021-04-06 RX ADMIN — HEPARIN SODIUM 5000 UNITS: 5000 INJECTION INTRAVENOUS; SUBCUTANEOUS at 05:43

## 2021-04-06 RX ADMIN — MORPHINE SULFATE 4 MG: 4 INJECTION INTRAVENOUS at 08:40

## 2021-04-06 RX ADMIN — ACETAMINOPHEN 650 MG: 325 TABLET, FILM COATED ORAL at 11:14

## 2021-04-06 RX ADMIN — IPRATROPIUM BROMIDE AND ALBUTEROL SULFATE 3 ML: 2.5; .5 SOLUTION RESPIRATORY (INHALATION) at 08:41

## 2021-04-06 RX ADMIN — MORPHINE SULFATE 4 MG: 4 INJECTION INTRAVENOUS at 12:53

## 2021-04-06 RX ADMIN — ACETAMINOPHEN 650 MG: 325 TABLET, FILM COATED ORAL at 05:49

## 2021-04-06 RX ADMIN — MORPHINE SULFATE 4 MG: 4 INJECTION INTRAVENOUS at 04:37

## 2021-08-05 ENCOUNTER — TELEPHONE (OUTPATIENT)
Dept: PAIN MEDICINE | Facility: MEDICAL CENTER | Age: 46
End: 2021-08-05

## 2021-08-05 ENCOUNTER — CONSULT (OUTPATIENT)
Dept: PAIN MEDICINE | Facility: MEDICAL CENTER | Age: 46
End: 2021-08-05
Payer: COMMERCIAL

## 2021-08-05 ENCOUNTER — APPOINTMENT (OUTPATIENT)
Dept: RADIOLOGY | Facility: MEDICAL CENTER | Age: 46
End: 2021-08-05
Payer: COMMERCIAL

## 2021-08-05 ENCOUNTER — TELEPHONE (OUTPATIENT)
Dept: RADIOLOGY | Facility: MEDICAL CENTER | Age: 46
End: 2021-08-05

## 2021-08-05 VITALS
HEIGHT: 67 IN | WEIGHT: 292 LBS | TEMPERATURE: 98.4 F | SYSTOLIC BLOOD PRESSURE: 156 MMHG | DIASTOLIC BLOOD PRESSURE: 90 MMHG | HEART RATE: 110 BPM | BODY MASS INDEX: 45.83 KG/M2

## 2021-08-05 DIAGNOSIS — G89.4 CHRONIC PAIN SYNDROME: ICD-10-CM

## 2021-08-05 DIAGNOSIS — M54.50 CHRONIC BILATERAL LOW BACK PAIN WITHOUT SCIATICA: Primary | ICD-10-CM

## 2021-08-05 DIAGNOSIS — M47.816 LUMBAR SPONDYLOSIS: ICD-10-CM

## 2021-08-05 DIAGNOSIS — G89.29 CHRONIC BILATERAL LOW BACK PAIN WITHOUT SCIATICA: Primary | ICD-10-CM

## 2021-08-05 DIAGNOSIS — G89.29 CHRONIC BILATERAL LOW BACK PAIN WITHOUT SCIATICA: ICD-10-CM

## 2021-08-05 DIAGNOSIS — M54.50 CHRONIC BILATERAL LOW BACK PAIN WITHOUT SCIATICA: ICD-10-CM

## 2021-08-05 PROCEDURE — 72114 X-RAY EXAM L-S SPINE BENDING: CPT

## 2021-08-05 PROCEDURE — 99204 OFFICE O/P NEW MOD 45 MIN: CPT | Performed by: PHYSICAL MEDICINE & REHABILITATION

## 2021-08-05 NOTE — PATIENT INSTRUCTIONS
Arthritis   WHAT YOU NEED TO KNOW:   Arthritis is pain or disease in one or more joints  There are many types of arthritis  Types such as rheumatoid arthritis cause inflammation in the joints  Other types wear away the cartilage between joints, such as osteoarthritis  This makes the bones of the joint rub together when you move the joint  An infection from bacteria, a virus, or a fungus can also cause arthritis  Your symptoms may be constant, or symptoms may come and go  Arthritis often gets worse over time and can cause permanent joint damage  DISCHARGE INSTRUCTIONS:   Call your doctor or rheumatologist if:   · You have a fever and severe joint pain or swelling  · You cannot move the affected joint  · You have severe joint pain you cannot tolerate  · You have a new or worsening rash  · Your pain or swelling does not get better with treatment  · You have questions or concerns about your condition or care  Medicines:   · Acetaminophen  decreases pain and fever  It is available without a doctor's order  Ask how much to take and how often to take it  Follow directions  Read the labels of all other medicines you are using to see if they also contain acetaminophen, or ask your doctor or pharmacist  Acetaminophen can cause liver damage if not taken correctly  Do not use more than 4 grams (4,000 milligrams) total of acetaminophen in one day  · NSAIDs , such as ibuprofen, help decrease swelling, pain, and fever  This medicine is available with or without a doctor's order  NSAIDs can cause stomach bleeding or kidney problems in certain people  If you take blood thinner medicine, always ask your healthcare provider if NSAIDs are safe for you  Always read the medicine label and follow directions  · Steroids  reduce swelling and pain  · Prescription pain medicine  may be given  Ask your healthcare provider how to take this medicine safely  Some prescription pain medicines contain acetaminophen   Do not take other medicines that contain acetaminophen without talking to your healthcare provider  Too much acetaminophen may cause liver damage  Prescription pain medicine may cause constipation  Ask your healthcare provider how to prevent or treat constipation  · Take your medicine as directed  Contact your healthcare provider if you think your medicine is not helping or if you have side effects  Tell him of her if you are allergic to any medicine  Keep a list of the medicines, vitamins, and herbs you take  Include the amounts, and when and why you take them  Bring the list or the pill bottles to follow-up visits  Carry your medicine list with you in case of an emergency  Manage your symptoms:   · Rest your painful joint so it can heal   Your healthcare provider may recommend crutches or a walker if the affected joint is in a leg  · Apply ice or heat to the joint  Both can help decrease swelling and pain  Ice may also help prevent tissue damage  Use an ice pack, or put crushed ice in a plastic bag  Cover it with a towel and place it on your joint for 15 to 20 minutes every hour or as directed  You can apply heat for 20 minutes every 2 hours  Heat treatment includes hot packs or heat lamps  · Elevate your joint  Elevation helps reduce swelling and pain  Raise your joint above the level of your heart as often as you can  Prop your painful joint on pillows to keep it above your heart comfortably  Manage arthritis:   · Talk to your healthcare providers about your arthritis medicines  Some medicines may only be needed when you have arthritis pain  You may need to take other medicines every day to prevent arthritis from getting worse  Your healthcare providers will help you understand all your medicines and when to take them  It is important to take the medicines as directed, even if you start to feel better  You can continue to have joint damage and inflammation even if you do not feel it      · Eat a variety of healthy foods  Healthy foods include fruits, vegetables, whole-grain breads, low-fat dairy products, beans, lean meats, and fish  Ask if you need to be on a special diet  A diet rich in calcium and vitamin D may decrease your risk of osteoporosis  Foods high in calcium include milk, cheese, broccoli, and tofu  Vitamin D may be found in meat, fish, fortified milk, cereal and bread  Ask if you need calcium or vitamin D supplements  · Go to physical or occupational therapy as directed  A physical therapist can teach you exercises to improve flexibility and range of motion  You may also be shown non-weight-bearing exercises that are safe for your joints, such as swimming  Exercise can help keep your joints flexible and reduce pain  An occupational therapist can help you learn to do your daily activities when your joints are stiff or sore  · Maintain a healthy weight  Extra weight puts increased pressure on your joints  Ask your healthcare provider what you should weigh  If you need to lose weight, he or she can help you create a weight loss program  Weight loss can help reduce pain and increase your ability to do your activities  · Wear flat or low-heeled shoes  This will help decrease pain and reduce pressure on your ankle, knee, and hip joints  · Do not smoke  Nicotine and other chemicals in cigarettes and cigars can damage your bones and joints  Ask your healthcare provider for information if you currently smoke and need help to quit  E-cigarettes or smokeless tobacco still contain nicotine  Talk to your healthcare provider before you use these products  Support devices:   · Orthotic shoes or insoles  help support your feet when you walk  · Crutches, a cane, or a walker  may help decrease your risk for falling  They also decrease stress on affected joints  · Devices to prevent falls  include raised toilet seats and bathtub bars to help you get up from sitting   Handrails can be placed in areas where you need balance and support  · Devices to help with support and rest  include splints to wear on your hands and a firm pillow while you sleep  Use a pillow that is firm enough to support your neck and head  Follow up with your healthcare provider or rheumatologist as directed:  Write down your questions so you remember to ask them during your visits  © Copyright [employer] 2021 Information is for End User's use only and may not be sold, redistributed or otherwise used for commercial purposes  All illustrations and images included in CareNotes® are the copyrighted property of [employer]  or [address] [place], [place] [address]mike   The above information is an  only  It is not intended as medical advice for individual conditions or treatments  Talk to your doctor, nurse or pharmacist before following any medical regimen to see if it is safe and effective for you

## 2021-08-05 NOTE — TELEPHONE ENCOUNTER
----- Message from Arie Rios DO sent at 8/5/2021  3:53 PM EDT -----  Moderate disc space narrowing at L4-5  Moderate to severe disc space narrowing at L5-S1  Advanced facet degenerative changes bilaterally at L4-5 and L5-S1  These are arthritic type changes as expected  Plan per my note

## 2021-08-05 NOTE — PROGRESS NOTES
Assessment  1  Chronic bilateral low back pain without sciatica    2  Lumbar spondylosis    3  Chronic pain syndrome        Plan  1  X-ray lumbar spine   2  We will schedule the patient for  Bilateral L3-5 medial branch nerve blocks with intention of moving forward towards radiofrequency ablation if there is an appropriate diagnostic response  The initial blocks will be performed with 2% lidocaine and if an appropriate response is obtained upon review of the patient's pain diary, a confirmatory block will be scheduled with 0 5% bupivacaine  In the office today, we reviewed the nature of facet joint pathology in depth using a spine model  We discussed the approach we would use for the injections and provided literature for home review  The patient understands the risks associated with the procedure including bleeding, infection, tissue injury, and allergic reaction and provided verbal informed consent in the office today  3  If no significant improvement would obtain lumbar spine MRI  4  Recommendation utilize acetaminophen 1000 mg every 8 hours   5  Patient may also trial topical Voltaren gel  My impressions and treatment recommendations were discussed in detail with the patient who verbalized understanding and had no further questions  Discharge instructions were provided  I personally saw and examined the patient and I agree with the above discussed plan of care  Orders Placed This Encounter   Procedures    X-ray lumbar spine complete with bending     Standing Status:   Future     Standing Expiration Date:   8/5/2025     Scheduling Instructions:      Bring along any outside films relating to this procedure  Order Specific Question:   Is the patient pregnant?      Answer:   No    FL spine and pain procedure     Standing Status:   Future     Standing Expiration Date:   8/5/2022     Order Specific Question:   Reason for Exam:     Answer:   (B) L3-5 MBB#1     Order Specific Question: Anticoagulant hold needed? Answer:   no     New Medications Ordered This Visit   Medications    ibuprofen (MOTRIN) 100 mg/5 mL suspension     Sig: Take by mouth every 6 (six) hours as needed for mild pain       History of Present Illness    Jacob Gabriel is a 50 y o  female   Seen in consultation as a self-referral regarding chronic lower back pain complaints  Patient has been experiencing these symptoms for over 20 years and relates this to heavy lifting throughout her life with a disabled child as well as working at [address] [employer] [address] for 6 years  She describes severe pain rated as an 810/10 which is constant without any typical pattern  She characterizes the pain as burning, shooting, numbness, sharp  She does describe lower extremity weakness but has not had any falls  She denies the use of an assistive device at this time  Aggravating factors include standing, bending, sitting, walking, exercise  Alleviating factors are unknown  She did see josé luis Brizuela in the past for rehab and pain management  She states that she was given a diagnosis of fibromyalgia  She has tried local heat or ice application and a 06D unit without benefit  She states that she cannot afford physical therapy  She does continue to smoke half pack per day  Denies marijuana and alcohol use  Currently using acetaminophen and occasionally ibuprofen however she prefers to avoid NSAIDs  I have personally reviewed and/or updated the patient's past medical history, past surgical history, family history, social history, current medications, allergies, and vital signs today  Review of Systems   Constitutional: Positive for unexpected weight change  Negative for fever  HENT: Negative for trouble swallowing  Eyes: Negative for visual disturbance  Respiratory: Positive for shortness of breath and wheezing  Cardiovascular: Positive for leg swelling  Negative for chest pain and palpitations     Gastrointestinal: Negative for constipation, diarrhea, nausea and vomiting  Endocrine: Positive for polydipsia  Negative for cold intolerance and heat intolerance  Genitourinary: Negative for difficulty urinating and frequency  Musculoskeletal: Positive for back pain, gait problem, joint swelling and myalgias  Negative for arthralgias  Skin: Negative for rash  Neurological: Positive for numbness and headaches  Negative for dizziness, seizures, syncope and weakness  Hematological: Does not bruise/bleed easily  Psychiatric/Behavioral: Positive for decreased concentration and sleep disturbance  Negative for dysphoric mood  All other systems reviewed and are negative        Patient Active Problem List   Diagnosis    Tear of medial meniscus of left knee, current    Primary osteoarthritis of left knee    Pyelonephritis    Wheezing    Class 3 severe obesity with body mass index (BMI) of 40 0 to 44 9 in adult ([facility] )    Tobacco use    Sepsis ([facility] )       Past Medical History:   Diagnosis Date    Arthritis     Chronic pain     Fibromyalgia, primary     Hiatal hernia     History of transfusion     Years ago    Hypertension        Past Surgical History:   Procedure Laterality Date     SECTION      CHOLECYSTECTOMY      HERNIA REPAIR      KIDNEY SURGERY      LIVER SURGERY         Family History   Problem Relation Age of Onset    Cancer Mother     Diabetes Father     Heart disease Father     Cancer Father        Social History     Occupational History    Not on file   Tobacco Use    Smoking status: Current Every Day Smoker     Packs/day: 1 00     Types: Cigarettes    Smokeless tobacco: Never Used   Vaping Use    Vaping Use: Never used   Substance and Sexual Activity    Alcohol use: No    Drug use: No    Sexual activity: Not on file       Current Outpatient Medications on File Prior to Visit   Medication Sig    acetaminophen (TYLENOL) 325 mg tablet Take 650 mg by mouth every 6 (six) hours as needed for mild pain    ibuprofen (MOTRIN) 100 mg/5 mL suspension Take by mouth every 6 (six) hours as needed for mild pain     No current facility-administered medications on file prior to visit  No Known Allergies    Physical Exam    /90   Pulse (!) 110   Temp 98 4 °F (36 9 °C)   Ht 5' 7" (1 702 m)   Wt 132 kg (292 lb)   BMI 45 73 kg/m²     Constitutional: normal, well developed, well nourished, alert, in moderate distress and non-toxic and no overt pain behavior    Eyes: anicteric  HEENT: grossly intact  Neck:  symmetric, trachea midline and no masses   Pulmonary:even and unlabored  Cardiovascular:No edema or pitting edema present  Skin:Normal without rashes or lesions and well hydrated  Psychiatric:Mood and affect appropriate  Neurologic:Cranial Nerves II-XII grossly intact, bilateral lower extremity muscle stretch reflexes absent at knees and ankles, normal LE strength throughout but with pain with knee extension, negative seated SLR bilaterally  Musculoskeletal:normal, except for significant pain with lumbar extension reproducing pain complaint, tender over lumbar paraspinal region and left SI joint    Imaging

## 2021-08-05 NOTE — TELEPHONE ENCOUNTER
Patient saw Dr Hugo Christianson today, she is scheduled for and MBB with him  Patient expressed that she is in a lot of pain both with her back and knee  She is currently scheduled for her gel injections 8/27  She is requesting to be seen sooner  Is there anyway to get patient in sooner to double book or to see someone else to give the injection?

## 2021-08-06 NOTE — TELEPHONE ENCOUNTER
RN s/w pt regarding previous  Pt appreciative of information and already scheduled for her MBB's in September  RN gave teaching regarding MBB's and RFA  Pt very appreciative and excited for same

## 2021-08-27 ENCOUNTER — PROCEDURE VISIT (OUTPATIENT)
Dept: OBGYN CLINIC | Facility: MEDICAL CENTER | Age: 46
End: 2021-08-27
Payer: COMMERCIAL

## 2021-08-27 VITALS
WEIGHT: 293 LBS | HEART RATE: 99 BPM | SYSTOLIC BLOOD PRESSURE: 150 MMHG | BODY MASS INDEX: 45.99 KG/M2 | HEIGHT: 67 IN | DIASTOLIC BLOOD PRESSURE: 97 MMHG

## 2021-08-27 DIAGNOSIS — M17.12 PRIMARY OSTEOARTHRITIS OF LEFT KNEE: ICD-10-CM

## 2021-08-27 DIAGNOSIS — E66.01 CLASS 3 SEVERE OBESITY WITHOUT SERIOUS COMORBIDITY WITH BODY MASS INDEX (BMI) OF 45.0 TO 49.9 IN ADULT, UNSPECIFIED OBESITY TYPE (HCC): ICD-10-CM

## 2021-08-27 DIAGNOSIS — M17.11 PRIMARY OSTEOARTHRITIS OF RIGHT KNEE: ICD-10-CM

## 2021-08-27 DIAGNOSIS — M79.671 RIGHT FOOT PAIN: Primary | ICD-10-CM

## 2021-08-27 PROCEDURE — 20610 DRAIN/INJ JOINT/BURSA W/O US: CPT | Performed by: ORTHOPAEDIC SURGERY

## 2021-08-27 PROCEDURE — 99214 OFFICE O/P EST MOD 30 MIN: CPT | Performed by: ORTHOPAEDIC SURGERY

## 2021-08-27 RX ORDER — AMLODIPINE BESYLATE 5 MG/1
5 TABLET ORAL DAILY
COMMUNITY
Start: 2021-08-23 | End: 2021-10-22 | Stop reason: DRUGHIGH

## 2021-08-27 RX ORDER — BUPIVACAINE HYDROCHLORIDE 2.5 MG/ML
4 INJECTION, SOLUTION INFILTRATION; PERINEURAL
Status: COMPLETED | OUTPATIENT
Start: 2021-08-27 | End: 2021-08-27

## 2021-08-27 RX ORDER — LIDOCAINE HYDROCHLORIDE 10 MG/ML
5 INJECTION, SOLUTION INFILTRATION; PERINEURAL
Status: COMPLETED | OUTPATIENT
Start: 2021-08-27 | End: 2021-08-27

## 2021-08-27 RX ORDER — METHYLPREDNISOLONE ACETATE 40 MG/ML
1 INJECTION, SUSPENSION INTRA-ARTICULAR; INTRALESIONAL; INTRAMUSCULAR; SOFT TISSUE
Status: COMPLETED | OUTPATIENT
Start: 2021-08-27 | End: 2021-08-27

## 2021-08-27 RX ADMIN — BUPIVACAINE HYDROCHLORIDE 4 ML: 2.5 INJECTION, SOLUTION INFILTRATION; PERINEURAL at 11:33

## 2021-08-27 RX ADMIN — LIDOCAINE HYDROCHLORIDE 5 ML: 10 INJECTION, SOLUTION INFILTRATION; PERINEURAL at 11:33

## 2021-08-27 RX ADMIN — METHYLPREDNISOLONE ACETATE 1 ML: 40 INJECTION, SUSPENSION INTRA-ARTICULAR; INTRALESIONAL; INTRAMUSCULAR; SOFT TISSUE at 11:33

## 2021-08-27 NOTE — PROGRESS NOTES
Orthopaedic Surgery - Office Note  Cassandra Ruby (79 y o  female)   : 1975   MRN: [id number]  Encounter Date: 2021    Chief Complaint   Patient presents with    Left Knee - Follow-up       Assessment / Plan    Bilateral knee osteoarthritis     · After discussion of risk and benefits the patient did agree to proceed with a Visco supplement injection with Orthovisc in her left knee  This was prepared and injected sterilely after aspiration and tolerated well  · After discussion of risk and benefits the patient also agreed to proceed with a steroid injection of the right knee  This was also prepared injected sterilely and tolerated well  · Continue with home exercise program for both knees  · Patient was consulted on weight management and referred to the weight management center for further recommendations  · Continue with ice and analgesics as needed for both knees  · Her right heel I did provide a referral to Dr Cici Terry for further evaluation  Return in about 1 week (around 9/3/2021) for as scheduled for 2 of 3 L knee Orthovisc  History of Present Illness  Cassandra Ruby is a 50 y o  female   Follow-up for osteoarthritis of the left knee and now with increasing discomfort in her right knee which also has known arthritis but is not needed treatment in the past   She is scheduled to start viscosupplementation today with Orthovisc in the left knee  Her pain in her left knee has continued to limit her  She has had steroid injections in the left knee in the past with limited benefit  She has also tried to continue weight loss on her own and therapy exercises which have been difficult due to her continued pain and limitations  She feels that over the last few weeks she has been having more pain in her right knee and her right heel  She was referred in the past to see somebody for her right heel but has not been able to go would like to pursue this at this time    The right knee pain is in both joint lines at this time she denies any catching or locking with does feel crepitus at times  Her pain bilaterally is worse with WB activities and is better at rest   Patient is currently unable to work due to her discomfort  She would like to try steroid injection or some other treatment for her right knee at this time  Review of Systems  Pertinent items are noted in HPI  All other systems were reviewed and are negative  Physical Exam  /97   Pulse 99   Ht 5' 7" (1 702 m)   Wt 133 kg (293 lb)   BMI 45 89 kg/m²   Cons: Appears well  No apparent distress  Psych: Alert  Oriented x3  Mood and affect normal   Eyes: PERRLA, EOMI  Resp: Normal effort  No audible wheezing or stridor  CV: Palpable pulse  No discernable arrhythmia  No LE edema  Lymph:  No palpable cervical, axillary, or inguinal lymphadenopathy  Skin: Warm  No palpable masses  No visible lesions  Neuro: Normal muscle tone  Normal and symmetric DTR's  Bilateral Knee Exam  Alignment:  Normal knee alignment  Inspection:   Moderate effusion/swelling left knee  No swelling right knee  No erythema  No ecchymosis  No deformity  Trace effusion  Palpation:  medial and lateral joint line tenderness  ROM:  Knee Extension 5  Knee Flexion 110  Strength:  She is able to extend both knees fully against gravity  Stability:  No objective knee instability  Stable Varus / Valgus stress, Lachman, and Posterior drawer  Tests:  (+) Chavez  Patella:  Patella tracks centrally with crepitus  Neurovascular:  Sensation intact in DP/SP/George/Sa/T nerve distributions  2+ DP & PT pulses  Gait:  Antalgic  Studies Reviewed  I have personally reviewed pertinent films in PACS  XR of left knee -  Shows severe narrowing of the medial joint line with spurring on the medial and lateral joint lines along with the patella  Subchondral sclerosis noted throughout the knee       XR of the right knee-  from 2018 shows mild narrowing of the medial joint line with spurring on the lateral and patellofemoral surfaces  Large joint arthrocentesis: R knee  Universal Protocol:  Consent: Verbal consent obtained  Consent given by: patient    Supporting Documentation  Indications: pain   Procedure Details  Location: knee - R knee  Needle size: 25 G  Approach: anterolateral  Medications administered: 4 mL bupivacaine 0 25 %; 1 mL methylPREDNISolone acetate 40 mg/mL    Patient tolerance: patient tolerated the procedure well with no immediate complications  Dressing:  Sterile dressing applied    Large joint arthrocentesis: L knee  Universal Protocol:  Consent: Verbal consent obtained  Consent given by: patient    Supporting Documentation  Indications: pain and joint swelling   Procedure Details  Location: knee - L knee  Needle size: 18 G  Approach: lateral  Medications administered: 5 mL lidocaine 1 %; 30 mg sodium hyaluronate 30 mg/2 mL    Aspirate amount: 7 mL  Aspirate: clear, serous and yellow    Patient tolerance: patient tolerated the procedure well with no immediate complications  Dressing:  Sterile dressing applied             Medical, Surgical, Family, and Social History  The patient's medical history, family history, and social history, were reviewed and updated as appropriate      Past Medical History:   Diagnosis Date    Arthritis     Chronic pain     Fibromyalgia, primary     Hiatal hernia     History of transfusion     Years ago    Hypertension        Past Surgical History:   Procedure Laterality Date     SECTION      CHOLECYSTECTOMY      HERNIA REPAIR      KIDNEY SURGERY      LIVER SURGERY         Family History   Problem Relation Age of Onset    Cancer Mother     Diabetes Father     Heart disease Father     Cancer Father        Social History     Occupational History    Not on file   Tobacco Use    Smoking status: Current Every Day Smoker     Packs/day:  00     Types: Cigarettes    Smokeless tobacco: Never Used   Vaping Use  Vaping Use: Never used   Substance and Sexual Activity    Alcohol use: No    Drug use: No    Sexual activity: Not on file       No Known Allergies      Current Outpatient Medications:     acetaminophen (TYLENOL) 325 mg tablet, Take 650 mg by mouth every 6 (six) hours as needed for mild pain, Disp: , Rfl:     amLODIPine (NORVASC) 5 mg tablet, Take 5 mg by mouth daily, Disp: , Rfl:     ibuprofen (MOTRIN) 100 mg/5 mL suspension, Take by mouth every 6 (six) hours as needed for mild pain, Disp: , Rfl:       Craig Gonzalez PA-C    Scribe Attestation    I,:   am acting as a scribe while in the presence of the attending physician :       I,:   personally performed the services described in this documentation    as scribed in my presence :

## 2021-09-03 ENCOUNTER — PROCEDURE VISIT (OUTPATIENT)
Dept: OBGYN CLINIC | Facility: MEDICAL CENTER | Age: 46
End: 2021-09-03
Payer: COMMERCIAL

## 2021-09-03 VITALS
HEIGHT: 67 IN | SYSTOLIC BLOOD PRESSURE: 136 MMHG | DIASTOLIC BLOOD PRESSURE: 85 MMHG | BODY MASS INDEX: 45.99 KG/M2 | HEART RATE: 90 BPM | WEIGHT: 293 LBS

## 2021-09-03 DIAGNOSIS — M17.12 PRIMARY OSTEOARTHRITIS OF LEFT KNEE: Primary | ICD-10-CM

## 2021-09-03 PROCEDURE — 20610 DRAIN/INJ JOINT/BURSA W/O US: CPT | Performed by: PHYSICIAN ASSISTANT

## 2021-09-03 RX ORDER — ATORVASTATIN CALCIUM 40 MG/1
40 TABLET, FILM COATED ORAL DAILY
COMMUNITY
Start: 2021-08-31

## 2021-09-03 RX ORDER — TIZANIDINE 2 MG/1
2 TABLET ORAL AS NEEDED
COMMUNITY
Start: 2021-09-01 | End: 2022-08-03 | Stop reason: SDUPTHER

## 2021-09-03 RX ORDER — ALBUTEROL SULFATE 90 UG/1
2 AEROSOL, METERED RESPIRATORY (INHALATION) AS NEEDED
COMMUNITY
Start: 2021-09-01

## 2021-09-03 RX ORDER — NICOTINE 21 MG/24HR
1 PATCH, TRANSDERMAL 24 HOURS TRANSDERMAL EVERY 24 HOURS
COMMUNITY
Start: 2021-09-01 | End: 2022-02-28

## 2021-09-03 NOTE — PROGRESS NOTES
Orthopaedic Surgery - Office Note  Manolo Dubose (73 y o  female)   : 1975   MRN: [id number]  Encounter Date: 9/3/2021    Chief Complaint   Patient presents with    Left Knee - Follow-up       Assessment / Plan    Bilateral knee osteoarthritis     · After discussion of risk and benefits the patient did agree to proceed with a Visco supplement injection  2 of 3 with Orthovisc in her left knee  This was prepared and injected sterilely after aspiration and tolerated well  · After discussion of risk and benefits the patient also agreed to proceed with a steroid injection of the right knee  This was also prepared injected sterilely and tolerated well  · Continue with home exercise program for both knees  · Patient did attempt to see weight management but stated that her insurance did not cover this treatment  She states that they were going to provide her with a quote and more information to see if she can afford to go  · Continue with ice and analgesics as needed for both knees  Return for   In 1 week for injection 3 of 3 Orthovisc left knee  History of Present Illness  Manolo Dubose is a 50 y o  female following up for osteoarthritis of the left knee for Orthovisc injection 2 of 3 in the left knee  Last visit she did have a steroid injection in right knee which she feels has helped significantly  She feels that the and left knee and has not changed much since her 1st Orthovisc injection  She has also tried to continue weight loss on her own and therapy exercises which have been difficult due to her continued pain and limitations  Her pain bilaterally is worse with WB activities and is better at rest   Patient is currently unable to work due to her discomfort  Review of Systems  Pertinent items are noted in HPI  All other systems were reviewed and are negative  Physical Exam  /85   Pulse 90   Ht 5' 7" (1 702 m)   Wt 133 kg (293 lb)   BMI 45 89 kg/m²   Cons: Appears well    No apparent distress  Psych: Alert  Oriented x3  Mood and affect normal   Eyes: PERRLA, EOMI  Resp: Normal effort  No audible wheezing or stridor  CV: Palpable pulse  No discernable arrhythmia  No LE edema  Lymph:  No palpable cervical, axillary, or inguinal lymphadenopathy  Skin: Warm  No palpable masses  No visible lesions  Neuro: Normal muscle tone  Normal and symmetric DTR's  Left Knee Exam  Alignment:  Normal knee alignment  Inspection:   Mild effusion/swelling left knee  No swelling right knee  No erythema  No ecchymosis  No deformity  Trace effusion  Palpation:  medial and lateral joint line tenderness  ROM:  Knee Extension 2  Knee Flexion 110  Strength:  She is able to extend both knees fully against gravity  Stability:  No objective knee instability  Stable Varus / Valgus stress, Lachman, and Posterior drawer  Tests:  (+) Chavez  Patella:  Patella tracks centrally with crepitus  Neurovascular:  Sensation intact in DP/SP/George/Sa/T nerve distributions  2+ DP & PT pulses  Gait:  Antalgic  Studies Reviewed  I have personally reviewed pertinent films in PACS  XR of left knee -  Shows severe narrowing of the medial joint line with spurring on the medial and lateral joint lines along with the patella  Subchondral sclerosis noted throughout the knee  XR of the right knee-  from 2018 shows mild narrowing of the medial joint line with spurring on the lateral and patellofemoral surfaces  Large joint arthrocentesis: L knee  Universal Protocol:  Consent: Verbal consent obtained    Consent given by: patient    Supporting Documentation  Indications: pain   Procedure Details  Location: knee - L knee  Needle size: 22 G  Approach: anterolateral  Medications administered: 30 mg sodium hyaluronate 30 mg/2 mL    Patient tolerance: patient tolerated the procedure well with no immediate complications  Dressing:  Sterile dressing applied             Medical, Surgical, Family, and Social History  The patient's medical history, family history, and social history, were reviewed and updated as appropriate      Past Medical History:   Diagnosis Date    Arthritis     Chronic pain     Fibromyalgia, primary     Hiatal hernia     History of transfusion     Years ago    Hypertension        Past Surgical History:   Procedure Laterality Date     SECTION      CHOLECYSTECTOMY      HERNIA REPAIR      KIDNEY SURGERY      LIVER SURGERY         Family History   Problem Relation Age of Onset    Cancer Mother     Diabetes Father     Heart disease Father     Cancer Father        Social History     Occupational History    Not on file   Tobacco Use    Smoking status: Current Every Day Smoker     Packs/day: 1 00     Types: Cigarettes    Smokeless tobacco: Never Used   Vaping Use    Vaping Use: Never used   Substance and Sexual Activity    Alcohol use: No    Drug use: No    Sexual activity: Not on file       No Known Allergies      Current Outpatient Medications:     acetaminophen (TYLENOL) 325 mg tablet, Take 650 mg by mouth every 6 (six) hours as needed for mild pain, Disp: , Rfl:     amLODIPine (NORVASC) 5 mg tablet, Take 5 mg by mouth daily, Disp: , Rfl:     ibuprofen (MOTRIN) 100 mg/5 mL suspension, Take by mouth every 6 (six) hours as needed for mild pain, Disp: , Rfl:     nicotine (NICODERM CQ) 14 mg/24hr TD 24 hr patch, Place 1 patch on the skin every 24 hours, Disp: , Rfl:     nicotine polacrilex (NICORETTE) 2 mg gum, Chew 2 mg, Disp: , Rfl:     albuterol (PROVENTIL HFA,VENTOLIN HFA) 90 mcg/act inhaler, , Disp: , Rfl:     atorvastatin (LIPITOR) 40 mg tablet, , Disp: , Rfl:     tiZANidine (ZANAFLEX) 2 mg tablet, , Disp: , Rfl:       Jani Aguilar PA-C    Scribe Attestation    I,:   am acting as a scribe while in the presence of the attending physician :       I,:   personally performed the services described in this documentation    as scribed in my presence :

## 2021-09-07 ENCOUNTER — OFFICE VISIT (OUTPATIENT)
Dept: OBGYN CLINIC | Facility: CLINIC | Age: 46
End: 2021-09-07
Payer: COMMERCIAL

## 2021-09-07 ENCOUNTER — APPOINTMENT (OUTPATIENT)
Dept: RADIOLOGY | Facility: AMBULARY SURGERY CENTER | Age: 46
End: 2021-09-07
Attending: ORTHOPAEDIC SURGERY
Payer: COMMERCIAL

## 2021-09-07 VITALS
DIASTOLIC BLOOD PRESSURE: 73 MMHG | WEIGHT: 291 LBS | HEART RATE: 81 BPM | SYSTOLIC BLOOD PRESSURE: 126 MMHG | BODY MASS INDEX: 45.67 KG/M2 | RESPIRATION RATE: 17 BRPM | HEIGHT: 67 IN

## 2021-09-07 DIAGNOSIS — M76.61 ACHILLES TENDINITIS OF RIGHT LOWER EXTREMITY: ICD-10-CM

## 2021-09-07 DIAGNOSIS — M25.571 PAIN, JOINT, ANKLE AND FOOT, RIGHT: ICD-10-CM

## 2021-09-07 DIAGNOSIS — M79.671 RIGHT FOOT PAIN: ICD-10-CM

## 2021-09-07 DIAGNOSIS — M79.671 PAIN OF RIGHT HEEL: ICD-10-CM

## 2021-09-07 DIAGNOSIS — M19.079 ARTHRITIS OF MIDFOOT: Primary | ICD-10-CM

## 2021-09-07 PROCEDURE — 73630 X-RAY EXAM OF FOOT: CPT

## 2021-09-07 PROCEDURE — 73610 X-RAY EXAM OF ANKLE: CPT

## 2021-09-07 PROCEDURE — 99213 OFFICE O/P EST LOW 20 MIN: CPT | Performed by: ORTHOPAEDIC SURGERY

## 2021-09-07 RX ORDER — METHYLPREDNISOLONE 4 MG/1
TABLET ORAL
Qty: 1 EACH | Refills: 0 | Status: SHIPPED | OUTPATIENT
Start: 2021-09-07 | End: 2021-10-22 | Stop reason: ALTCHOICE

## 2021-09-07 NOTE — PATIENT INSTRUCTIONS
Achilles Tendonitis (Tendinitis)  Tendonitis a swelling and soreness of the tendon  The pain in the tendon (cord like structure which attaches muscle to bone) is produced by tiny tears and the inflammation present in that tendon  It commonly occurs at the shoulders, heels, and elbows  It is usually caused by overusing the tendon and joint involved  Achilles tendonitis involves the Achilles tendon  This is the large tendon in the back of the leg just above the foot  It attaches the large muscles of the lower leg to the heel bone (called calcaneus)  This diagnosis (learning what is wrong) is made by examination  X-rays will be generally be normal if only tendonitis is present  However, occasionally with insertional achilles tendinitis, there can be a calcification (enthesopathy/bone spur) noted  HOME CARE INSTRUCTIONS  · Apply ice to the injury for 10 to 20 minutes 3 or 4 times per day  Put the ice in a plastic bag and place a towel between the bag of ice and your skin  · Use Heel lifts as instructed  · Try to avoid use other than gentle range of motion while the tendon is painful  Do not resume use until instructed by your caregiver  Then begin use gradually  Do not increase use to the point of pain  If pain does develop, decrease use and continue the above measures  Gradually increase activities that do not cause discomfort until you gradually achieve normal use  · Only take over-the-counter or prescription medicines for pain, discomfort, or fever as directed by your caregiver  The Medrol dose pack, if prescribed, will only last a few days  It is important to follow the other instructions in order to see improvements that last   · Specific achilles stretches and rehab are usually required for tendinitis that does not improve with acute treatment   Typically these are done under the care of a physical therapist     [address] Ordonez IF:  · Your pain and swelling increase or pain is uncontrolled with medications  · You develop new, unexplained problems (symptoms) or an increase of the symptoms that brought you to your caregiver  · You develop an inability to move your toes or foot, develop warmth and swelling in your foot, or begin running an unexplained temperature  MAKE SURE YOU:   · Understand these instructions  · Will watch your condition  · Will get help right away if you are not doing well or get worse  Rehab  Achilles tendonitis is disorder of the Achilles tendon  The Achiles tendon connects the large calf muscles (Gastrocnemius and Soleus) to the heel bone (calcaneus)  This tendon is sometimes called the heel cord  It is important for pushing-off and standing on your toes and is important for walking, running, or jumping  Tendonitis often caused by overuse and repetitive microtrauma  SYMPTOMS  · Pain, tenderness, swelling, warmth, and redness may occur over the Achilles tendon even at rest    · Pain with pushing off, or flexing or extending the ankle  · Pain that is worsened after or during activity  CAUSES  · Over use sometimes seen with rapid increase in exercise programs or in sports requiring running and jumping  · Poor physical conditioning (strength and flexibility/endurance)  · Running sports, especially training running down hills  · Inadequate warm-up before practice or play or failure to stretch before participation  · Injury to the tendon  PREVENTION   · Warm up and stretch before practice or competition  · Allow time for adequate rest and recovery between practices and competition  · Keep up conditioning  · Keep up ankle and leg flexibility  · Improve or keep muscle strength and endurance  · Improve cardiovascular fitness  · Use proper technique  · Use of proper equipment (shoes, skates, etc)   · To help prevent recurrence, taping, protective strapping, or an adhesive bandage may be recommended for several weeks after healing is complete  PROGNOSIS  · Recovery may take weeks to several months to heal    · Longer recovery is expected if symptoms have been prolonged   · Recovery is usually quicker if the inflammation is due to a direct blow as compared with overuse or sudden strain  COMPLICATIONS   · Healing time will be prolonged if the condition is not correctly treated  The injury must be given plenty of time to heal    · Symptoms can reoccur if activity is resumed too soon  · Untreated, tendinitis may increase the risk of tendon rupture requiring additional time for recovery and possibly surgery  TREATMENT   · The first treatment consists of, rest, anti-inflammatory medication and ice to relieve the pain  · Stretching and strengthening exercises after resolution of pain, will likely help reduce the risk of recurrence  Referral to a physical therapist or  for further evaluation and treatment may be helpful  · A walking boot or cast may be recommended to rest the Achilles tendon  This can help break the cycle of inflammation and microtrauma  · Arch supports (orthotics ) may be prescribed or recommended by your caregiver as an adjunct to therapy and rest    · Surgery to remove the inflamed tendon lining or degenerated tendon tissue is rarely necessary and has shown less than predictable results  MEDICATION   · Nonsteroidal anti-inflammatory medications, such as aspirin and ibuprofen, may be used for pain and inflammation relief  Do not take within 7 days before surgery  Take these as directed by your caregiver  Contact your caregiver immediately if any bleeding, stomach upset, or signs of allergic reaction occur  Other minor pain relievers, such as acetaminophen, may also be used  · Pain relievers may be prescribed as necessary by your caregiver  Do not take prescription pain medication for longer than 4 to 7 days  Use only as directed and only as much as you need     · Cortisone injections are rarely if ever indicated  Cortisone injections may weaken tendons and predispose to rupture  It is better to give the condition more time to heal than to use them  HEAT AND COLD:   · Cold is used to relieve pain and reduce inflammation for acute and chronic Achilles tendinitis  Cold should be applied for 10 to 15 minutes every 2 to 3 hours for inflammation and pain and immediately after any activity that aggravates your symptoms  Use ice packs or an ice massage  · Heat may be used before performing stretching and strengthening activities prescribed by your caregiver  Use a heat pack or a warm soak  SEEK MEDICAL CARE IF:   · Symptoms get worse or do not improve in 2 weeks despite treatment  · New, unexplained symptoms develop  Drugs used in treatment may produce side effects  EXERCISES  RANGE OF MOTION AND STRETCHING EXERCISES - Achilles Tendinitis   These exercises may help you when beginning to rehabilitate your injury  Your symptoms may resolve with or without further involvement from your physician, physical therapist or   While completing these exercises, remember:   · Restoring tissue flexibility helps normal motion to return to the joints  This allows healthier, less painful movement and activity  · An effective stretch should be held for at least 30 seconds  · A stretch should never be painful  You should only feel a gentle lengthening or release in the stretched tissue  STRETCH - Gastroc, Standing   · Place hands on wall  · Extend leg, keeping the front knee somewhat bent  · Slightly point your toes inward on your back foot  · Keeping your heel on the floor and your knee straight, shift your weight toward the wall, not allowing your back to arch  · You should feel a gentle stretch in the calf  Hold this position for 10 seconds  STRETCH - Soleus, Standing   · Place hands on wall  · Extend leg, keeping the other knee somewhat bent     · Slightly point your toes inward on your back foot  · Keep your heel on the floor, bend your back knee, and slightly shift your weight over the back leg so that you feel a gentle stretch deep in your back calf  · Hold this position for 10 seconds     STRETCH - Gastrocsoleus, Standing   Note: This exercise can place a lot of stress on your foot and ankle  Please complete this exercise only if specifically instructed by your caregiver  · Place the ball of your foot on a step, keeping your other foot firmly on the same step  · Hold on to the wall or a rail for balance  · Slowly lift your other foot, allowing your body weight to press your heel down over the edge of the step  · You should feel a stretch in your calf  · Hold this position for 10 seconds  · Repeat this exercise with a slight bend in your knee  STRENGTHENING EXERCISES - Achilles Tendinitis  These exercises may help you when beginning to rehabilitate your injury  They may resolve your symptoms with or without further involvement from your physician, physical therapist or   While completing these exercises, remember:   · Muscles can gain both the endurance and the strength needed for everyday activities through controlled exercises  · Complete these exercises as instructed by your physician, physical therapist or   Progress the resistance and repetitions only as guided  · You may experience muscle soreness or fatigue, but the pain or discomfort you are trying to eliminate should never worsen during these exercises  If this pain does worsen, stop and make certain you are following the directions exactly  If the pain is still present after adjustments, discontinue the exercise until you can discuss the trouble with your clinician  STRENGTH - Plantar-flexors   · Sit with your affected leg extended  Holding onto both ends of a rubber exercise band/tubing, loop it around the ball of your foot  Keep a slight tension in the band  · Slowly push your toes away from you, pointing them downward  · Hold this position for 10 seconds  Return slowly, controlling the tension in the band/tubing  STRENGTH - Plantar-flexors   · Stand with your feet shoulder width apart  Steady yourself with a wall or table using as little support as needed  · Keeping your weight evenly spread over the width of your feet, rise up on your toes  STRENGTH - Plantar-flexors, Eccentric   Note: This exercise can place a lot of stress on your foot and ankle  Please complete this exercise only if specifically instructed by your caregiver  · Place the balls of your feet on a step  With your hands, use only enough support from a wall or rail to keep your balance  · Keep your knees straight and rise up on your toes  · Slowly shift your weight entirely to your toes and  your opposite foot  Gently and with controlled movement, lower your weight through your foot so that your heel drops below the level of the step  You will feel a slight stretch in the back of your calf at the end position  · Use the healthy leg to help rise up onto the balls of both feet, then lower weight only on the leg again  Build up to 15 repetitions  Then progress to 3 consecutive sets of 15 repetitions  *   · After completing the above exercise, complete the same exercise with a slight knee bend (about 30 degrees)  Again, build up to 15 repetitions  Then progress to 3 consecutive sets of 15 repetitions  *   *When you easily complete 3 sets of 15, your physician, physical therapist or  may advise you to add resistance by wearing a backpack filled with additional weight  STRENGTH - Plantar Flexors, Seated   · Sit on a chair that allows your feet to rest flat on the ground  If necessary, sit at the edge of the chair  · Keeping your toes firmly on the ground, lift your heel as far as you can without increasing any discomfort in your ankle         WEAR SUPPORTIVE SNEAKERS UNTIL THIS IMPROVES  Rom[employer] Kim, [employer] are good brands but I recommend going to a dedicate shoe store (not [employer] or [employer]) will provide you access to shoe experts that will appropriately fit your shoes  At these types of stores, they have experts that can fit you for shoes appropriate for your foot problem  [place]  [address] [facility], [address] [facility], [address]  [address] [facility], [facility] [place]     [employer]  [address], [place], [address]  [facility] [address] [facility], [address] [place] [address]    -------------------------------------------------    You have 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  This is a wearing away of the cartilage in your foot leading to bone against bone which causes inflammation which causes pain  The nonoperative treatment for this involves a carbon fiber foot plate and injections under fluoroscopic guidance  You may schedule your injection with our musculoskeletal radiology team at your earliest convenience  Go to [employer] and type in "Full length carbon fiber foot plate " Pick an item that looks like the one below  Make sure it is the appropriate size for your foot (match your shoe size)  This insert goes under the orthotic in your shoe to stiffen the shoe  If the injections and carbon fiber foot plate are not effective, you will be a candidate for a midfoot fusion surgery  Midfoot Fusion     What is the midfoot? The midfoot refers to the bones and joints that make up the arch and connect the forefoot (which includes the bones of the toes) to the hindfoot (which includes the ankle bone and the heel bone)  What is a midfoot fusion?     Midfoot fusion is a procedure in which the separate bones that make up the arch of the foot are fused into a single mass of bone  Fusion is also referred to as arthrodesis  Fusion eliminates the normal motion that occurs between two bones  At left, this standing X-ray view of a right foot shows a gap between the first and second metatarsals (arrow)  At right, this side X-ray view of the same foot shows loss of alignment  In a healthy foot, the two yellow lines would overlap one another  Midfoot fusion can involve all of the midfoot joints  More commonly just one or a few of the joints are fused  The joints of the midfoot do not bend and move like your knee or elbow  They are designed to be relatively stiff to give your foot strength and support your body  Midfoot fusion does not generally produce much noticeable loss of motion because there is fairly little motion to begin with  What are the goals of a midfoot fusion? The primary goal of midfoot fusion is to decrease pain and improve function  Eliminating the painful motion between arthritic joint surfaces and restoring the bones to their normal positions achieves this  Other goals include the correction of deformity and the return of stability to the arch of the foot  A successful midfoot fusion can achieve these goals and restore more normal walking ability  What signs indicate a midfoot fusion may be needed? The most common reason for midfoot fusion is painful arthritis in the midfoot joints that has not improved with nonsurgical treatment  Other common reasons to do a midfoot fusion include too much motion of one or more of the midfoot joints or deformity of the midfoot  Examples of conditions that may result in midfoot deformity include severe bunions and flatfoot deformity  Midfoot fusion is also indicated for certain acute fractures and joint displacement involving the midfoot  When should I avoid surgery?   Midfoot fusion should not be performed if there is active infection or if the patients health is poor enough that the risk of surgery is too high  Conditions such as uncontrolled diabetes and blood flow problems may make a patient a poor candidate for surgery  Other reasons to not perform midfoot fusion include osteoporosis and poor skin quality  Smoking significantly increases the risk that bones will not fuse  General Details of Procedure  Successful midfoot fusion depends on complete removal of all joint surfaces (cartilage) and stable fixation of the joints being fused  Residual cartilage can prevent the bones from fusing together  Failure to achieve adequate stability may allow too           Post-surgery X-rays showing correction  The arrow shows that the gap has been eliminated, and a single line can be drawn through the middle of the foot bones, which means the foot is now in the proper position  Screws and plates were used for the repair  much motion for fusion to occur  Specific Techniques  Midfoot fusion is generally accomplished using one or two incisions on the top of the foot  The length of the incision and how many incisions are necessary is determined by the number of joints to be fused  Careful attention is paid to protecting tendons and nerves  Stability is achieved during midfoot fusion using metal implants such as screws and plates  These are designed to immobilize the joints and allow for the formation of bone across the joint space  This process may involve the addition of bone graft material to fill any gaps that might exist between the bones after the cartilage has been removed  This bone graft material may be taken from another location in the patients body (autograft)  It may also come from donated bone (allograft) or from a synthetic material  A combination of these materials may be used  What happens after surgery? After surgery a period of protection and immobilization is required for successful fusion to occur  A cast is typically placed for the first six to 10 weeks  Weightbearing is not allowed on the affected foot for eight to 12 weeks after surgery  X-rays are usually obtained every four weeks to assess progress of the fusion  Gradually increased weightbearing is allowed as healing progresses  Initial weightbearing is protected in a prefabricated boot with gradual transition to supportive shoes  Physical therapy may be prescribed on a case-by-case basis to help the patients walking and balance  Potential Complications  There are complications that relate to surgery in general  These include risks associated with anesthesia, infection, damage to nerves and blood vessels, and bleeding or blood clots  A major potential complication after midfoot fusion is failure of the bones to fuse (nonunion)  Other complications can include over-correction or under-correction of deformity (malunion)  There can be problems with wound healing  Prominent plates and screws can be painful and may require removal of the hardware  Injury to nerves on the top of the foot can occur  Smoking is one of the leading risks for nonunion  Premature weightbearing can also result in failure of the bones to fuse  Frequently Asked Questions  How much motion in my foot will I lose after midfoot fusion? Motion of the midfoot joints is normally somewhat limited  Loss of that motion after fusion surgery tends to be well tolerated by patients  The more mobile joints of the ankle, hindfoot and forefoot are unaffected by midfoot fusion and thus continue to provide motion to the foot  Will I set off an airport metal detector after midfoot fusion? The strength of the metal detector and the amount of metal implants used determine whether hardware from a midfoot fusion will be detected  It is uncommon for the metal implants to be detectable by airport screening methods  How will I get around after surgery before I am allowed to put any weight on the foot?   A combination of devices can be used, including crutches, walkers, knee-rollers, scooters and wheelchairs  Physical therapy is also used to help assess patient needs and improve mobility and safety  Certain patients may benefit from the assistance provided by a skilled nursing facility or post-operative rehabilitation unit  Will the plates and screws have to be removed after midfoot fusion? Metal implants used for midfoot fusion are not routinely removed  Hardware may need to be removed if there is a failure of the fusion or if infection develops  Painful hardware can be removed once the fusion is healed

## 2021-09-07 NOTE — PROGRESS NOTES
ALEXIS Willard  Attending, Orthopaedic Surgery  Foot and [address] [facility] [address] [facility]        ORTHOPAEDIC FOOT AND ANKLE CLINIC VISIT     Assessment:     Encounter Diagnoses   Name Primary? 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 insertional achilles tendinopathy  · Heel lifts, formal PT, supportive shoe wear, and a Medrol dosepak were provided to conservatively treat her achilles  · Recommended obtained a carbon fiber foot plate for additional support for her 2nd and 3rd TMT arthritis  · FL guided 2nd and 3rd TMT joint injections were ordered for pain relief  Advised if relief is achieve injections can be repeated every 3 months  · If conservative treatment fails, surgical intervention would consists of a 2nd and 3rd TMT arthrodesis and a meet's resection although I would perform these surgeries separately preferably  Return in about 3 months (around 12/7/2021)  History of Present Illness:   Chief Complaint:   Chief Complaint   Patient presents with    Right Foot - Pain    Right Ankle - Pain     Cam Stiles is a 50 y o  female who is being seen for right foot and ankle pain  Patient notes having a worker's comp injury 3 years ago where a large tote fell on her great toe of the right foot resulting in a fracture  She noted that due to losing that job proper treatment was not provided   She also notes a large lump oh her achilles insertion, which has been increasing in size  She states pain and growth began 1 year ago and is worsening  Pain is localized at great toe and achilles insertion with minimal radiating and described as sharp and severe  Patient denies numbness, tingling or radicular pain  Denies history of neuropathy  Patient does  smoke, does not have diabetes and does not take blood thinners  Patient denies family history of anesthesia complications and has not had any complications with anesthesia  Pain/symptom timing:  Worse during the day when active  Pain/symptom context:  Worse with activites and work  Pain/symptom modifying factors:  Rest makes better, activities make worse  Pain/symptom associated signs/symptoms: none    Prior treatment   · NSAIDsNo    · Injections No   · Bracing/Orthotics No   · Physical Therapy No     Orthopedic Surgical History:   See Below    Past Medical, Surgical and Social History:  Past Medical History:  has a past medical history of Arthritis, Chronic pain, Fibromyalgia, primary, Hiatal hernia, High cholesterol, History of transfusion, and Hypertension  Problem List: does not have any pertinent problems on file  Past Surgical History:  has a past surgical history that includes  section; Cholecystectomy; Kidney surgery; Liver surgery; and Hernia repair  Family History: family history includes Cancer in her father and mother; Diabetes in her father; Heart disease in her father  Social History:  reports that she has been smoking cigarettes  She has been smoking about 0 50 packs per day  She has never used smokeless tobacco  She reports that she does not drink alcohol and does not use drugs  Current Medications: has a current medication list which includes the following prescription(s): acetaminophen, albuterol, amlodipine, atorvastatin, ibuprofen, nicotine, tizanidine, methylprednisolone, and nicotine polacrilex  Allergies: has No Known Allergies       Review of Systems:  General- denies fever/chills  HEENT- denies hearing loss or sore throat  Eyes- denies eye pain or visual disturbances, denies red eyes  Respiratory- denies cough or SOB  Cardio- denies chest pain or palpitations  GI- denies abdominal pain  Endocrine- denies urinary frequency  Urinary- denies pain with urination  Musculoskeletal- Negative except noted above  Skin- denies rashes or wounds  Neurological- denies dizziness or headache  Psychiatric- denies anxiety or difficulty concentrating    Physical Exam:   /73 (BP Location: Right arm, Patient Position: Sitting)   Pulse 81   Resp 17   Ht 5' 7" (1 702 m)   Wt 132 kg (291 lb)   BMI 45 58 kg/m²   General/Constitutional: No apparent distress: well-nourished and well developed  Eyes: normal ocular motion  Cardio: RRR, Normal S1S2, No m/r/g  Lymphatic: No appreciable lymphadenopathy  Respiratory: Non-labored breathing, CTA b/l no w/c/r  Vascular: No edema, swelling or tenderness, except as noted in detailed exam   Integumentary: No impressive skin lesions present, except as noted in detailed exam   Neuro: No ataxia or tremors noted  Psych: Normal mood and affect, oriented to person, place and time  Appropriate affect  Musculoskeletal: Normal, except as noted in detailed exam and in HPI  Examination    Right    Gait Antalgic   Musculoskeletal Tender to palpation at great toe and achilles insertion    Skin Normal       Nails Normal    Range of Motion  10 degrees dorsiflexion, 25 degrees plantarflexion  Subtalar motion: normal with  pain    Stability Stable    Muscle Strength 5/5 tibialis anterior  5/5 gastrocnemius-soleus  5/5 posterior tibialis  5/5 peroneal/eversion strength  5/5 EHL  5/5 FHL    Neurologic Normal    Sensation  Intact to light touch throughout sural, saphenous, superficial peroneal, deep peroneal and medial/lateral plantar nerve distributions  [place]-Ofelia 5 07 filament (10g) testing  deferred      Cardiovascular Brisk capillary refill < 2 seconds,intact DP and PT pulses    Special Tests None      Imaging Studies:   3 views of the right foot were taken, reviewed and interpreted independently that demonstrate severe 2nd and 3rd TMT arthritis  Reviewed by me personally  3 views of the right ankle were taken, reviewed and interpreted independently that demonstrate large posterior heel spur  Reviewed by me personally  Victorine Molly Lachman, MD  Foot & Ankle Surgery   Department of [address] [place] [address]      I personally performed the service  Victorine Molly Lachman, MD    Scribe Attestation    I,:  Thelma Escalante MA am acting as a scribe while in the presence of the attending physician :       I,:  Norris Ahumada, MD personally performed the services described in this documentation    as scribed in my presence :

## 2021-09-08 ENCOUNTER — HOSPITAL ENCOUNTER (OUTPATIENT)
Dept: RADIOLOGY | Facility: MEDICAL CENTER | Age: 46
Discharge: HOME | End: 2021-09-08
Attending: PHYSICAL MEDICINE & REHABILITATION
Payer: COMMERCIAL

## 2021-09-08 VITALS
TEMPERATURE: 98 F | HEART RATE: 86 BPM | SYSTOLIC BLOOD PRESSURE: 137 MMHG | DIASTOLIC BLOOD PRESSURE: 85 MMHG | RESPIRATION RATE: 18 BRPM | OXYGEN SATURATION: 96 %

## 2021-09-08 DIAGNOSIS — M47.816 LUMBAR SPONDYLOSIS: ICD-10-CM

## 2021-09-08 DIAGNOSIS — G89.29 CHRONIC BILATERAL LOW BACK PAIN WITHOUT SCIATICA: ICD-10-CM

## 2021-09-08 DIAGNOSIS — M54.50 CHRONIC BILATERAL LOW BACK PAIN WITHOUT SCIATICA: ICD-10-CM

## 2021-09-08 PROCEDURE — 64494 INJ PARAVERT F JNT L/S 2 LEV: CPT | Performed by: PHYSICAL MEDICINE & REHABILITATION

## 2021-09-08 PROCEDURE — 64493 INJ PARAVERT F JNT L/S 1 LEV: CPT | Performed by: PHYSICAL MEDICINE & REHABILITATION

## 2021-09-08 RX ADMIN — Medication 3 ML: at 08:41

## 2021-09-08 NOTE — DISCHARGE INSTRUCTIONS
ACTIVITY  · Please do activities that will bring on the normal pain that we are rating  For example, if vacuuming or walking increases the pain, do that  This will give the most accurate response to the diary  · You may shower, but no tub baths today, or applied heat  CARE OF THE INJECTION SITE  · This area may be numb for several hours after the injection  · Notify the [facility] if you have any of the following:  redness, drainage, swelling, or fever above 100°F     SPECIAL INSTRUCTIONS  · Please return the MBB diary to our office by mail, fax, or drop it off  MEDICATIONS  · Please do not take any break through or short acting pain medications for 8 hours after the block  · Continue to take all routine medications  · Our office may have instructed you to hold some medications  As no general anesthesia was used in today's procedure, you should not experience any side effects related to anesthesia  If you have a problem specifically related to your procedure, please call our office at [phone]  Problems not related to your procedure should be directed to your primary care physician

## 2021-09-08 NOTE — H&P
History of Present Illness:  The patient is a 50 y o  female who presents with complaints of bilateral lower back pain    Patient Active Problem List   Diagnosis    Tear of medial meniscus of left knee, current    Primary osteoarthritis of left knee    Pyelonephritis    Wheezing    Class 3 severe obesity with body mass index (BMI) of 40 0 to 44 9 in adult ([facility] [address] )    Tobacco use    [facility]       Past Medical History:   Diagnosis Date    Arthritis     Chronic pain     Fibromyalgia, primary     Hiatal hernia     High cholesterol     History of transfusion     Years ago    Hypertension        Past Surgical History:   Procedure Laterality Date     SECTION      CHOLECYSTECTOMY      HERNIA REPAIR      KIDNEY SURGERY      LIVER SURGERY           Current Outpatient Medications:     acetaminophen (TYLENOL) 325 mg tablet, Take 650 mg by mouth every 6 (six) hours as needed for mild pain, Disp: , Rfl:     albuterol (PROVENTIL HFA,VENTOLIN HFA) 90 mcg/act inhaler, Inhale 2 puffs as needed , Disp: , Rfl:     amLODIPine (NORVASC) 5 mg tablet, Take 5 mg by mouth daily, Disp: , Rfl:     atorvastatin (LIPITOR) 40 mg tablet, Take 40 mg by mouth daily , Disp: , Rfl:     ibuprofen (MOTRIN) 100 mg/5 mL suspension, Take by mouth every 6 (six) hours as needed for mild pain, Disp: , Rfl:     methylPREDNISolone 4 MG tablet therapy pack, Use as directed on package (Patient not taking: Reported on 2021), Disp: 1 each, Rfl: 0    nicotine (NICODERM CQ) 14 mg/24hr TD 24 hr patch, Place 1 patch on the skin every 24 hours, Disp: , Rfl:     nicotine polacrilex (NICORETTE) 2 mg gum, Chew 2 mg (Patient not taking: Reported on 2021), Disp: , Rfl:     tiZANidine (ZANAFLEX) 2 mg tablet, Take 2 mg by mouth as needed , Disp: , Rfl:     Current Facility-Administered Medications:     lidocaine (PF) (XYLOCAINE-MPF) 2 % injection 4 mL, 4 mL, Perineural, Once, Theotis Covert, DO    No Known Allergies    Physical Exam:   Vitals:    09/08/21 0824   BP: 138/98   Pulse: 86   Resp: 20   Temp: 98 °F (36 7 °C)   SpO2: 96%     General: Awake, Alert, Oriented x 3, Mood and affect appropriate  Respiratory: Respirations even and unlabored  Cardiovascular: Peripheral pulses intact; no edema  Musculoskeletal Exam: bilateral lower back pain    ASA Score: 2    Patient/Chart Verification  Patient ID Verified: Verbal  ID Band Applied: No  Consents Confirmed: Procedural  H&P( within 30 days) Verified: To be obtained in the Pre-Procedure area  Interval H&P(within 24 hr) Complete (required for Outpatients and Surgery Admit only): To be obtained in the Pre-Procedure area  Allergies Reviewed: Yes  Anticoag/NSAID held?: NA  Currently on antibiotics?: No  Pregnancy denied?: Yes    Assessment:   1  Chronic bilateral low back pain without sciatica    2   Lumbar spondylosis        Plan: (B) L3-5 MBB#1

## 2021-09-10 ENCOUNTER — PROCEDURE VISIT (OUTPATIENT)
Dept: OBGYN CLINIC | Facility: MEDICAL CENTER | Age: 46
End: 2021-09-10
Payer: COMMERCIAL

## 2021-09-10 VITALS
SYSTOLIC BLOOD PRESSURE: 158 MMHG | DIASTOLIC BLOOD PRESSURE: 93 MMHG | HEIGHT: 67 IN | BODY MASS INDEX: 45.67 KG/M2 | HEART RATE: 88 BPM | WEIGHT: 291 LBS

## 2021-09-10 DIAGNOSIS — M17.12 PRIMARY OSTEOARTHRITIS OF LEFT KNEE: Primary | ICD-10-CM

## 2021-09-10 PROCEDURE — 20610 DRAIN/INJ JOINT/BURSA W/O US: CPT | Performed by: ORTHOPAEDIC SURGERY

## 2021-09-10 PROCEDURE — 99213 OFFICE O/P EST LOW 20 MIN: CPT | Performed by: ORTHOPAEDIC SURGERY

## 2021-09-10 NOTE — PROGRESS NOTES
Assessment/Plan:  1  Primary osteoarthritis of left knee      Orders Placed This Encounter   Procedures    Large joint arthrocentesis: L knee    Ambulatory referral to Physical Therapy       · Patient received left knee orthovisc injection 3/3 in the office today  Tolerated the procedure well  Advised to apply ice and avoid strenuous activity for 1-2 days as needed  · Patient's blood pressure was elevated in the office today  It was lower once rechecked  Patient denies acute symptoms  She did not want to go tot he urgent care or ED  She will call her PCP and discuss elevated BP  I also recommend that she does not get several steroids in the same week moving forward as this can cause elevation as well  · PT script provided to work on left knee strength and ROM  Return if symptoms worsen or fail to improve, for with Dr Nuha Lopez  I answered all of the patient's questions during the visit and provided education of the patient's condition during the visit  The patient verbalized understanding of the information given and agrees with the plan  This note was dictated using [facility] software  It may contain errors including improperly dictated words  Please contact physician directly for any questions  Subjective   Chief Complaint:   Chief Complaint   Patient presents with    Left Knee - Follow-up       HPI  Arethaannabella Higgins is a 50 y o  female who presents for follow up for left knee pain and OA  Patient received left knee orthovisc injection 2/3 and right knee steroid injection last week  Patient states the right knee injection helped but her left knee was very painful after the injection  She is not feeling any benefit of the VS injections so far  Patient states that her left knee feels unstable and locks at times as well  She is not taking anything for pain  Patient was prescribed medrol dose pack for her right foot and had steroid injection in her back as well   Patient is doing PT for her right foot and would like to try PT for the left knee as well  Review of Systems  ROS:    See HPI for musculoskeletal review     All other systems reviewed are negative     History:  Past Medical History:   Diagnosis Date    Arthritis     Chronic pain     Fibromyalgia, primary     Hiatal hernia     High cholesterol     History of transfusion     Years ago    Hypertension      Past Surgical History:   Procedure Laterality Date     SECTION      CHOLECYSTECTOMY      HERNIA REPAIR      KIDNEY SURGERY      LIVER SURGERY       Social History   Social History     Substance and Sexual Activity   Alcohol Use No     Social History     Substance and Sexual Activity   Drug Use No     Social History     Tobacco Use   Smoking Status Current Every Day Smoker    Packs/day: 0 50    Types: Cigarettes   Smokeless Tobacco Never Used   Tobacco Comment    in process of quitting 21     Family History:   Family History   Problem Relation Age of Onset    Cancer Mother     Diabetes Father     Heart disease Father     Cancer Father        Current Outpatient Medications on File Prior to Visit   Medication Sig Dispense Refill    acetaminophen (TYLENOL) 325 mg tablet Take 650 mg by mouth every 6 (six) hours as needed for mild pain      albuterol (PROVENTIL HFA,VENTOLIN HFA) 90 mcg/act inhaler Inhale 2 puffs as needed       amLODIPine (NORVASC) 5 mg tablet Take 5 mg by mouth daily      atorvastatin (LIPITOR) 40 mg tablet Take 40 mg by mouth daily       ibuprofen (MOTRIN) 100 mg/5 mL suspension Take by mouth every 6 (six) hours as needed for mild pain      methylPREDNISolone 4 MG tablet therapy pack Use as directed on package (Patient not taking: Reported on 2021) 1 each 0    nicotine (NICODERM CQ) 14 mg/24hr TD 24 hr patch Place 1 patch on the skin every 24 hours      nicotine polacrilex (NICORETTE) 2 mg gum Chew 2 mg (Patient not taking: Reported on 2021)      tiZANidine (ZANAFLEX) 2 mg tablet Take 2 mg by mouth as needed        No current facility-administered medications on file prior to visit  No Known Allergies     Objective     /93   Pulse 88   Ht 5' 7" (1 702 m)   Wt 132 kg (291 lb)   BMI 45 58 kg/m²      PE:  AAOx 3  WDWN  Hearing intact, no drainage from eyes  no audible wheezing  no abdominal distension  LE compartments soft, skin intact    Ortho Exam:  left Knee:   No erythema  no swelling  no effusion  no warmth  No TTP  AROM: 0- 120  Stable to varus/valgus stress      Large joint arthrocentesis: L knee  Universal Protocol:  Consent: Verbal consent obtained    Risks and benefits: risks, benefits and alternatives were discussed  Consent given by: patient  Site marked: the operative site was marked  Supporting Documentation  Indications: pain   Procedure Details  Location: knee - L knee  Preparation: Patient was prepped and draped in the usual sterile fashion  Needle size: 22 G  Ultrasound guidance: no  Approach: anterolateral  Medications administered: 30 mg sodium hyaluronate 30 mg/2 mL  Specialty Pharmacy Supplied: received medications from pharmacy  Patient tolerance: patient tolerated the procedure well with no immediate complications  Dressing:  Sterile dressing applied

## 2021-09-15 ENCOUNTER — EVALUATION (OUTPATIENT)
Dept: PHYSICAL THERAPY | Facility: REHABILITATION | Age: 46
End: 2021-09-15
Payer: COMMERCIAL

## 2021-09-15 DIAGNOSIS — M76.61 ACHILLES TENDINITIS, RIGHT LEG: Primary | ICD-10-CM

## 2021-09-15 DIAGNOSIS — M17.0 PRIMARY OSTEOARTHRITIS OF BOTH KNEES: ICD-10-CM

## 2021-09-15 DIAGNOSIS — M25.559 HIP PAIN: ICD-10-CM

## 2021-09-15 PROCEDURE — 97163 PT EVAL HIGH COMPLEX 45 MIN: CPT | Performed by: PHYSICAL THERAPIST

## 2021-09-15 NOTE — PROGRESS NOTES
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 and notes that this is the source of her LBP  She developed right heel pain several years ago and presents with a meet deformity and has arthritis throughout her right foot  Both knees have been painful and she just finished a course of injections for the left and a steroid injection in the right  She has bilateral LBP which radiates into the left hip and down to  Just below the knee  She has put on weight recently and finds that she can't walk more than a hundred feet without needing to sit down  She would like to be more active and inquired about assistive devices  At this time I recommend a rollator walker with a seat for her and she will consult her family doctor about a script  She would benefit from a graded exercise program to increase her strength, ROM and tolerance to activity  I will evaluate her left hip and knee at her next visit      Impairments: abnormal gait, abnormal or restricted ROM, activity intolerance, impaired physical strength, lacks appropriate home exercise program, safety issue, weight-bearing intolerance and poor posture   Understanding of Dx/Px/POC: excellent   Prognosis: good  Prognosis details: Prognosis is guarded due to chronicity, obesity and multiple diagnosis    Goals  STG: in 4 weeks  Pt performing a HEP consistently  Walks safely with a walker  LTG: by discharge  Pt performing a comprehensive HEP  Pt able to do moderate housework without significant pain  Pt able to go shopping for 30 min    Plan  Patient would benefit from: skilled physical therapy  Planned modality interventions: TENS  Planned therapy interventions: neuromuscular re-education, patient education, strengthening, stretching, therapeutic exercise and home exercise program  Frequency: 2x week  Duration in weeks: 12  Treatment plan discussed with: patient        Subjective Evaluation    History of Present Illness  Mechanism of injury: Phoebe Chu is a 50 y o  female who presents  for left knee pain and OA  Patient received left knee orthovisc injection 3/3 and right knee steroid injection  Patient states the right knee injection helped  Knee has better mobility after the injection  She is not feeling any benefit of the VS injections so far  Patient states that her left knee feels unstable and locks at times as well  She is not taking anything for pain  · Cassidy Coates has two different issues with her right foot  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 insertional achilles tendinopathy  · Heel lifts, formal PT, supportive shoe wear, and a Medrol dosepak were provided to conservatively treat her achilles  · Recommended obtained a carbon fiber foot plate for additional support for her 2nd and 3rd TMT arthritis  Pt has been unable to purchase sneakers that were comfortable  She is wearing crocks to avoid pressure on her heel  · FL guided 2nd and 3rd TMT joint injections were ordered for pain relief  Advised if relief is achieve injections can be repeated every 3 months  Pt has burning pain across her back and into her left hip and down the leg past the knee  Pt has a history of a fracture of the right foot 3 years ago for which she wore a boot      Left knee and hip pain is a constant 9/10    Pain  Current pain ratin  At best pain ratin  At worst pain rating: 10  Location: Right heel pain  Aggravating factors: standing and walking    Social Support  Stairs in house: yes (to washer and dryer in basement)   Lives in: one-story house    Patient Goals  Patient goals for therapy: decreased pain and independence with ADLs/IADLs          Objective     Tenderness     Right Ankle/Foot   Tenderness in the Achilles insertion, first metatarsal head and tarsals  Additional Tenderness Details  Tender over the TMT joints    Active Range of Motion   Left Ankle/Foot   Dorsiflexion (kf): 10 degrees   Plantar flexion: 45 degrees   Inversion: 40 degrees   Eversion: 20 degrees   Great toe flexion: WFL  Great toe extension: WFL  Lesser toes: WFL    Right Ankle/Foot   Dorsiflexion (kf): 8 degrees with pain  Plantar flexion: 35 degrees with pain  Inversion: 20 degrees with pain  Eversion: 15 degrees with pain  Great toe flexion: WFL and with pain  Great toe extension: WFL and with pain  Lesser toes: WFL    Passive Range of Motion   Left Ankle/Foot    Dorsiflexion (kf): 10 degrees     Right Ankle/Foot    Dorsiflexion (kf): 8 degrees    Inversion: 25 degrees     Joint Play     Right Ankle/Foot  Joints within functional limits are the midfoot and forefoot  Hypomobile in the talocrural joint and subtalar joint  Strength/Myotome Testing     Left Knee   Flexion: 4-  Prone flexion: 4  Extension: 4    Right Knee   Flexion: 4  Prone flexion: 4  Extension: 4    Left Ankle/Foot   Dorsiflexion: 5  Inversion: 3+  Eversion: 4    Right Ankle/Foot   Dorsiflexion: 5  Plantar flexion: 2+  Eversion: 5    Additional Strength Details  Pain with testing on the left  Pain with inversion, DF and PF on the right   Pain with all testing on the left in the knee reducing full effort    Tests     Additional Tests Details  Pt did not tolerate ligament testing         Flowsheet Rows      Most Recent Value   PT/OT G-Codes   Current Score  36   Projected Score  53             Precautions: HTN, prediabetic     Daily Treatment Diary      Assessment  9/15                     Eval/Reval                       FOTO         **         **   Manuals    tape achilles and 3 over ankle NT                                               Exercise Diary                             achilles stretch w/strap 20"x3                      PF w/TB NV                      PF guzman                                                                                                                                                                                                                                                                                                                       Modalities

## 2021-09-16 ENCOUNTER — TELEPHONE (OUTPATIENT)
Dept: RADIOLOGY | Facility: MEDICAL CENTER | Age: 46
End: 2021-09-16

## 2021-09-20 ENCOUNTER — EVALUATION (OUTPATIENT)
Dept: PHYSICAL THERAPY | Facility: REHABILITATION | Age: 46
End: 2021-09-20
Payer: COMMERCIAL

## 2021-09-20 DIAGNOSIS — M17.0 PRIMARY OSTEOARTHRITIS OF BOTH KNEES: ICD-10-CM

## 2021-09-20 DIAGNOSIS — M25.559 HIP PAIN: ICD-10-CM

## 2021-09-20 DIAGNOSIS — M76.61 ACHILLES TENDINITIS, RIGHT LEG: Primary | ICD-10-CM

## 2021-09-20 PROCEDURE — 97110 THERAPEUTIC EXERCISES: CPT | Performed by: PHYSICAL THERAPIST

## 2021-09-20 PROCEDURE — 97140 MANUAL THERAPY 1/> REGIONS: CPT | Performed by: PHYSICAL THERAPIST

## 2021-09-20 NOTE — PROGRESS NOTES
Daily Note     Today's date: 2021  Patient name: Joy Higgins  : 1975  MRN: [id number]  Referring provider: Natacha Fox MD  Dx:   Encounter Diagnosis     ICD-10-CM    1  Achilles tendinitis, right leg  M76 61    2  Primary osteoarthritis of both knees  M17 0    3  Hip pain  M25 559                   Subjective: C/o sever ache in her left hip that shoots up into her back  She also notes numbness lat thigh and buttock  Has burning into the back  Objective: See treatment diary below  Hip MMT:  Hip flex: R 3  L 3  abd R: 3 L 2  Ext R: 3 L 2  Hip IR/ER R 4  L 3+      Hip ROM:  Hip IR: R 32 ,  L 25 pain  ER WNL  Flex: R 90  L 75  Abd: R 40  L 20  Ext: R 0  L 0        Knee: AROM: R 0-100  L 0-90          Assessment: Pt is very pain with hip and knee ROM on the left  SLR is positive on the left  She is significantly weak on the left as well  She would benefit from continued graded exercises to increase strength and functional mobility  She will be going for a rhizotomy to decrease LBP  She has been advised that she is a candidate for TKR but wishes to push it off  Taped her with Leukotape instead of KT to increase stability through the bottom of the foot  Plan: Continue per plan of care        Precautions: HTN, prediabetic     Daily Treatment Diary      Assessment  9/15  9/20                   Eval/Reval                       FOTO         **         **   Manuals    tape achilles and 3 over ankle NT  Leukotape for arch support I strip KT achilles                                             Exercise Diary                             achilles stretch w/strap 20"x3  20"x3                    PF w/TB NV  Pink 2x10                    PF guzman    45"x2                    LAQ     5"x10                    TA    10"x10                    TA w/ball press                        sit to stand                        supine clams Modalities

## 2021-09-23 ENCOUNTER — APPOINTMENT (OUTPATIENT)
Dept: PHYSICAL THERAPY | Facility: REHABILITATION | Age: 46
End: 2021-09-23
Payer: COMMERCIAL

## 2021-09-27 ENCOUNTER — OFFICE VISIT (OUTPATIENT)
Dept: PHYSICAL THERAPY | Facility: REHABILITATION | Age: 46
End: 2021-09-27
Payer: COMMERCIAL

## 2021-09-27 DIAGNOSIS — M25.559 HIP PAIN: ICD-10-CM

## 2021-09-27 DIAGNOSIS — M17.0 PRIMARY OSTEOARTHRITIS OF BOTH KNEES: ICD-10-CM

## 2021-09-27 DIAGNOSIS — M76.61 ACHILLES TENDINITIS, RIGHT LEG: Primary | ICD-10-CM

## 2021-09-27 PROCEDURE — 97110 THERAPEUTIC EXERCISES: CPT | Performed by: PHYSICAL THERAPIST

## 2021-09-27 PROCEDURE — 97112 NEUROMUSCULAR REEDUCATION: CPT | Performed by: PHYSICAL THERAPIST

## 2021-09-27 NOTE — PROGRESS NOTES
Daily Note     Today's date: 2021  Patient name: Cong Sy  : 1975  MRN: [id number]  Referring provider: Sudha Liao MD  Dx:   Encounter Diagnosis     ICD-10-CM    1  Achilles tendinitis, right leg  M76 61    2  Primary osteoarthritis of both knees  M17 0    3  Hip pain  M25 559                   Subjective: Pt's knee gave out this morning and she had to catch herself  She had to catch herself  Objective: See treatment diary below      Assessment: Tolerated treatment well  Exercises were challenging and caused discomfort  Standing extension reduces LBP  Patient demonstrated fatigue post treatment  Goals  STG: in 4 weeks  Pt performing a HEP consistently  Walks safely with a walker  LTG: by discharge  Pt performing a comprehensive HEP  Pt able to do moderate housework without significant pain  Pt able to go shopping for 30 min    Plan: Continue per plan of care        Precautions: HTN, prediabetic     Daily Treatment Diary      Assessment  9/15  9/20  9/27                 Eval/Reval                       FOTO         **         **   Manuals    tape achilles and 3 over ankle NT  Leukotape for arch support I strip KT achilles  NT                                           Exercise Diary                             achilles stretch w/strap 20"x3  20"x3  30"x3                  PF w/TB NV  Pink 2x10  Gr x20                  PF guzman    45"x2  45"x3                  LAQ     5"x10 5" x10                  TA    10"x10  10"x10                  TA w/ball press      10"x10                  sit to stand                        supine clams      3"x10                  quad sets     10"x10                  ball roll out      10"x10                  standing ext     x10                                                                                                                 Modalities    MH LB     10 min

## 2021-09-29 ENCOUNTER — OFFICE VISIT (OUTPATIENT)
Dept: PHYSICAL THERAPY | Facility: REHABILITATION | Age: 46
End: 2021-09-29
Payer: COMMERCIAL

## 2021-09-29 DIAGNOSIS — M17.0 PRIMARY OSTEOARTHRITIS OF BOTH KNEES: ICD-10-CM

## 2021-09-29 DIAGNOSIS — M76.61 ACHILLES TENDINITIS, RIGHT LEG: Primary | ICD-10-CM

## 2021-09-29 PROCEDURE — 97112 NEUROMUSCULAR REEDUCATION: CPT

## 2021-09-29 PROCEDURE — 97110 THERAPEUTIC EXERCISES: CPT

## 2021-09-29 NOTE — PROGRESS NOTES
Daily Note     Today's date: 2021  Patient name: Anastasiya Quinn  : 1975  MRN: [id number]  Referring provider: Peter Doss MD  Dx:   Encounter Diagnosis     ICD-10-CM    1  Achilles tendinitis, right leg  M76 61    2  Primary osteoarthritis of both knees  M17 0                   Subjective:  Patient reports that she had a sleep study done last night so she didn't sleep well and she is hurting everywhere from the positioning  Objective: See treatment diary below      Assessment: Patient tolerated treatment fair  Patient performed ex within her tolerance  Patient presented with reports of fatigue and pain post a sleep study last night  Patient demonstrated fatigue post treatment and would benefit from continued PT to address deficits and attain set goals  Plan: Continue per plan of care        Precautions: HTN, prediabetic     Daily Treatment Diary      Assessment  9/15  9/20  9/27  9/29               Eval/Reval                       FOTO         **         **   Manuals    tape achilles and 3 over ankle NT  Leukotape for arch support I strip KT achilles  NT  np                                         Exercise Diary                             achilles stretch w/strap 20"x3  20"x3  30"x3  30"x3                PF w/TB NV  Pink 2x10  Gr x20  GTB x20                PF guzman    45"x2  45"x3  45x3                LAQ     5"x10 5" x10  5"x10 b/l                TA    10"x10  10"x10  5"x10 seated                TA w/ball press      10"x10  10"  x10 seated                sit to stand                        supine clams      3"x10  seated OTB 5"x10                quad sets     10"x10  10"  x10                ball roll out      10"x10  10"  x10                standing ext     x10                                                                                                                 Modalities    MH LB     10 min  10 min

## 2021-10-04 ENCOUNTER — OFFICE VISIT (OUTPATIENT)
Dept: PHYSICAL THERAPY | Facility: REHABILITATION | Age: 46
End: 2021-10-04
Payer: COMMERCIAL

## 2021-10-04 DIAGNOSIS — M25.559 HIP PAIN: ICD-10-CM

## 2021-10-04 DIAGNOSIS — M17.0 PRIMARY OSTEOARTHRITIS OF BOTH KNEES: ICD-10-CM

## 2021-10-04 DIAGNOSIS — M76.61 ACHILLES TENDINITIS, RIGHT LEG: Primary | ICD-10-CM

## 2021-10-04 PROCEDURE — 97112 NEUROMUSCULAR REEDUCATION: CPT

## 2021-10-04 PROCEDURE — 97110 THERAPEUTIC EXERCISES: CPT

## 2021-10-04 PROCEDURE — 97140 MANUAL THERAPY 1/> REGIONS: CPT

## 2021-10-06 ENCOUNTER — APPOINTMENT (OUTPATIENT)
Dept: PHYSICAL THERAPY | Facility: REHABILITATION | Age: 46
End: 2021-10-06
Payer: COMMERCIAL

## 2021-10-11 ENCOUNTER — OFFICE VISIT (OUTPATIENT)
Dept: PHYSICAL THERAPY | Facility: REHABILITATION | Age: 46
End: 2021-10-11
Payer: COMMERCIAL

## 2021-10-11 DIAGNOSIS — M76.61 ACHILLES TENDINITIS, RIGHT LEG: Primary | ICD-10-CM

## 2021-10-11 DIAGNOSIS — M17.0 PRIMARY OSTEOARTHRITIS OF BOTH KNEES: ICD-10-CM

## 2021-10-11 DIAGNOSIS — M25.559 HIP PAIN: ICD-10-CM

## 2021-10-11 PROCEDURE — 97110 THERAPEUTIC EXERCISES: CPT | Performed by: PHYSICAL THERAPIST

## 2021-10-11 PROCEDURE — 97112 NEUROMUSCULAR REEDUCATION: CPT | Performed by: PHYSICAL THERAPIST

## 2021-10-13 ENCOUNTER — OFFICE VISIT (OUTPATIENT)
Dept: PHYSICAL THERAPY | Facility: REHABILITATION | Age: 46
End: 2021-10-13
Payer: COMMERCIAL

## 2021-10-13 DIAGNOSIS — M17.0 PRIMARY OSTEOARTHRITIS OF BOTH KNEES: ICD-10-CM

## 2021-10-13 DIAGNOSIS — M76.61 ACHILLES TENDINITIS, RIGHT LEG: Primary | ICD-10-CM

## 2021-10-13 DIAGNOSIS — M25.559 HIP PAIN: ICD-10-CM

## 2021-10-13 PROCEDURE — 97110 THERAPEUTIC EXERCISES: CPT

## 2021-10-13 PROCEDURE — 97112 NEUROMUSCULAR REEDUCATION: CPT

## 2021-10-18 ENCOUNTER — OFFICE VISIT (OUTPATIENT)
Dept: PHYSICAL THERAPY | Facility: REHABILITATION | Age: 46
End: 2021-10-18
Payer: COMMERCIAL

## 2021-10-18 DIAGNOSIS — M76.61 ACHILLES TENDINITIS, RIGHT LEG: Primary | ICD-10-CM

## 2021-10-18 DIAGNOSIS — M25.559 HIP PAIN: ICD-10-CM

## 2021-10-18 DIAGNOSIS — M17.0 PRIMARY OSTEOARTHRITIS OF BOTH KNEES: ICD-10-CM

## 2021-10-18 PROCEDURE — 97110 THERAPEUTIC EXERCISES: CPT | Performed by: PHYSICAL THERAPIST

## 2021-10-18 PROCEDURE — 97112 NEUROMUSCULAR REEDUCATION: CPT | Performed by: PHYSICAL THERAPIST

## 2021-10-20 ENCOUNTER — OFFICE VISIT (OUTPATIENT)
Dept: PHYSICAL THERAPY | Facility: REHABILITATION | Age: 46
End: 2021-10-20
Payer: COMMERCIAL

## 2021-10-20 DIAGNOSIS — M17.0 PRIMARY OSTEOARTHRITIS OF BOTH KNEES: ICD-10-CM

## 2021-10-20 DIAGNOSIS — M76.61 ACHILLES TENDINITIS, RIGHT LEG: Primary | ICD-10-CM

## 2021-10-20 DIAGNOSIS — M25.559 HIP PAIN: ICD-10-CM

## 2021-10-20 PROCEDURE — 97110 THERAPEUTIC EXERCISES: CPT | Performed by: PHYSICAL THERAPIST

## 2021-10-20 PROCEDURE — 97112 NEUROMUSCULAR REEDUCATION: CPT | Performed by: PHYSICAL THERAPIST

## 2021-10-22 ENCOUNTER — HOSPITAL ENCOUNTER (OUTPATIENT)
Dept: RADIOLOGY | Facility: MEDICAL CENTER | Age: 46
Discharge: HOME | End: 2021-10-22
Admitting: PHYSICAL MEDICINE & REHABILITATION
Payer: COMMERCIAL

## 2021-10-22 VITALS
OXYGEN SATURATION: 97 % | RESPIRATION RATE: 18 BRPM | HEART RATE: 95 BPM | SYSTOLIC BLOOD PRESSURE: 122 MMHG | DIASTOLIC BLOOD PRESSURE: 78 MMHG | TEMPERATURE: 98.6 F

## 2021-10-22 DIAGNOSIS — M47.816 LUMBAR SPONDYLOSIS: ICD-10-CM

## 2021-10-22 DIAGNOSIS — M54.50 LOW BACK PAIN: ICD-10-CM

## 2021-10-22 PROCEDURE — 64494 INJ PARAVERT F JNT L/S 2 LEV: CPT | Performed by: PHYSICAL MEDICINE & REHABILITATION

## 2021-10-22 PROCEDURE — 64493 INJ PARAVERT F JNT L/S 1 LEV: CPT | Performed by: PHYSICAL MEDICINE & REHABILITATION

## 2021-10-22 RX ORDER — AMLODIPINE BESYLATE 10 MG/1
10 TABLET ORAL DAILY
COMMUNITY
Start: 2021-09-13

## 2021-10-22 RX ORDER — BUPIVACAINE HYDROCHLORIDE 5 MG/ML
3 INJECTION, SOLUTION EPIDURAL; INTRACAUDAL ONCE
Status: COMPLETED | OUTPATIENT
Start: 2021-10-22 | End: 2021-10-22

## 2021-10-22 RX ORDER — LISINOPRIL AND HYDROCHLOROTHIAZIDE 25; 20 MG/1; MG/1
TABLET ORAL
COMMUNITY
Start: 2021-09-23

## 2021-10-22 RX ADMIN — BUPIVACAINE HYDROCHLORIDE 3 ML: 5 INJECTION, SOLUTION EPIDURAL; INTRACAUDAL at 10:07

## 2021-10-25 ENCOUNTER — TELEPHONE (OUTPATIENT)
Dept: RADIOLOGY | Facility: MEDICAL CENTER | Age: 46
End: 2021-10-25

## 2021-10-25 ENCOUNTER — TELEPHONE (OUTPATIENT)
Dept: PAIN MEDICINE | Facility: MEDICAL CENTER | Age: 46
End: 2021-10-25

## 2021-10-25 ENCOUNTER — OFFICE VISIT (OUTPATIENT)
Dept: PHYSICAL THERAPY | Facility: REHABILITATION | Age: 46
End: 2021-10-25
Payer: COMMERCIAL

## 2021-10-25 DIAGNOSIS — M76.61 ACHILLES TENDINITIS, RIGHT LEG: Primary | ICD-10-CM

## 2021-10-25 DIAGNOSIS — M25.559 HIP PAIN: ICD-10-CM

## 2021-10-25 DIAGNOSIS — M17.0 PRIMARY OSTEOARTHRITIS OF BOTH KNEES: ICD-10-CM

## 2021-10-25 PROCEDURE — 97112 NEUROMUSCULAR REEDUCATION: CPT

## 2021-10-25 PROCEDURE — 97110 THERAPEUTIC EXERCISES: CPT

## 2021-10-25 NOTE — TELEPHONE ENCOUNTER
Pt called stating that she needs an order for aqua therapy for her back and a chiropractor   Pt states she received a letter from her insurance stating the RFA will not be paid for unless she had PT and or chriopratic care completed           Pt can be reached at [phone]

## 2021-10-27 ENCOUNTER — APPOINTMENT (OUTPATIENT)
Dept: PHYSICAL THERAPY | Facility: REHABILITATION | Age: 46
End: 2021-10-27
Payer: COMMERCIAL

## 2021-10-27 ENCOUNTER — HOSPITAL ENCOUNTER (OUTPATIENT)
Dept: RADIOLOGY | Facility: HOSPITAL | Age: 46
Discharge: HOME | End: 2021-10-27
Attending: ORTHOPAEDIC SURGERY | Admitting: RADIOLOGY
Payer: COMMERCIAL

## 2021-10-27 DIAGNOSIS — M19.079 ARTHRITIS OF MIDFOOT: ICD-10-CM

## 2021-10-27 PROCEDURE — 20605 DRAIN/INJ JOINT/BURSA W/O US: CPT

## 2021-10-27 PROCEDURE — 77002 NEEDLE LOCALIZATION BY XRAY: CPT

## 2021-10-27 RX ORDER — LIDOCAINE HYDROCHLORIDE 10 MG/ML
5 INJECTION, SOLUTION EPIDURAL; INFILTRATION; INTRACAUDAL; PERINEURAL
Status: DISCONTINUED | OUTPATIENT
Start: 2021-10-27 | End: 2021-10-28 | Stop reason: HOSPADM

## 2021-10-27 RX ADMIN — IOHEXOL 1 ML: 300 INJECTION, SOLUTION INTRAVENOUS at 09:18

## 2021-10-27 NOTE — TELEPHONE ENCOUNTER
RN s/w pt and pt stated that shw has been going to PT for her foot knees and hips since 9/15 and her physical therapist stated that they should send the notes that are in the computer so far due to the physical therapist also doing stretches for her back while she is there and that may help get the RFA approved      Pt is also going to start Aqua therapy for her back and did drop off her pain diary this AM     --please see previous--  Physical therapist thought the PT notes since 9/15 could help get the RFA approved

## 2021-10-28 ENCOUNTER — TELEPHONE (OUTPATIENT)
Dept: OBGYN CLINIC | Facility: MEDICAL CENTER | Age: 46
End: 2021-10-28

## 2021-11-01 ENCOUNTER — EVALUATION (OUTPATIENT)
Dept: PHYSICAL THERAPY | Age: 46
End: 2021-11-01
Payer: COMMERCIAL

## 2021-11-01 DIAGNOSIS — M54.50 CHRONIC BILATERAL LOW BACK PAIN WITHOUT SCIATICA: Primary | ICD-10-CM

## 2021-11-01 DIAGNOSIS — G89.29 CHRONIC BILATERAL LOW BACK PAIN WITHOUT SCIATICA: Primary | ICD-10-CM

## 2021-11-01 PROCEDURE — 97161 PT EVAL LOW COMPLEX 20 MIN: CPT | Performed by: PHYSICAL THERAPIST

## 2021-11-03 ENCOUNTER — OFFICE VISIT (OUTPATIENT)
Dept: PHYSICAL THERAPY | Age: 46
End: 2021-11-03
Payer: COMMERCIAL

## 2021-11-03 DIAGNOSIS — M54.50 CHRONIC BILATERAL LOW BACK PAIN WITHOUT SCIATICA: Primary | ICD-10-CM

## 2021-11-03 DIAGNOSIS — M25.559 HIP PAIN: ICD-10-CM

## 2021-11-03 DIAGNOSIS — M17.0 PRIMARY OSTEOARTHRITIS OF BOTH KNEES: ICD-10-CM

## 2021-11-03 DIAGNOSIS — G89.29 CHRONIC BILATERAL LOW BACK PAIN WITHOUT SCIATICA: Primary | ICD-10-CM

## 2021-11-03 DIAGNOSIS — M76.61 ACHILLES TENDINITIS, RIGHT LEG: ICD-10-CM

## 2021-11-03 PROCEDURE — 97113 AQUATIC THERAPY/EXERCISES: CPT | Performed by: SPECIALIST/TECHNOLOGIST

## 2021-11-08 ENCOUNTER — OFFICE VISIT (OUTPATIENT)
Dept: PAIN MEDICINE | Facility: MEDICAL CENTER | Age: 46
End: 2021-11-08
Payer: COMMERCIAL

## 2021-11-08 VITALS
WEIGHT: 288 LBS | BODY MASS INDEX: 45.2 KG/M2 | HEART RATE: 86 BPM | TEMPERATURE: 97.8 F | HEIGHT: 67 IN | DIASTOLIC BLOOD PRESSURE: 80 MMHG | SYSTOLIC BLOOD PRESSURE: 134 MMHG

## 2021-11-08 DIAGNOSIS — M47.816 LUMBAR SPONDYLOSIS: Primary | ICD-10-CM

## 2021-11-08 DIAGNOSIS — M25.532 PAIN OF BOTH WRIST JOINTS: ICD-10-CM

## 2021-11-08 DIAGNOSIS — M25.531 PAIN OF BOTH WRIST JOINTS: ICD-10-CM

## 2021-11-08 DIAGNOSIS — M54.50 CHRONIC BILATERAL LOW BACK PAIN WITHOUT SCIATICA: ICD-10-CM

## 2021-11-08 DIAGNOSIS — G89.29 CHRONIC BILATERAL LOW BACK PAIN WITHOUT SCIATICA: ICD-10-CM

## 2021-11-08 PROCEDURE — 99214 OFFICE O/P EST MOD 30 MIN: CPT | Performed by: NURSE PRACTITIONER

## 2021-11-09 ENCOUNTER — OFFICE VISIT (OUTPATIENT)
Dept: PHYSICAL THERAPY | Age: 46
End: 2021-11-09
Payer: COMMERCIAL

## 2021-11-09 DIAGNOSIS — M54.50 CHRONIC BILATERAL LOW BACK PAIN WITHOUT SCIATICA: Primary | ICD-10-CM

## 2021-11-09 DIAGNOSIS — G89.29 CHRONIC BILATERAL LOW BACK PAIN WITHOUT SCIATICA: Primary | ICD-10-CM

## 2021-11-09 DIAGNOSIS — M76.61 ACHILLES TENDINITIS, RIGHT LEG: ICD-10-CM

## 2021-11-09 DIAGNOSIS — M25.559 HIP PAIN: ICD-10-CM

## 2021-11-09 DIAGNOSIS — M17.0 PRIMARY OSTEOARTHRITIS OF BOTH KNEES: ICD-10-CM

## 2021-11-09 PROCEDURE — 97113 AQUATIC THERAPY/EXERCISES: CPT | Performed by: SPECIALIST/TECHNOLOGIST

## 2021-11-10 ENCOUNTER — OFFICE VISIT (OUTPATIENT)
Dept: PHYSICAL THERAPY | Age: 46
End: 2021-11-10
Payer: COMMERCIAL

## 2021-11-10 DIAGNOSIS — M54.50 CHRONIC BILATERAL LOW BACK PAIN WITHOUT SCIATICA: Primary | ICD-10-CM

## 2021-11-10 DIAGNOSIS — G89.29 CHRONIC BILATERAL LOW BACK PAIN WITHOUT SCIATICA: Primary | ICD-10-CM

## 2021-11-10 PROCEDURE — 97113 AQUATIC THERAPY/EXERCISES: CPT | Performed by: PHYSICAL THERAPIST

## 2021-11-15 ENCOUNTER — APPOINTMENT (OUTPATIENT)
Dept: PHYSICAL THERAPY | Age: 46
End: 2021-11-15
Payer: COMMERCIAL

## 2021-11-17 ENCOUNTER — OFFICE VISIT (OUTPATIENT)
Dept: PHYSICAL THERAPY | Age: 46
End: 2021-11-17
Payer: COMMERCIAL

## 2021-11-17 DIAGNOSIS — M54.50 CHRONIC BILATERAL LOW BACK PAIN WITHOUT SCIATICA: Primary | ICD-10-CM

## 2021-11-17 DIAGNOSIS — M25.559 HIP PAIN: ICD-10-CM

## 2021-11-17 DIAGNOSIS — G89.29 CHRONIC BILATERAL LOW BACK PAIN WITHOUT SCIATICA: Primary | ICD-10-CM

## 2021-11-17 DIAGNOSIS — M76.61 ACHILLES TENDINITIS, RIGHT LEG: ICD-10-CM

## 2021-11-17 DIAGNOSIS — M17.0 PRIMARY OSTEOARTHRITIS OF BOTH KNEES: ICD-10-CM

## 2021-11-17 PROCEDURE — 97113 AQUATIC THERAPY/EXERCISES: CPT | Performed by: SPECIALIST/TECHNOLOGIST

## 2021-11-22 ENCOUNTER — OFFICE VISIT (OUTPATIENT)
Dept: PHYSICAL THERAPY | Age: 46
End: 2021-11-22
Payer: COMMERCIAL

## 2021-11-22 DIAGNOSIS — G89.29 CHRONIC BILATERAL LOW BACK PAIN WITHOUT SCIATICA: Primary | ICD-10-CM

## 2021-11-22 DIAGNOSIS — M54.50 CHRONIC BILATERAL LOW BACK PAIN WITHOUT SCIATICA: Primary | ICD-10-CM

## 2021-11-22 PROCEDURE — 97113 AQUATIC THERAPY/EXERCISES: CPT | Performed by: PHYSICAL THERAPIST

## 2021-11-24 ENCOUNTER — OFFICE VISIT (OUTPATIENT)
Dept: PHYSICAL THERAPY | Age: 46
End: 2021-11-24
Payer: COMMERCIAL

## 2021-11-24 DIAGNOSIS — G89.29 CHRONIC BILATERAL LOW BACK PAIN WITHOUT SCIATICA: Primary | ICD-10-CM

## 2021-11-24 DIAGNOSIS — M54.50 CHRONIC BILATERAL LOW BACK PAIN WITHOUT SCIATICA: Primary | ICD-10-CM

## 2021-11-24 PROCEDURE — 97113 AQUATIC THERAPY/EXERCISES: CPT | Performed by: PHYSICAL THERAPIST

## 2021-11-29 ENCOUNTER — TELEPHONE (OUTPATIENT)
Dept: PAIN MEDICINE | Facility: MEDICAL CENTER | Age: 46
End: 2021-11-29

## 2021-11-29 ENCOUNTER — OFFICE VISIT (OUTPATIENT)
Dept: PHYSICAL THERAPY | Age: 46
End: 2021-11-29
Payer: COMMERCIAL

## 2021-11-29 DIAGNOSIS — G89.29 CHRONIC BILATERAL LOW BACK PAIN WITHOUT SCIATICA: Primary | ICD-10-CM

## 2021-11-29 DIAGNOSIS — M54.50 CHRONIC BILATERAL LOW BACK PAIN WITHOUT SCIATICA: Primary | ICD-10-CM

## 2021-11-29 DIAGNOSIS — M76.61 ACHILLES TENDINITIS, RIGHT LEG: ICD-10-CM

## 2021-11-29 DIAGNOSIS — M17.0 PRIMARY OSTEOARTHRITIS OF BOTH KNEES: ICD-10-CM

## 2021-11-29 DIAGNOSIS — M25.559 HIP PAIN: ICD-10-CM

## 2021-11-29 PROCEDURE — 97113 AQUATIC THERAPY/EXERCISES: CPT | Performed by: SPECIALIST/TECHNOLOGIST

## 2021-12-01 ENCOUNTER — OFFICE VISIT (OUTPATIENT)
Dept: PHYSICAL THERAPY | Age: 46
End: 2021-12-01
Payer: COMMERCIAL

## 2021-12-01 DIAGNOSIS — M54.50 CHRONIC BILATERAL LOW BACK PAIN WITHOUT SCIATICA: Primary | ICD-10-CM

## 2021-12-01 DIAGNOSIS — G89.29 CHRONIC BILATERAL LOW BACK PAIN WITHOUT SCIATICA: Primary | ICD-10-CM

## 2021-12-01 DIAGNOSIS — M17.0 PRIMARY OSTEOARTHRITIS OF BOTH KNEES: ICD-10-CM

## 2021-12-01 DIAGNOSIS — M76.61 ACHILLES TENDINITIS, RIGHT LEG: ICD-10-CM

## 2021-12-01 DIAGNOSIS — M25.559 HIP PAIN: ICD-10-CM

## 2021-12-01 PROCEDURE — 97113 AQUATIC THERAPY/EXERCISES: CPT | Performed by: SPECIALIST/TECHNOLOGIST

## 2021-12-07 ENCOUNTER — OFFICE VISIT (OUTPATIENT)
Dept: OBGYN CLINIC | Facility: MEDICAL CENTER | Age: 46
End: 2021-12-07
Payer: COMMERCIAL

## 2021-12-07 VITALS
SYSTOLIC BLOOD PRESSURE: 129 MMHG | BODY MASS INDEX: 45.04 KG/M2 | WEIGHT: 287 LBS | HEIGHT: 67 IN | DIASTOLIC BLOOD PRESSURE: 84 MMHG | HEART RATE: 88 BPM

## 2021-12-07 DIAGNOSIS — M17.0 PRIMARY OSTEOARTHRITIS OF KNEES, BILATERAL: Primary | ICD-10-CM

## 2021-12-07 PROCEDURE — 20610 DRAIN/INJ JOINT/BURSA W/O US: CPT | Performed by: PHYSICIAN ASSISTANT

## 2021-12-07 PROCEDURE — 99213 OFFICE O/P EST LOW 20 MIN: CPT | Performed by: PHYSICIAN ASSISTANT

## 2021-12-07 RX ORDER — BUPIVACAINE HYDROCHLORIDE 5 MG/ML
3.5 INJECTION, SOLUTION PERINEURAL
Status: COMPLETED | OUTPATIENT
Start: 2021-12-07 | End: 2021-12-07

## 2021-12-07 RX ORDER — METHYLPREDNISOLONE ACETATE 40 MG/ML
1 INJECTION, SUSPENSION INTRA-ARTICULAR; INTRALESIONAL; INTRAMUSCULAR; SOFT TISSUE
Status: COMPLETED | OUTPATIENT
Start: 2021-12-07 | End: 2021-12-07

## 2021-12-07 RX ADMIN — METHYLPREDNISOLONE ACETATE 1 ML: 40 INJECTION, SUSPENSION INTRA-ARTICULAR; INTRALESIONAL; INTRAMUSCULAR; SOFT TISSUE at 12:54

## 2021-12-07 RX ADMIN — BUPIVACAINE HYDROCHLORIDE 3.5 ML: 5 INJECTION, SOLUTION PERINEURAL at 12:54

## 2021-12-22 ENCOUNTER — OFFICE VISIT (OUTPATIENT)
Dept: PAIN MEDICINE | Facility: MEDICAL CENTER | Age: 46
End: 2021-12-22
Payer: COMMERCIAL

## 2021-12-22 VITALS
HEIGHT: 67 IN | HEART RATE: 91 BPM | WEIGHT: 288 LBS | SYSTOLIC BLOOD PRESSURE: 134 MMHG | BODY MASS INDEX: 45.2 KG/M2 | TEMPERATURE: 98.5 F | DIASTOLIC BLOOD PRESSURE: 74 MMHG

## 2021-12-22 DIAGNOSIS — M47.816 LUMBAR SPONDYLOSIS: Primary | ICD-10-CM

## 2021-12-22 DIAGNOSIS — M17.12 PRIMARY OSTEOARTHRITIS OF LEFT KNEE: ICD-10-CM

## 2021-12-22 DIAGNOSIS — G89.29 CHRONIC BILATERAL LOW BACK PAIN WITHOUT SCIATICA: ICD-10-CM

## 2021-12-22 DIAGNOSIS — M54.50 CHRONIC BILATERAL LOW BACK PAIN WITHOUT SCIATICA: ICD-10-CM

## 2021-12-22 PROCEDURE — 99213 OFFICE O/P EST LOW 20 MIN: CPT | Performed by: NURSE PRACTITIONER

## 2021-12-22 RX ORDER — GABAPENTIN 300 MG/1
300 CAPSULE ORAL 3 TIMES DAILY
Qty: 90 CAPSULE | Refills: 0 | Status: SHIPPED | OUTPATIENT
Start: 2021-12-22 | End: 2022-02-23 | Stop reason: ALTCHOICE

## 2021-12-22 RX ORDER — DULOXETIN HYDROCHLORIDE 30 MG/1
30 CAPSULE, DELAYED RELEASE ORAL DAILY
Qty: 30 CAPSULE | Refills: 0 | Status: SHIPPED | OUTPATIENT
Start: 2021-12-22 | End: 2022-02-23 | Stop reason: SDUPTHER

## 2022-02-23 ENCOUNTER — OFFICE VISIT (OUTPATIENT)
Dept: PAIN MEDICINE | Facility: MEDICAL CENTER | Age: 47
End: 2022-02-23
Payer: COMMERCIAL

## 2022-02-23 ENCOUNTER — TELEPHONE (OUTPATIENT)
Dept: PAIN MEDICINE | Facility: MEDICAL CENTER | Age: 47
End: 2022-02-23

## 2022-02-23 VITALS
TEMPERATURE: 97.7 F | HEART RATE: 94 BPM | WEIGHT: 293 LBS | DIASTOLIC BLOOD PRESSURE: 72 MMHG | BODY MASS INDEX: 45.99 KG/M2 | OXYGEN SATURATION: 96 % | SYSTOLIC BLOOD PRESSURE: 118 MMHG | HEIGHT: 67 IN

## 2022-02-23 DIAGNOSIS — M47.816 LUMBAR SPONDYLOSIS: Primary | ICD-10-CM

## 2022-02-23 DIAGNOSIS — M54.50 CHRONIC BILATERAL LOW BACK PAIN WITHOUT SCIATICA: ICD-10-CM

## 2022-02-23 DIAGNOSIS — G89.29 CHRONIC BILATERAL LOW BACK PAIN WITHOUT SCIATICA: ICD-10-CM

## 2022-02-23 DIAGNOSIS — M17.12 PRIMARY OSTEOARTHRITIS OF LEFT KNEE: ICD-10-CM

## 2022-02-23 PROCEDURE — 99213 OFFICE O/P EST LOW 20 MIN: CPT | Performed by: NURSE PRACTITIONER

## 2022-02-23 RX ORDER — DULOXETIN HYDROCHLORIDE 30 MG/1
30 CAPSULE, DELAYED RELEASE ORAL DAILY
Qty: 30 CAPSULE | Refills: 2 | Status: SHIPPED | OUTPATIENT
Start: 2022-02-23 | End: 2022-02-23 | Stop reason: SDUPTHER

## 2022-02-23 RX ORDER — DULOXETIN HYDROCHLORIDE 30 MG/1
30 CAPSULE, DELAYED RELEASE ORAL DAILY
Qty: 90 CAPSULE | Refills: 0 | Status: SHIPPED | OUTPATIENT
Start: 2022-02-23 | End: 2022-05-09 | Stop reason: SDUPTHER

## 2022-02-23 RX ORDER — LIDOCAINE 50 MG/G
1 PATCH TOPICAL DAILY
Qty: 90 PATCH | Refills: 0 | Status: SHIPPED | OUTPATIENT
Start: 2022-02-23 | End: 2022-05-24

## 2022-02-23 RX ORDER — PREGABALIN 75 MG/1
75 CAPSULE ORAL 3 TIMES DAILY
Qty: 270 CAPSULE | Refills: 0 | Status: SHIPPED | OUTPATIENT
Start: 2022-02-23 | End: 2022-02-28

## 2022-02-23 NOTE — TELEPHONE ENCOUNTER
Insurance company: [employer]   Member ID: [id number]  Insurance company phone:  [phone]    Lidocaine 5% patch     Voltaren 1% gel

## 2022-02-23 NOTE — PROGRESS NOTES
Assessment  1  Lumbar spondylosis    2  Chronic bilateral low back pain without sciatica    3  Primary osteoarthritis of left knee        Plan  Continue duloxetine this was refilled today  Initiate lidocaine patches    Initiate Lyrica 75 mg 3 times daily as she did not get any relief from gabapentin  Follow-up visit in 12 weeks for medication refills        My impressions and treatment recommendations were discussed in detail with the patient who verbalized understanding and had no further questions  Discharge instructions were provided  I personally saw and examined the patient and I agree with the above discussed plan of care  No orders of the defined types were placed in this encounter  New Medications Ordered This Visit   Medications    pregabalin (LYRICA) 75 mg capsule     Sig: Take 1 capsule (75 mg total) by mouth 3 (three) times a day     Dispense:  270 capsule     Refill:  0    DULoxetine (CYMBALTA) 30 mg delayed release capsule     Sig: Take 1 capsule (30 mg total) by mouth daily     Dispense:  90 capsule     Refill:  0    lidocaine (Lidoderm) 5 %     Sig: Apply 1 patch topically daily Remove & Discard patch within 12 hours or as directed by MD     Dispense:  90 patch     Refill:  0       History of Present Illness    Karl Horne is a 48 y o  female presents for follow-up related to her chronic low back and leg pain symptoms today she rates her pain 10/10 she tells me that her pain symptoms have worsened since we last saw her  She has constant pain throughout the entirety of the day described as burning, sharp, throbbing, cramping, and numbness  Patient was taking duloxetine 30 mg daily along with gabapentin 300 mg 3 times daily however she ran out of the medication as we have not seen her because she has been having a lot of issues with her lungs    She tells me she was getting very little relief from these medications together and she did not feel like the gabapentin provided her any relief at all  She is no longer participating in aqua therapy however she tells me it was something that she enjoyed while she was there doing it however the pain relief did not last longer than an hour  She has an upcoming appointment with Rheumatology in April for her multiple areas of joint pain  Patient tells me that she does have sleep apnea and she is finally getting her CPAP machine in about 5 days  Patient tells me that she is very limited in her daily activities due to her pain symptoms  I have personally reviewed and/or updated the patient's past medical history, past surgical history, family history, social history, current medications, allergies, and vital signs today  Review of Systems   Respiratory: Negative for shortness of breath  Cardiovascular: Positive for chest pain  Gastrointestinal: Positive for nausea  Negative for constipation, diarrhea and vomiting  Musculoskeletal: Positive for arthralgias, back pain and gait problem  Negative for joint swelling and myalgias  Skin: Negative for rash  Neurological: Positive for dizziness and weakness  Negative for seizures  All other systems reviewed and are negative        Patient Active Problem List   Diagnosis    Tear of medial meniscus of left knee, current    Primary osteoarthritis of left knee    Pyelonephritis    Wheezing    Class 3 severe obesity with body mass index (BMI) of 40 0 to 44 9 in adult ([facility] [address] )    Tobacco use    Sepsis ([facility] [address] [facility]    Chronic bilateral low back pain without sciatica    Lumbar spondylosis    Primary osteoarthritis of knees, bilateral       Past Medical History:   Diagnosis Date    Arthritis     Chronic pain     Fibromyalgia, primary     Headache(784 0) Always    Hiatal hernia     High cholesterol     History of transfusion     Years ago    Hypertension        Past Surgical History:   Procedure Laterality Date     SECTION      CHOLECYSTECTOMY      FL GUIDED NEEDLE PLAC BX/ASP/INJ  10/27/2021    HERNIA REPAIR      KIDNEY SURGERY      LIVER SURGERY         Family History   Problem Relation Age of Onset    Cancer Mother     Diabetes Father     Heart disease Father     Cancer Father        Social History     Occupational History    Not on file   Tobacco Use    Smoking status: Current Every Day Smoker     Packs/day: 0 50     Years: 0 00     Pack years: 0 00     Types: Cigarettes    Smokeless tobacco: Never Used    Tobacco comment: in process of quitting 9/7/21   Vaping Use    Vaping Use: Never used   Substance and Sexual Activity    Alcohol use: No    Drug use: No    Sexual activity: Not on file       Current Outpatient Medications on File Prior to Visit   Medication Sig    acetaminophen (TYLENOL) 325 mg tablet Take 650 mg by mouth every 6 (six) hours as needed for mild pain    albuterol (PROVENTIL HFA,VENTOLIN HFA) 90 mcg/act inhaler Inhale 2 puffs as needed     amLODIPine (NORVASC) 10 mg tablet Take 10 mg by mouth daily    atorvastatin (LIPITOR) 40 mg tablet Take 40 mg by mouth daily     budesonide-formoterol (Symbicort) 160-4 5 mcg/act inhaler Inhale 2 puffs 2 (two) times a day    lisinopril-hydrochlorothiazide (PRINZIDE,ZESTORETIC) 20-25 MG per tablet TAKE 1 TABLET BY MOUTH DAILY   PLEASE NOTE DOSE CHANGE    tiZANidine (ZANAFLEX) 2 mg tablet Take 2 mg by mouth as needed     Diclofenac Sodium (VOLTAREN) 1 % Apply 2 g topically 4 (four) times a day (Patient not taking: Reported on 2/23/2022 )    fluticasone-salmeterol (Advair) 250-50 mcg/dose inhaler Inhale 1 puff 2 (two) times a day (Patient not taking: Reported on 12/22/2021 )    ibuprofen (MOTRIN) 100 mg/5 mL suspension Take by mouth every 6 (six) hours as needed for mild pain    nicotine (NICODERM CQ) 14 mg/24hr TD 24 hr patch Place 1 patch on the skin every 24 hours    [DISCONTINUED] DULoxetine (CYMBALTA) 30 mg delayed release capsule Take 1 capsule (30 mg total) by mouth daily (Patient not taking: Reported on 2/23/2022 )    [DISCONTINUED] gabapentin (NEURONTIN) 300 mg capsule Take 1 capsule (300 mg total) by mouth 3 (three) times a day (Patient not taking: Reported on 2/23/2022 )     No current facility-administered medications on file prior to visit  No Known Allergies    Physical Exam    /72   Pulse 94   Temp 97 7 °F (36 5 °C)   Ht 5' 7" (1 702 m)   Wt 133 kg (293 lb)   SpO2 96%   BMI 45 89 kg/m²     Constitutional: normal, well developed, well nourished, alert, in no distress and non-toxic and no overt pain behavior    Endomorphic body habitus  Eyes: anicteric  HEENT: grossly intact  Neck: supple, symmetric, trachea midline and no masses   Pulmonary:even and unlabored  Cardiovascular:No edema or pitting edema present  Skin:Normal without rashes or lesions and well hydrated  Psychiatric:Mood and affect appropriate  Neurologic:Cranial Nerves II-XII grossly intact  Musculoskeletal:normal    Imaging

## 2022-02-25 ENCOUNTER — TELEPHONE (OUTPATIENT)
Dept: PAIN MEDICINE | Facility: MEDICAL CENTER | Age: 47
End: 2022-02-25

## 2022-02-25 NOTE — TELEPHONE ENCOUNTER
Pt called stating that the lyrica and duloxetine are to expensive and would like to know if anything else can be given  Pt is not sure if there is anything that  can be done for the price   She is not getting anywhere with [facility] and would like to know if she needs a prior auth     Pt can be reached at [phone]

## 2022-02-25 NOTE — TELEPHONE ENCOUNTER
RN s/w pt regarding previous  Per pt the duloxetine and the lyrica were very expensive and she is unable to afford this pricing  RN then spoke with the pharmacist and per the pharmacist she may have a deductible that she is working with  Pt is going to call her insurance company again and see if this is the case  In the mean time the pt stated she never had to pay anything t=for the gabapentin that she was on in the past 300mg tid, wondering if this would be an option to go back on maybe at a higher dose if AO thinks this could be helpful  --please advise thank you--  Can the pt go back on the gabapentin at a higher dose than 900 mg a day?

## 2022-02-28 NOTE — TELEPHONE ENCOUNTER
Benjamin Aiken, she would have to work up to that slowly   I have heard that if you fill lyrica at [facility] with good rx it is $25

## 2022-02-28 NOTE — TELEPHONE ENCOUNTER
S/w pt  Pt is being fitted for CPAP, unable to send directions through [employer]   Pt will CB in am

## 2022-02-28 NOTE — TELEPHONE ENCOUNTER
Gabapentin sent to pharmacy  I want her to work up to 2 tablets three times daily    Titration schedule  Day 1-3: 1 tablet at bedtime  Day4-7: 1 tablet twice daily  Day 8-11: 1 tablet three times daily  Days 11 and 12 : 1 AM, 1 afternoon, 2 PM  Day 13 and 14: 2AM, 1 afternoon and 2 PM  Day 15: start 2 tablets three times daily and stay on this dose

## 2022-02-28 NOTE — TELEPHONE ENCOUNTER
RN s/w pt regarding previous  Pt would like to try that titrating dose of gabapentin and is aware that she would increase by one every threee days until at the 900mg and then give it 2 weeks until notice some relief of pain  Per pt she did fill the duloxetine  And she is getting her sleep apnea machine today  --please advise thank you--  [facility]?

## 2022-03-01 NOTE — TELEPHONE ENCOUNTER
Patient returning nurses phone call has medication  She has an iphone and is unable to set up voicemail something is wrong with it

## 2022-03-01 NOTE — TELEPHONE ENCOUNTER
S/w pt and advised of titration instructions  Pt verbalized understanding and was advised not to drive or operate machinery until she knows how the medication will effect her

## 2022-03-01 NOTE — TELEPHONE ENCOUNTER
Attempted to reach pt x2  Received a message the wireless customer you are trying to reach is not available now  Please try again later  No option to leave a VM

## 2022-04-04 ENCOUNTER — TELEPHONE (OUTPATIENT)
Dept: RHEUMATOLOGY | Facility: CLINIC | Age: 47
End: 2022-04-04

## 2022-04-04 ENCOUNTER — OFFICE VISIT (OUTPATIENT)
Dept: RHEUMATOLOGY | Facility: CLINIC | Age: 47
End: 2022-04-04
Payer: COMMERCIAL

## 2022-04-04 ENCOUNTER — APPOINTMENT (OUTPATIENT)
Dept: LAB | Facility: MEDICAL CENTER | Age: 47
End: 2022-04-04
Payer: COMMERCIAL

## 2022-04-04 VITALS
HEART RATE: 106 BPM | BODY MASS INDEX: 45.99 KG/M2 | HEIGHT: 67 IN | SYSTOLIC BLOOD PRESSURE: 138 MMHG | WEIGHT: 293 LBS | DIASTOLIC BLOOD PRESSURE: 87 MMHG

## 2022-04-04 DIAGNOSIS — M17.12 OSTEOARTHRITIS OF LEFT KNEE, UNSPECIFIED OSTEOARTHRITIS TYPE: ICD-10-CM

## 2022-04-04 DIAGNOSIS — M70.62 GREATER TROCHANTERIC BURSITIS OF LEFT HIP: ICD-10-CM

## 2022-04-04 DIAGNOSIS — G89.29 CHRONIC BILATERAL LOW BACK PAIN WITHOUT SCIATICA: ICD-10-CM

## 2022-04-04 DIAGNOSIS — L85.3 DRY SKIN DERMATITIS: ICD-10-CM

## 2022-04-04 DIAGNOSIS — E55.9 VITAMIN D DEFICIENCY: ICD-10-CM

## 2022-04-04 DIAGNOSIS — M25.532 PAIN OF BOTH WRIST JOINTS: Primary | ICD-10-CM

## 2022-04-04 DIAGNOSIS — M25.50 POLYARTHRALGIA: ICD-10-CM

## 2022-04-04 DIAGNOSIS — M47.816 LUMBAR SPONDYLOSIS: ICD-10-CM

## 2022-04-04 DIAGNOSIS — M54.50 CHRONIC BILATERAL LOW BACK PAIN WITHOUT SCIATICA: ICD-10-CM

## 2022-04-04 DIAGNOSIS — M25.531 PAIN OF BOTH WRIST JOINTS: Primary | ICD-10-CM

## 2022-04-04 LAB
25(OH)D3 SERPL-MCNC: 6.8 NG/ML (ref 30–100)
ALBUMIN SERPL BCP-MCNC: 3.8 G/DL (ref 3.5–5)
ALP SERPL-CCNC: 130 U/L (ref 46–116)
ALT SERPL W P-5'-P-CCNC: 69 U/L (ref 12–78)
ANION GAP SERPL CALCULATED.3IONS-SCNC: 8 MMOL/L (ref 4–13)
AST SERPL W P-5'-P-CCNC: 58 U/L (ref 5–45)
BASOPHILS # BLD AUTO: 0.07 THOUSANDS/ΜL (ref 0–0.1)
BASOPHILS NFR BLD AUTO: 1 % (ref 0–1)
BILIRUB SERPL-MCNC: 0.3 MG/DL (ref 0.2–1)
BUN SERPL-MCNC: 11 MG/DL (ref 5–25)
CALCIUM SERPL-MCNC: 9.5 MG/DL (ref 8.3–10.1)
CHLORIDE SERPL-SCNC: 107 MMOL/L (ref 100–108)
CO2 SERPL-SCNC: 21 MMOL/L (ref 21–32)
CREAT SERPL-MCNC: 0.95 MG/DL (ref 0.6–1.3)
CRP SERPL QL: 17.9 MG/L
EOSINOPHIL # BLD AUTO: 0.22 THOUSAND/ΜL (ref 0–0.61)
EOSINOPHIL NFR BLD AUTO: 2 % (ref 0–6)
ERYTHROCYTE [DISTWIDTH] IN BLOOD BY AUTOMATED COUNT: 15.8 % (ref 11.6–15.1)
ERYTHROCYTE [SEDIMENTATION RATE] IN BLOOD: 56 MM/HOUR (ref 0–19)
GFR SERPL CREATININE-BSD FRML MDRD: 71 ML/MIN/1.73SQ M
GLUCOSE P FAST SERPL-MCNC: 112 MG/DL (ref 65–99)
HCT VFR BLD AUTO: 44.1 % (ref 34.8–46.1)
HGB BLD-MCNC: 14.6 G/DL (ref 11.5–15.4)
IMM GRANULOCYTES # BLD AUTO: 0.05 THOUSAND/UL (ref 0–0.2)
IMM GRANULOCYTES NFR BLD AUTO: 0 % (ref 0–2)
LYMPHOCYTES # BLD AUTO: 1.85 THOUSANDS/ΜL (ref 0.6–4.47)
LYMPHOCYTES NFR BLD AUTO: 17 % (ref 14–44)
MCH RBC QN AUTO: 31.7 PG (ref 26.8–34.3)
MCHC RBC AUTO-ENTMCNC: 33.1 G/DL (ref 31.4–37.4)
MCV RBC AUTO: 96 FL (ref 82–98)
MONOCYTES # BLD AUTO: 0.8 THOUSAND/ΜL (ref 0.17–1.22)
MONOCYTES NFR BLD AUTO: 7 % (ref 4–12)
NEUTROPHILS # BLD AUTO: 8.17 THOUSANDS/ΜL (ref 1.85–7.62)
NEUTS SEG NFR BLD AUTO: 73 % (ref 43–75)
NRBC BLD AUTO-RTO: 0 /100 WBCS
PLATELET # BLD AUTO: 375 THOUSANDS/UL (ref 149–390)
PMV BLD AUTO: 9.5 FL (ref 8.9–12.7)
POTASSIUM SERPL-SCNC: 4.1 MMOL/L (ref 3.5–5.3)
PROT SERPL-MCNC: 7.8 G/DL (ref 6.4–8.2)
RBC # BLD AUTO: 4.6 MILLION/UL (ref 3.81–5.12)
SODIUM SERPL-SCNC: 136 MMOL/L (ref 136–145)
WBC # BLD AUTO: 11.16 THOUSAND/UL (ref 4.31–10.16)

## 2022-04-04 PROCEDURE — 86235 NUCLEAR ANTIGEN ANTIBODY: CPT | Performed by: INTERNAL MEDICINE

## 2022-04-04 PROCEDURE — 86430 RHEUMATOID FACTOR TEST QUAL: CPT | Performed by: INTERNAL MEDICINE

## 2022-04-04 PROCEDURE — 85025 COMPLETE CBC W/AUTO DIFF WBC: CPT | Performed by: INTERNAL MEDICINE

## 2022-04-04 PROCEDURE — 82306 VITAMIN D 25 HYDROXY: CPT | Performed by: INTERNAL MEDICINE

## 2022-04-04 PROCEDURE — 81374 HLA I TYPING 1 ANTIGEN LR: CPT | Performed by: INTERNAL MEDICINE

## 2022-04-04 PROCEDURE — 20610 DRAIN/INJ JOINT/BURSA W/O US: CPT | Performed by: INTERNAL MEDICINE

## 2022-04-04 PROCEDURE — 36415 COLL VENOUS BLD VENIPUNCTURE: CPT | Performed by: INTERNAL MEDICINE

## 2022-04-04 PROCEDURE — 86038 ANTINUCLEAR ANTIBODIES: CPT | Performed by: INTERNAL MEDICINE

## 2022-04-04 PROCEDURE — 86200 CCP ANTIBODY: CPT | Performed by: INTERNAL MEDICINE

## 2022-04-04 PROCEDURE — 80053 COMPREHEN METABOLIC PANEL: CPT | Performed by: INTERNAL MEDICINE

## 2022-04-04 PROCEDURE — 85652 RBC SED RATE AUTOMATED: CPT | Performed by: INTERNAL MEDICINE

## 2022-04-04 PROCEDURE — 99203 OFFICE O/P NEW LOW 30 MIN: CPT | Performed by: INTERNAL MEDICINE

## 2022-04-04 PROCEDURE — 86140 C-REACTIVE PROTEIN: CPT | Performed by: INTERNAL MEDICINE

## 2022-04-04 RX ORDER — PREGABALIN 75 MG/1
75 CAPSULE ORAL 3 TIMES DAILY
Qty: 270 CAPSULE | Refills: 1 | Status: SHIPPED | OUTPATIENT
Start: 2022-04-04 | End: 2022-05-09 | Stop reason: SDUPTHER

## 2022-04-04 RX ORDER — LIDOCAINE HYDROCHLORIDE 10 MG/ML
1 INJECTION, SOLUTION INFILTRATION; PERINEURAL ONCE
Status: COMPLETED | OUTPATIENT
Start: 2022-04-04 | End: 2022-04-04

## 2022-04-04 RX ORDER — DICLOFENAC SODIUM 75 MG/1
75 TABLET, DELAYED RELEASE ORAL 2 TIMES DAILY
Qty: 60 TABLET | Refills: 6 | Status: SHIPPED | OUTPATIENT
Start: 2022-04-04

## 2022-04-04 RX ORDER — TRIAMCINOLONE ACETONIDE 40 MG/ML
40 INJECTION, SUSPENSION INTRA-ARTICULAR; INTRAMUSCULAR ONCE
Status: COMPLETED | OUTPATIENT
Start: 2022-04-04 | End: 2022-04-04

## 2022-04-04 RX ADMIN — TRIAMCINOLONE ACETONIDE 40 MG: 40 INJECTION, SUSPENSION INTRA-ARTICULAR; INTRAMUSCULAR at 10:23

## 2022-04-04 RX ADMIN — TRIAMCINOLONE ACETONIDE 40 MG: 40 INJECTION, SUSPENSION INTRA-ARTICULAR; INTRAMUSCULAR at 10:21

## 2022-04-04 RX ADMIN — LIDOCAINE HYDROCHLORIDE 1 ML: 10 INJECTION, SOLUTION INFILTRATION; PERINEURAL at 10:24

## 2022-04-04 RX ADMIN — LIDOCAINE HYDROCHLORIDE 1 ML: 10 INJECTION, SOLUTION INFILTRATION; PERINEURAL at 10:19

## 2022-04-04 NOTE — PATIENT INSTRUCTIONS
Do labs  Start Lyrica 75mg three times a day; stop gabapentin  Continue Cymbalta at bedtime  Stop ibuprofen; can take Tylenol  Start diclofenac twice a day with food, as needed for joint pain  Try hydrocortisone ointment for dry skin on hands  Left knee and left hip bursa injections given in clinic today  Schedule EMG    Return to clinic in 6 months    Hip Bursitis Exercises   AMBULATORY CARE:   Hip bursitis exercises  help strengthen the muscles in your hip and keep the joint flexible  Strong muscles can help reduce pain, prevent injury, and keep the joint stable  The exercises can also help increase the range of motion in your hip joint  What you need to know about exercise safety:   · Move slowly and smoothly  Avoid fast or jerky motions  This will help prevent an injury  · Breathe normally  Do not hold your breath  It is important to breathe in and out so you do not tense up during exercise  Tension could prevent you from moving your joint in a full range of motion  · Do the exercises and stretches on both legs  Do this so both hips remain strong and flexible  · Stop if you feel sharp pain or an increase in pain  Contact your healthcare provider or physical therapist  It is normal to feel some discomfort during exercise  Regular exercise will help decrease your discomfort over time  · Warm up before you stretch and exercise  Walk or ride a stationary bike for 5 to 10 minutes  How to do hip stretches: Your healthcare provider or physical therapist will tell you how many times to do each stretch  Do the stretch on both sides before you move to the next stretch  · Standing iliotibial band stretch:  Stand with the leg on your injured side behind your other leg  Bend sideways toward the side that is not injured  Stop when you feel a stretch in your outer hip  Hold for 5 to 10 seconds  Then return to the starting position  · Lying iliotibial band stretch:  Lie on your back   Bend the knee on your injured side toward your chest  Place your hands on the outside of your knee and thigh  Slowly pull the knee across your body  Stop when you feel a stretch in your hip and outer thigh  Hold for 5 to 10 seconds  Return your leg to the starting position  · Hip stretch:  Lie on your back with both legs straight and on the ground  Bend the knee on your injured side toward your chest until you can reach your lower leg  Place both hands on your shin and pull your knee toward your chest  Hold for 5 to 10 seconds  Return your leg to the starting position  · Knee to chest:  Lie on your back with both knees bent and feet flat on the floor  Bend the knee on your injured side toward your chest until you can reach your lower leg  Place both hands on your shin and pull your knee toward your chest  Hold for 5 to 10 seconds  Return your leg to the starting position  · Internal hip rotator stretch:  You will do this exercise on a table  Lie on your side with the injured hip on top  You may be told to keep a pillow between your thighs  Move the top leg so the foot hangs below the edge of the table  Rotate your hip to raise your foot in the opposite direction of the bottom shoulder  Raise your foot as high as you can so you feel a stretch in the back of your thigh  Hold for 5 seconds  Then slowly lower your foot to the starting position  · External hip rotator stretch:  You will do this exercise on a table  Lie on your side with the injured hip on the bottom  You do not need a pillow between your thighs for this exercise  Move the bottom leg so the foot is off the edge of the table  Rotate your hip to lift the foot in the opposite direction of the bottom shoulder  Raise your foot as high as you can so you feel a stretch in your buttock  Hold for 5 seconds  Then slowly lower your foot to the starting position  · Kneeling hip flexor stretch:  Kneel on your knee on the injured side   Place the foot of your other leg on the floor so the knee is bent  Put both hands on top of your thigh  Keep your back straight and abdominal muscles tight  Lean forward until you feel a stretch in your other thigh  Hold the stretch for 10 seconds  Return to the starting position  How to do hip strengthening exercises: Your healthcare provider or physical therapist will tell you how many times to do each exercise  Do the exercise on both sides before you move to the next exercise  · Straight leg lift to the side: This may also be called hip abduction  Lie on your side with straight legs, with the injured hip on top  Slowly raise your top leg toward the ceiling as high as you can  Keep your foot pointed  Hold for 5 seconds  Then slowly lower your leg to the starting position  · Inner thigh lift: This may also be called hip adduction  Lie on your side with straight legs, with the injured hip on the bottom  Cross your top leg over your bottom leg  Put the foot of your top leg on the floor in front of you  Raise your bottom leg until it touches the top leg  Hold for 5 seconds  Then slowly lower the leg to the floor  · Clam exercise:  Lie on your side so your injured side is on top  Bend your knees  Keep your heels together during this exercise  Slowly raise your top knee toward the ceiling  Then lower your leg so your knees are together  Call your doctor if:   · You have sharp pain during exercise or at rest     · You have questions or concerns about the stretches or exercises  © Copyright [email or web address] 2022 Information is for End User's use only and may not be sold, redistributed or otherwise used for commercial purposes  All illustrations and images included in CareNotes® are the copyrighted property of [employer]  or [employer]  The above information is an  only  It is not intended as medical advice for individual conditions or treatments   Talk to your doctor, nurse or pharmacist before following any medical regimen to see if it is safe and effective for you  Arthritis   AMBULATORY CARE:   Arthritis  is pain or disease in one or more joints  There are many types of arthritis  Types such as rheumatoid arthritis cause inflammation in the joints  Other types wear away the cartilage between joints, such as osteoarthritis  This makes the bones of the joint rub together when you move the joint  An infection from bacteria, a virus, or a fungus can also cause arthritis  Your symptoms may be constant, or symptoms may come and go  Arthritis often gets worse over time and can cause permanent joint damage  Common signs and symptoms of arthritis:   · Pain, swelling, or stiffness in the joint    · Limited range of motion in the joint    · Warmth or redness over the joint    · Tenderness when you touch the joint    · Stiff joints in the morning that loosen with movement    · A creaking or grinding sound when you move the joint    · Fever    Call your doctor or rheumatologist if:   · You have a fever and severe joint pain or swelling  · You cannot move the affected joint  · You have severe joint pain you cannot tolerate  · You have a new or worsening rash  · Your pain or swelling does not get better with treatment  · You have questions or concerns about your condition or care  Treatment  will depend on the type of arthritis you have and if it is severe  You may need any of the following:  · Acetaminophen  decreases pain and fever  It is available without a doctor's order  Ask how much to take and how often to take it  Follow directions  Read the labels of all other medicines you are using to see if they also contain acetaminophen, or ask your doctor or pharmacist  Acetaminophen can cause liver damage if not taken correctly  Do not use more than 4 grams (4,000 milligrams) total of acetaminophen in one day       · NSAIDs , such as ibuprofen, help decrease swelling, pain, and fever  This medicine is available with or without a doctor's order  NSAIDs can cause stomach bleeding or kidney problems in certain people  If you take blood thinner medicine, always ask your healthcare provider if NSAIDs are safe for you  Always read the medicine label and follow directions  · Steroid medicine  helps reduce swelling and pain  · Prescription pain medicine  may be given  Ask your healthcare provider how to take this medicine safely  Some prescription pain medicines contain acetaminophen  Do not take other medicines that contain acetaminophen without talking to your healthcare provider  Too much acetaminophen may cause liver damage  Prescription pain medicine may cause constipation  Ask your healthcare provider how to prevent or treat constipation  · Surgery  may be needed to repair or replace a damaged joint  Manage your symptoms:   · Rest your painful joint so it can heal   Your healthcare provider may recommend crutches or a walker if the affected joint is in a leg  · Apply ice or heat to the joint  Both can help decrease swelling and pain  Ice may also help prevent tissue damage  Use an ice pack, or put crushed ice in a plastic bag  Cover it with a towel and place it on your joint for 15 to 20 minutes every hour or as directed  You can apply heat for 20 minutes every 2 hours  Heat treatment includes hot packs or heat lamps  · Elevate your joint  Elevation helps reduce swelling and pain  Raise your joint above the level of your heart as often as you can  Prop your painful joint on pillows to keep it above your heart comfortably  Manage arthritis:   · Talk to your healthcare providers about your arthritis medicines  Some medicines may only be needed when you have arthritis pain  You may need to take other medicines every day to prevent arthritis from getting worse  Your healthcare providers will help you understand all your medicines and when to take them   It is important to take the medicines as directed, even if you start to feel better  You can continue to have joint damage and inflammation even if you do not feel it  · Eat a variety of healthy foods  Healthy foods include fruits, vegetables, whole-grain breads, low-fat dairy products, beans, lean meats, and fish  Ask if you need to be on a special diet  A diet rich in calcium and vitamin D may decrease your risk of osteoporosis  Foods high in calcium include milk, cheese, broccoli, and tofu  Vitamin D may be found in meat, fish, fortified milk, cereal and bread  Ask if you need calcium or vitamin D supplements  · Go to physical or occupational therapy as directed  A physical therapist can teach you exercises to improve flexibility and range of motion  You may also be shown non-weight-bearing exercises that are safe for your joints, such as swimming  Exercise can help keep your joints flexible and reduce pain  An occupational therapist can help you learn to do your daily activities when your joints are stiff or sore  · Maintain a healthy weight  Extra weight puts increased pressure on your joints  Ask your healthcare provider what you should weigh  If you need to lose weight, he or she can help you create a weight loss program  Weight loss can help reduce pain and increase your ability to do your activities  · Wear flat or low-heeled shoes  This will help decrease pain and reduce pressure on your ankle, knee, and hip joints  · Do not smoke  Nicotine and other chemicals in cigarettes and cigars can damage your bones and joints  Ask your healthcare provider for information if you currently smoke and need help to quit  E-cigarettes or smokeless tobacco still contain nicotine  Talk to your healthcare provider before you use these products  Support devices:   · Orthotic shoes or insoles  help support your feet when you walk      · Crutches, a cane, or a walker  may help decrease your risk for falling  They also decrease stress on affected joints  · Devices to prevent falls  include raised toilet seats and bathtub bars to help you get up from sitting  Handrails can be placed in areas where you need balance and support  · Devices to help with support and rest  include splints to wear on your hands and a firm pillow while you sleep  Use a pillow that is firm enough to support your neck and head  Follow up with your healthcare provider or rheumatologist as directed:  Write down your questions so you remember to ask them during your visits  © Copyright [employer] 2022 Information is for End User's use only and may not be sold, redistributed or otherwise used for commercial purposes  All illustrations and images included in CareNotes® are the copyrighted property of [employer]  or [employer] Sal[employer]  The above information is an  only  It is not intended as medical advice for individual conditions or treatments  Talk to your doctor, nurse or pharmacist before following any medical regimen to see if it is safe and effective for you

## 2022-04-04 NOTE — PROGRESS NOTES
Assessment and Plan:   Nirmala Brunson is a 48 y o   female who presents as a Rheumatology consult referred by Elvin Joseph for evaluation of polyarthralgia  Autoimmune disease workup has been unremarkable; her inflammatory markers are elevated, which is nonspecific  Do labs  Start Lyrica 75mg three times a day; stop gabapentin  Continue Cymbalta at bedtime  Stop ibuprofen; can take Tylenol  Start diclofenac twice a day with food, as needed for joint pain  Try hydrocortisone ointment for dry skin on hands  Left knee and left hip bursa injections given in clinic today  Schedule EMG    Return to clinic in 6 months    Plan:  Diagnoses and all orders for this visit:    Pain of both wrist joints  -     Ambulatory referral to Rheumatology  -     diclofenac (VOLTAREN) 75 mg EC tablet; Take 1 tablet (75 mg total) by mouth 2 (two) times a day  -     EMG 2 Limb Upper Extremity; Future    Polyarthralgia  -     CBC and differential  -     Comprehensive metabolic panel  -     C-reactive protein  -     Sedimentation rate, automated  -     Vitamin D 25 hydroxy  -     HLA-B27 antigen  -     Cyclic citrul peptide antibody, IgG  -     RF Screen w/ Reflex to Titer  -     JOSE Screen w/ Reflex to Titer/Pattern  -     diclofenac (VOLTAREN) 75 mg EC tablet; Take 1 tablet (75 mg total) by mouth 2 (two) times a day  -     Anti-Lizzeth 1 Antibody    Chronic bilateral low back pain without sciatica  -     pregabalin (LYRICA) 75 mg capsule; Take 1 capsule (75 mg total) by mouth 3 (three) times a day    Lumbar spondylosis  -     pregabalin (LYRICA) 75 mg capsule; Take 1 capsule (75 mg total) by mouth 3 (three) times a day  -     diclofenac (VOLTAREN) 75 mg EC tablet; Take 1 tablet (75 mg total) by mouth 2 (two) times a day    Dry skin dermatitis  -     hydrocortisone 2 5 % ointment;  Apply topically 2 (two) times a day    Osteoarthritis of left knee, unspecified osteoarthritis type  -     lidocaine (XYLOCAINE) 1 % injection 1 mL  - triamcinolone acetonide (KENALOG-40) 40 mg/mL injection 40 mg    Greater trochanteric bursitis of left hip  -     lidocaine (XYLOCAINE) 1 % injection 1 mL  -     triamcinolone acetonide (KENALOG-40) 40 mg/mL injection 40 mg    Large joint arthrocentesis: L knee  Universal Protocol:  Consent: Verbal consent obtained  Written consent not obtained  Risks and benefits: risks, benefits and alternatives were discussed  Consent given by: patient  Time out: Immediately prior to procedure a "time out" was called to verify the correct patient, procedure, equipment, support staff and site/side marked as required  Timeout called at: 4/4/2022 9:30 AM   Patient understanding: patient states understanding of the procedure being performed  Patient consent: the patient's understanding of the procedure matches consent given  Procedure consent: procedure consent matches procedure scheduled  Relevant documents: relevant documents present and verified  Test results: test results available and properly labeled  Site marked: the operative site was marked  Radiology Images displayed and confirmed  If images not available, report reviewed: imaging studies available  Required items: required blood products, implants, devices, and special equipment available  Patient identity confirmed: verbally with patient    Supporting Documentation  Indications: pain   Procedure Details  Location: knee - L knee  Preparation: Patient was prepped and draped in the usual sterile fashion  Needle size: 25 G  Ultrasound guidance: no  Approach: anterolateral    Patient tolerance: patient tolerated the procedure well with no immediate complications  Dressing:  Sterile dressing applied    After discussing the risks/benefits of left knee steroid injection, including minor risk of infection, bleeding, pain, or ineffectiveness, informed consent was obtained and patient was agreeable to proceed    Using sterile technique, the left knee was prepped with Chloraprep, and was topically anesthetized with Ethyl Chloride  The joint was entered using an anterolateral approach and Kenalog 40 mg and 1% lidocaine 1 mL were then injected and the needle withdrawn  The procedure was well tolerated  Patient was instructed to contact our office with any concerns or questions  Large joint arthrocentesis: L greater trochanteric bursa  Universal Protocol:  Consent: Verbal consent obtained  Written consent not obtained  Risks and benefits: risks, benefits and alternatives were discussed  Consent given by: patient  Time out: Immediately prior to procedure a "time out" was called to verify the correct patient, procedure, equipment, support staff and site/side marked as required  Timeout called at: 4/4/2022 9:30 AM   Patient understanding: patient states understanding of the procedure being performed  Patient consent: the patient's understanding of the procedure matches consent given  Procedure consent: procedure consent matches procedure scheduled  Relevant documents: relevant documents present and verified  Test results: test results available and properly labeled  Site marked: the operative site was marked  Radiology Images displayed and confirmed   If images not available, report reviewed: imaging studies available  Required items: required blood products, implants, devices, and special equipment available  Patient identity confirmed: verbally with patient    Supporting Documentation  Indications: pain   Procedure Details  Location: hip - L greater trochanteric bursa  Preparation: Patient was prepped and draped in the usual sterile fashion  Needle size: 25 G  Ultrasound guidance: no  Approach: lateral    Patient tolerance: patient tolerated the procedure well with no immediate complications  Dressing:  Sterile dressing applied    After discussing the risks/benefits of left trochanteric bursa steroid injection, including minor risk of infection, bleeding, pain, or ineffectiveness, informed consent was obtained and patient was agreeable to proceed  Using sterile technique, the left hip bursa area was prepped with Chloraprep, and was topically anesthetized with Ethyl Chloride  The bursa was entered using a lateral approach and Kenalog 40 mg and 1% lidocaine 1 mL were then injected and the needle withdrawn  The procedure was well tolerated  Patient was instructed to contact our office with any concerns or questions  Follow-up plan: RTC in 6 months        HPI  Maria Eugenia Rivera is a 48 y o   female who presents as a Rheumatology consult referred by Asuncion Alonso for evaluation of possible autoimmune disease  Has a 35-year old handicapped child  Up until she was 20 y o , was lifting her  20 years ago, was diagnosed with fibromyalgia mainly affecting her back  Always had back pain; has pain in body constantly all day and night  Wakes up in the morning very stiff; struggles to stand  Gets burning pain between her shoulders  Is starting to not be able to  things to open  Overall, pain is the worst in the morning  Unable to sit longer than 25 minutes  Feet, knees, and hands get visibly swollen; right hand gets more tingly and numb than  Has been getting hyperpigmentation over knuckles for the past couple of months  Unable to walk long distances without pain  Takes gabapentin 600mg three times a day and duloxetine 30mg at bedtime; does not feel the medications are helping  Low back, hips, thighs, and knees bother her  Has done physical therapy and water therapy; water therapy helped while she was doing it  Has COPD and sleep apnea; is down to 5 cigarettes a day  Takes ibuprofen 800mg 3 times a day; takes Tylenol as well  Pain management injections in back helped some     Review of Systems  Review of Systems   Constitutional: Positive for fatigue  Negative for fever and unexpected weight change  HENT: Negative for mouth sores      Respiratory: Positive for cough, shortness of breath and wheezing  Cardiovascular: Positive for leg swelling  Negative for chest pain  Gastrointestinal: Negative for abdominal pain, constipation and diarrhea  Endocrine: Positive for polydipsia  Musculoskeletal: Positive for arthralgias, back pain, joint swelling, myalgias and neck pain  Skin: Negative for color change and rash  Neurological: Positive for weakness, numbness and headaches  Hematological: Negative for adenopathy  Psychiatric/Behavioral: Negative for sleep disturbance  Reviewed and agree  Allergies  No Known Allergies    Home Medications    Current Outpatient Medications:     acetaminophen (TYLENOL) 325 mg tablet, Take 650 mg by mouth every 6 (six) hours as needed for mild pain, Disp: , Rfl:     albuterol (PROVENTIL HFA,VENTOLIN HFA) 90 mcg/act inhaler, Inhale 2 puffs as needed , Disp: , Rfl:     amLODIPine (NORVASC) 10 mg tablet, Take 10 mg by mouth daily, Disp: , Rfl:     atorvastatin (LIPITOR) 40 mg tablet, Take 40 mg by mouth daily , Disp: , Rfl:     budesonide-formoterol (Symbicort) 160-4 5 mcg/act inhaler, Inhale 2 puffs 2 (two) times a day, Disp: , Rfl:     DULoxetine (CYMBALTA) 30 mg delayed release capsule, Take 1 capsule (30 mg total) by mouth daily, Disp: 90 capsule, Rfl: 0    lisinopril-hydrochlorothiazide (PRINZIDE,ZESTORETIC) 20-25 MG per tablet, TAKE 1 TABLET BY MOUTH DAILY   PLEASE NOTE DOSE CHANGE, Disp: , Rfl:     tiZANidine (ZANAFLEX) 2 mg tablet, Take 2 mg by mouth as needed , Disp: , Rfl:     diclofenac (VOLTAREN) 75 mg EC tablet, Take 1 tablet (75 mg total) by mouth 2 (two) times a day, Disp: 60 tablet, Rfl: 6    Diclofenac Sodium (VOLTAREN) 1 %, Apply 2 g topically 4 (four) times a day (Patient not taking: Reported on 2/23/2022 ), Disp: 2 g, Rfl: 2    fluticasone-salmeterol (Advair) 250-50 mcg/dose inhaler, Inhale 1 puff 2 (two) times a day (Patient not taking: Reported on 12/22/2021 ), Disp: , Rfl:     hydrocortisone 2 5 % ointment, Apply topically 2 (two) times a day, Disp: 30 g, Rfl: 3    lidocaine (Lidoderm) 5 %, Apply 1 patch topically daily Remove & Discard patch within 12 hours or as directed by MD, Disp: 90 patch, Rfl: 0    pregabalin (LYRICA) 75 mg capsule, Take 1 capsule (75 mg total) by mouth 3 (three) times a day, Disp: 270 capsule, Rfl: 1    Past Medical History  Past Medical History:   Diagnosis Date    Arthritis     Chronic pain     Fibromyalgia, primary     Headache(784 0) Always    Hiatal hernia     High cholesterol     History of transfusion     Years ago    Hypertension        Past Surgical History   Past Surgical History:   Procedure Laterality Date     SECTION      CHOLECYSTECTOMY      FL GUIDED NEEDLE PLAC BX/ASP/INJ  10/27/2021    HERNIA REPAIR      KIDNEY SURGERY      LIVER SURGERY         Family History    Family History   Problem Relation Age of Onset    Cancer Mother     Diabetes Father     Heart disease Father     Cancer Father    mother - osteoporosis  Brother - DELIA    Social History  Occupation: unable to work due to the pain currently, not working for the past 3 years  Social History     Substance and Sexual Activity   Alcohol Use No     Social History     Substance and Sexual Activity   Drug Use No     Social History     Tobacco Use   Smoking Status Current Every Day Smoker    Packs/day: 0 50    Years: 0 00    Pack years: 0 00    Types: Cigarettes   Smokeless Tobacco Never Used   Tobacco Comment    in process of quitting 21       Objective:  Vitals:    22 0909   BP: 138/87   Pulse: (!) 106   Weight: 135 kg (297 lb)   Height: 5' 7" (1 702 m)       Physical Exam  Constitutional:       General: She is not in acute distress  HENT:      Head: Normocephalic and atraumatic  Eyes:      Conjunctiva/sclera: Conjunctivae normal    Cardiovascular:      Rate and Rhythm: Normal rate and regular rhythm  Heart sounds: S1 normal and S2 normal  No friction rub     Pulmonary:      Effort: Pulmonary effort is normal  No respiratory distress  Breath sounds: Normal breath sounds  No wheezing, rhonchi or rales  Musculoskeletal:         General: Tenderness present  Cervical back: Neck supple  Comments: Thoracic spine tenderness; L SI joint, L knee, and L trochanteric bursa tenderness   Skin:     General: Skin is warm and dry  Coloration: Skin is not pale  Findings: No rash  Comments: Knuckles dry   Neurological:      Mental Status: She is alert  Mental status is at baseline  Psychiatric:         Mood and Affect: Mood normal          Behavior: Behavior normal          Reviewed labs and imaging  Imaging:   XR right foot and ankle 9/7/21  Prominent plantar and retrocalcaneal spurs noted  1st MTP and midfoot degenerative change noted  There is mild hallux valgus deformity  XR lumbar spine 8/5/21  Grade 1-2 anterolisthesis at L4-5, which shows no significant change with flexion and extension       Moderate disc space narrowing at L4-5  Moderate to severe disc space narrowing at L5-S1  Advanced facet degenerative changes bilaterally at L4-5 and L5-S1  CXR 4/4/21  No acute cardiopulmonary disease  XR left knee 2/26/21  Tricompartmental osteoarthritis with severe narrowing of the medial tibiofemoral joint space and osteophytes seen in all 3 compartments  There is mild genu varus  XR right foot 1/19/21  Moderate hallux valgus deformity  Mild 1st MTP joint arthrosis  Moderate size plantar and dorsal calcaneal spurs      Labs:   Office Visit on 04/04/2022   Component Date Value Ref Range Status    WBC 04/04/2022 11 16* 4 31 - 10 16 Thousand/uL Final    RBC 04/04/2022 4 60  3 81 - 5 12 Million/uL Final    Hemoglobin 04/04/2022 14 6  11 5 - 15 4 g/dL Final    Hematocrit 04/04/2022 44 1  34 8 - 46 1 % Final    MCV 04/04/2022 96  82 - 98 fL Final    MCH 04/04/2022 31 7  26 8 - 34 3 pg Final    MCHC 04/04/2022 33 1  31 4 - 37 4 g/dL Final    RDW 04/04/2022 15 8* 11 6 - 15 1 % Final    MPV 04/04/2022 9 5  8 9 - 12 7 fL Final    Platelets 87/89/0203 375  149 - 390 Thousands/uL Final    nRBC 04/04/2022 0  /100 WBCs Final    Neutrophils Relative 04/04/2022 73  43 - 75 % Final    Immat GRANS % 04/04/2022 0  0 - 2 % Final    Lymphocytes Relative 04/04/2022 17  14 - 44 % Final    Monocytes Relative 04/04/2022 7  4 - 12 % Final    Eosinophils Relative 04/04/2022 2  0 - 6 % Final    Basophils Relative 04/04/2022 1  0 - 1 % Final    Neutrophils Absolute 04/04/2022 8 17* 1 85 - 7 62 Thousands/µL Final    Immature Grans Absolute 04/04/2022 0 05  0 00 - 0 20 Thousand/uL Final    Lymphocytes Absolute 04/04/2022 1 85  0 60 - 4 47 Thousands/µL Final    Monocytes Absolute 04/04/2022 0 80  0 17 - 1 22 Thousand/µL Final    Eosinophils Absolute 04/04/2022 0 22  0 00 - 0 61 Thousand/µL Final    Basophils Absolute 04/04/2022 0 07  0 00 - 0 10 Thousands/µL Final    Sodium 04/04/2022 136  136 - 145 mmol/L Final    Potassium 04/04/2022 4 1  3 5 - 5 3 mmol/L Final    Chloride 04/04/2022 107  100 - 108 mmol/L Final    CO2 04/04/2022 21  21 - 32 mmol/L Final    ANION GAP 04/04/2022 8  4 - 13 mmol/L Final    BUN 04/04/2022 11  5 - 25 mg/dL Final    Creatinine 04/04/2022 0 95  0 60 - 1 30 mg/dL Final    Standardized to IDMS reference method    Glucose, Fasting 04/04/2022 112* 65 - 99 mg/dL Final    Specimen collection should occur prior to Sulfasalazine administration due to the potential for falsely depressed results  Specimen collection should occur prior to Sulfapyridine administration due to the potential for falsely elevated results   Calcium 04/04/2022 9 5  8 3 - 10 1 mg/dL Final    AST 04/04/2022 58* 5 - 45 U/L Final    Specimen collection should occur prior to Sulfasalazine administration due to the potential for falsely depressed results       ALT 04/04/2022 69  12 - 78 U/L Final    Specimen collection should occur prior to Sulfasalazine and/or Sulfapyridine administration due to the potential for falsely depressed results   Alkaline Phosphatase 04/04/2022 130* 46 - 116 U/L Final    Total Protein 04/04/2022 7 8  6 4 - 8 2 g/dL Final    Albumin 04/04/2022 3 8  3 5 - 5 0 g/dL Final    Total Bilirubin 04/04/2022 0 30  0 20 - 1 00 mg/dL Final    Use of this assay is not recommended for patients undergoing treatment with eltrombopag due to the potential for falsely elevated results   eGFR 04/04/2022 71  ml/min/1 73sq m Final    CRP 04/04/2022 17 9* <3 0 mg/L Final    Sed Rate 04/04/2022 56* 0 - 19 mm/hour Final    Vit D, 25-Hydroxy 04/04/2022 6 8* 30 0 - 100 0 ng/mL Final    HLA B27 04/04/2022 Negative   Final    HLA-B*27 Negative  B27 allele interpretation for all loci based on IMGT/HLA  database version 3 44  This test was developed and its performance characteristics  determined by [facility]   It has not been cleared or approved  by the Food and Drug Administration  [facility] CLIA ID Number [id number]  This test was performed using PCR (Polymerase Chain Reaction)/SSOP  (Sequence Specific Oligonucleotide Probes) technique  SBT (Sequence  Based Typing) and/or SSP (Sequence Specific Primers) may be used as  supplemental methods when necessary  Please contact [facility] Customer  Service at [phone] if you have any questions     Director of [facility] Laboratory   Dr Aranza Collado, PhD    Cyclic Citrullinated Peptide Ab  04/04/2022 0 9  See comment Final    Rheumatoid Factor 04/04/2022 Negative  Negative Final    JOSE 04/04/2022 Negative  Negative Final    Anti HARRIS-1 04/04/2022 <0 2  0 0 - 0 9 AI Final

## 2022-04-05 LAB
CCP AB SER IA-ACNC: 0.9
RHEUMATOID FACT SER QL LA: NEGATIVE

## 2022-04-06 LAB
ENA JO1 AB SER-ACNC: <0.2 AI (ref 0–0.9)
RYE IGE QN: NEGATIVE

## 2022-04-11 LAB — HLA-B27 QL NAA+PROBE: NEGATIVE

## 2022-04-17 ENCOUNTER — TELEPHONE (OUTPATIENT)
Dept: RHEUMATOLOGY | Facility: CLINIC | Age: 47
End: 2022-04-17

## 2022-04-17 RX ORDER — ERGOCALCIFEROL 1.25 MG/1
50000 CAPSULE ORAL WEEKLY
Qty: 12 CAPSULE | Refills: 1 | Status: SHIPPED | OUTPATIENT
Start: 2022-04-17

## 2022-04-17 NOTE — TELEPHONE ENCOUNTER
Please let pt know that her Vit  D level is very low, and that I sent a high dose Vit   D pill to her pharmacy to start taking once a week

## 2022-04-18 NOTE — TELEPHONE ENCOUNTER
Unable to contact through phone number in chart  Left voice mail at patient's 's phone number for her to call back/update phone number

## 2022-05-09 ENCOUNTER — OFFICE VISIT (OUTPATIENT)
Dept: PAIN MEDICINE | Facility: MEDICAL CENTER | Age: 47
End: 2022-05-09
Payer: COMMERCIAL

## 2022-05-09 VITALS
WEIGHT: 293 LBS | HEIGHT: 67 IN | BODY MASS INDEX: 45.99 KG/M2 | DIASTOLIC BLOOD PRESSURE: 86 MMHG | HEART RATE: 101 BPM | SYSTOLIC BLOOD PRESSURE: 138 MMHG

## 2022-05-09 DIAGNOSIS — G89.29 CHRONIC BILATERAL LOW BACK PAIN WITHOUT SCIATICA: ICD-10-CM

## 2022-05-09 DIAGNOSIS — M54.50 CHRONIC BILATERAL LOW BACK PAIN WITHOUT SCIATICA: ICD-10-CM

## 2022-05-09 DIAGNOSIS — M47.816 LUMBAR SPONDYLOSIS: Primary | ICD-10-CM

## 2022-05-09 DIAGNOSIS — M17.0 PRIMARY OSTEOARTHRITIS OF KNEES, BILATERAL: ICD-10-CM

## 2022-05-09 PROCEDURE — 99213 OFFICE O/P EST LOW 20 MIN: CPT | Performed by: NURSE PRACTITIONER

## 2022-05-09 RX ORDER — PREGABALIN 100 MG/1
100 CAPSULE ORAL 3 TIMES DAILY
Qty: 90 CAPSULE | Refills: 2 | Status: SHIPPED | OUTPATIENT
Start: 2022-05-09 | End: 2022-07-25 | Stop reason: SDUPTHER

## 2022-05-09 RX ORDER — DULOXETIN HYDROCHLORIDE 30 MG/1
60 CAPSULE, DELAYED RELEASE ORAL DAILY
Qty: 180 CAPSULE | Refills: 2 | Status: SHIPPED | OUTPATIENT
Start: 2022-05-09 | End: 2022-05-16

## 2022-05-09 NOTE — PROGRESS NOTES
Assessment  1  Lumbar spondylosis    2  Primary osteoarthritis of knees, bilateral    3  Chronic bilateral low back pain without sciatica        Plan  Continue duloxetine and increase the dose to 60 mg daily  This medication was sent to her pharmacy  Continue Lyrica and increase to 100 mg 3 times daily  [facility]  I will also reach out to [facility] so that she can set up an appointment with the patient to discuss coping mechanisms for chronic pain as the patient tells me she is starting to feel very depressed and having anxiety due to her pain symptoms  Patient to return in our office in 3 months for medication refills      My impressions and treatment recommendations were discussed in detail with the patient who verbalized understanding and had no further questions  Discharge instructions were provided  I personally saw and examined the patient and I agree with the above discussed plan of care  No orders of the defined types were placed in this encounter  New Medications Ordered This Visit   Medications    DULoxetine (CYMBALTA) 30 mg delayed release capsule     Sig: Take 2 capsules (60 mg total) by mouth daily     Dispense:  180 capsule     Refill:  2    pregabalin (LYRICA) 100 mg capsule     Sig: Take 1 capsule (100 mg total) by mouth 3 (three) times a day     Dispense:  90 capsule     Refill:  2       History of Present Illness    Jyoti Douglas is a 48 y o  female presents for follow-up related to her chronic back pain symptoms  Today she rates her pain 9/10 this is constant bothersome throughout the entirety of the day  She describes the pain as burning, throbbing, and numbness  Patient also has pain in her left hip and in her knees  She is taking duloxetine 30 mg daily, Lyrica 75 mg 3 times daily with no relief  She denies side effects or issues      Patient did see Rheumatology they did provide her with a knee injection a bursa injection in the office she had some slight relief  They also started diclofenac tablets however the patient is unsure if she is taking them  Patient is having a lot of anxiety and depression due to her pain  She tells me that she does not want to go anywhere because she is afraid there will be no or for her to sit down, she is stressed about bills in her home because she has not been able to work for 3 years  I have personally reviewed and/or updated the patient's past medical history, past surgical history, family history, social history, current medications, allergies, and vital signs today  Review of Systems   Respiratory: Positive for shortness of breath  Gastrointestinal: Positive for nausea  Musculoskeletal: Positive for back pain, gait problem, neck pain and neck stiffness  Neurological: Positive for dizziness and weakness         Patient Active Problem List   Diagnosis    Tear of medial meniscus of left knee, current    Primary osteoarthritis of left knee    Pyelonephritis    Wheezing    Class 3 severe obesity with body mass index (BMI) of 40 0 to 44 9 in adult ([facility] [address] )    Tobacco use    Sepsis ([facility] [address] [facility]    Chronic bilateral low back pain without sciatica    Lumbar spondylosis    Primary osteoarthritis of knees, bilateral       Past Medical History:   Diagnosis Date    Arthritis     Chronic pain     Fibromyalgia, primary     Headache(784 0) Always    Hiatal hernia     High cholesterol     History of transfusion     Years ago    Hypertension        Past Surgical History:   Procedure Laterality Date     SECTION      CHOLECYSTECTOMY      FL GUIDED NEEDLE PLAC BX/ASP/INJ  10/27/2021    HERNIA REPAIR      KIDNEY SURGERY      LIVER SURGERY         Family History   Problem Relation Age of Onset    Cancer Mother     Diabetes Father     Heart disease Father     Cancer Father        Social History     Occupational History    Not on file Tobacco Use    Smoking status: Current Every Day Smoker     Packs/day: 0 50     Years: 0 00     Pack years: 0 00     Types: Cigarettes    Smokeless tobacco: Never Used    Tobacco comment: in process of quitting 9/7/21   Vaping Use    Vaping Use: Never used   Substance and Sexual Activity    Alcohol use: No    Drug use: No    Sexual activity: Not on file       Current Outpatient Medications on File Prior to Visit   Medication Sig    acetaminophen (TYLENOL) 325 mg tablet Take 650 mg by mouth every 6 (six) hours as needed for mild pain    albuterol (PROVENTIL HFA,VENTOLIN HFA) 90 mcg/act inhaler Inhale 2 puffs as needed     amLODIPine (NORVASC) 10 mg tablet Take 10 mg by mouth daily    atorvastatin (LIPITOR) 40 mg tablet Take 40 mg by mouth daily     budesonide-formoterol (Symbicort) 160-4 5 mcg/act inhaler Inhale 2 puffs 2 (two) times a day    diclofenac (VOLTAREN) 75 mg EC tablet Take 1 tablet (75 mg total) by mouth 2 (two) times a day    ergocalciferol (VITAMIN D2) 50,000 units Take 1 capsule (50,000 Units total) by mouth once a week    hydrocortisone 2 5 % ointment Apply topically 2 (two) times a day    lidocaine (Lidoderm) 5 % Apply 1 patch topically daily Remove & Discard patch within 12 hours or as directed by MD    lisinopril-hydrochlorothiazide (PRINZIDE,ZESTORETIC) 20-25 MG per tablet TAKE 1 TABLET BY MOUTH DAILY   PLEASE NOTE DOSE CHANGE    tiZANidine (ZANAFLEX) 2 mg tablet Take 2 mg by mouth as needed     [DISCONTINUED] DULoxetine (CYMBALTA) 30 mg delayed release capsule Take 1 capsule (30 mg total) by mouth daily    [DISCONTINUED] pregabalin (LYRICA) 75 mg capsule Take 1 capsule (75 mg total) by mouth 3 (three) times a day    Diclofenac Sodium (VOLTAREN) 1 % Apply 2 g topically 4 (four) times a day (Patient not taking: Reported on 2/23/2022 )    fluticasone-salmeterol (Advair) 250-50 mcg/dose inhaler Inhale 1 puff 2 (two) times a day (Patient not taking: Reported on 12/22/2021 ) No current facility-administered medications on file prior to visit  No Known Allergies    Physical Exam    /86   Pulse 101   Ht 5' 7" (1 702 m)   Wt 135 kg (297 lb 8 oz)   BMI 46 60 kg/m²     Constitutional: normal, well developed, well nourished, alert, in no distress and non-toxic and no overt pain behavior    Endomorphic body habitus  Eyes: anicteric  HEENT: grossly intact  Neck: supple, symmetric, trachea midline and no masses   Pulmonary:even and unlabored  Cardiovascular:No edema or pitting edema present  Skin:Normal without rashes or lesions and well hydrated  Psychiatric:Mood and affect appropriate  Neurologic:Cranial Nerves II-XII grossly intact  Musculoskeletal:normal    Imaging

## 2022-06-22 ENCOUNTER — HOSPITAL ENCOUNTER (OUTPATIENT)
Dept: NEUROLOGY | Facility: CLINIC | Age: 47
Discharge: HOME | End: 2022-06-22
Payer: COMMERCIAL

## 2022-06-22 DIAGNOSIS — M25.532 PAIN OF BOTH WRIST JOINTS: ICD-10-CM

## 2022-06-22 DIAGNOSIS — M25.531 PAIN OF BOTH WRIST JOINTS: ICD-10-CM

## 2022-06-22 PROCEDURE — 95886 MUSC TEST DONE W/N TEST COMP: CPT | Performed by: PSYCHIATRY & NEUROLOGY

## 2022-06-22 PROCEDURE — 95912 NRV CNDJ TEST 11-12 STUDIES: CPT | Performed by: PSYCHIATRY & NEUROLOGY

## 2022-07-22 ENCOUNTER — TELEPHONE (OUTPATIENT)
Dept: PAIN MEDICINE | Facility: MEDICAL CENTER | Age: 47
End: 2022-07-22

## 2022-07-22 NOTE — TELEPHONE ENCOUNTER
Attempted to reach pt, got message stating the wireless customer is not available  Will try again later

## 2022-07-22 NOTE — TELEPHONE ENCOUNTER
Pt contacted Call Center requested refill of their medication  Medication Name:  Duloxetine     Dosage of Med:  60 mg     Frequency of Med:  1 tab in morning     Remaining Medication:  1 week left     Pt contacted Call Center requested refill of their medication          Medication Name:  Lyrica    Dosage of Med:  100 mg     Frequency of Med:  1 tab TID     Remaining Medication:  1 week     Pt would like to see if she can possible have an increase on medication

## 2022-07-25 DIAGNOSIS — M47.816 LUMBAR SPONDYLOSIS: ICD-10-CM

## 2022-07-25 DIAGNOSIS — G89.29 CHRONIC BILATERAL LOW BACK PAIN WITHOUT SCIATICA: ICD-10-CM

## 2022-07-25 DIAGNOSIS — M54.50 CHRONIC BILATERAL LOW BACK PAIN WITHOUT SCIATICA: ICD-10-CM

## 2022-07-25 RX ORDER — DULOXETIN HYDROCHLORIDE 60 MG/1
60 CAPSULE, DELAYED RELEASE ORAL DAILY
Qty: 30 CAPSULE | Refills: 2 | Status: SHIPPED | OUTPATIENT
Start: 2022-07-25 | End: 2022-08-03 | Stop reason: SDUPTHER

## 2022-07-25 RX ORDER — PREGABALIN 100 MG/1
100 CAPSULE ORAL 3 TIMES DAILY
Qty: 90 CAPSULE | Refills: 2 | Status: SHIPPED | OUTPATIENT
Start: 2022-07-25 | End: 2022-10-03 | Stop reason: SDUPTHER

## 2022-07-25 NOTE — TELEPHONE ENCOUNTER
Meds refilled per KW on call physician for [address]  Pt aware per eyal regarding same  KW did not increase dose due to wanting pt to be seen in [facility] for increase or to check with JE when back in the office

## 2022-07-25 NOTE — TELEPHONE ENCOUNTER
RN again attempted to reach pt via phone number and again received a message that I cannot leave a message  RN reached out to pt via [place] and is waiting for a response for same

## 2022-07-26 ENCOUNTER — OFFICE VISIT (OUTPATIENT)
Dept: OBGYN CLINIC | Facility: MEDICAL CENTER | Age: 47
End: 2022-07-26
Payer: COMMERCIAL

## 2022-07-26 VITALS
BODY MASS INDEX: 45.99 KG/M2 | HEART RATE: 98 BPM | WEIGHT: 293 LBS | HEIGHT: 67 IN | DIASTOLIC BLOOD PRESSURE: 90 MMHG | SYSTOLIC BLOOD PRESSURE: 143 MMHG

## 2022-07-26 DIAGNOSIS — M17.0 PRIMARY OSTEOARTHRITIS OF KNEES, BILATERAL: Primary | ICD-10-CM

## 2022-07-26 PROCEDURE — 99213 OFFICE O/P EST LOW 20 MIN: CPT | Performed by: PHYSICIAN ASSISTANT

## 2022-07-26 PROCEDURE — 20610 DRAIN/INJ JOINT/BURSA W/O US: CPT | Performed by: PHYSICIAN ASSISTANT

## 2022-07-26 RX ORDER — BUPIVACAINE HYDROCHLORIDE 5 MG/ML
10 INJECTION, SOLUTION EPIDURAL; INTRACAUDAL
Status: COMPLETED | OUTPATIENT
Start: 2022-07-26 | End: 2022-07-26

## 2022-07-26 RX ORDER — METHYLPREDNISOLONE ACETATE 40 MG/ML
1 INJECTION, SUSPENSION INTRA-ARTICULAR; INTRALESIONAL; INTRAMUSCULAR; SOFT TISSUE
Status: COMPLETED | OUTPATIENT
Start: 2022-07-26 | End: 2022-07-26

## 2022-07-26 RX ORDER — BUPIVACAINE HYDROCHLORIDE 5 MG/ML
6 INJECTION, SOLUTION EPIDURAL; INTRACAUDAL
Status: COMPLETED | OUTPATIENT
Start: 2022-07-26 | End: 2022-07-26

## 2022-07-26 RX ADMIN — BUPIVACAINE HYDROCHLORIDE 10 ML: 5 INJECTION, SOLUTION EPIDURAL; INTRACAUDAL at 16:14

## 2022-07-26 RX ADMIN — METHYLPREDNISOLONE ACETATE 1 ML: 40 INJECTION, SUSPENSION INTRA-ARTICULAR; INTRALESIONAL; INTRAMUSCULAR; SOFT TISSUE at 16:14

## 2022-07-26 RX ADMIN — BUPIVACAINE HYDROCHLORIDE 6 ML: 5 INJECTION, SOLUTION EPIDURAL; INTRACAUDAL at 16:14

## 2022-07-26 NOTE — PROGRESS NOTES
Orthopaedic Surgery - Office Note  Jana Asif (48 y o  female)   : 1975   MRN: [id number]  Encounter Date: 2022    Chief Complaint   Patient presents with    Left Knee - Follow-up    Right Knee - Follow-up     Assessment / Plan  Bilateral knee osteoarthritis     · Had lengthy discussion with the patient regarding my recommendation to lose weight, which will help her knee pain as well as allow her to quality for TKA if/when she desires to move forward to surgical treatment of her bilateral knee pain  She states that she has had issues in the past with getting weight loss treatment approved through her insurance so she has not been able to pursue this much  · CSI of left knee joint was performed  · Aspiration and CSI of right knee joint was performed  · Activity as tolerated  · ROM without restrictions  · Anti-inflammatories or Tylenol prn pain  Return if symptoms worsen or fail to improve  History of Present Illness  Jana Asif is a 48 y o  female who presents for follow up evaluation of her bilateral knee osteoarthritis  She complains of severe pain bilaterally, worse in the right recently  This pain is affected her quality of life and does not allow her to work, play with her grandchild, or do the activities she typically likes to do  She also complains of back and hip pain, for which she sees [facility] as well  She reports she has gained 100 lb since her knee pain started  She has tried to pursue bariatric surgery/weight management but has run into insurance issues  She reports CSI of the knees typically give her some temporary relief  Her last injection for both knees was in 2021 which she feels helped her for short period of time until she saw Rheumatology again in 2022 who repeated her injections which also helped for about 2 months  Review of Systems  Pertinent items are noted in HPI    All other systems were reviewed and are negative  Physical Exam  /90   Pulse 98   Ht 5' 7" (1 702 m)   Wt (!) 136 kg (300 lb 3 2 oz)   BMI 47 02 kg/m²   Cons: Appears well  No apparent distress  Psych: Alert  Oriented x3  Mood and affect normal   Eyes: PERRLA, EOMI  Resp: Normal effort  No audible wheezing or stridor  CV: Palpable pulse  No discernable arrhythmia  No LE edema  Lymph:  No palpable cervical, axillary, or inguinal lymphadenopathy  Skin: Warm  No palpable masses  No visible lesions  Neuro: Normal muscle tone  Normal and symmetric DTR's  Bilateral Knee Exam  Alignment:  Bilateral genu varus  Inspection:  Moderate swelling  Palpation:  Moderate effusion to right knee  ROM:  Knee Extension 15°  Knee Flexion 110°  Strength:  Not tested  Stability:  Not tested  Tests:  No pertinent positive or negative tests  Patella:  Patella tracks centrally with crepitus  Neurovascular:  Sensation intact in DP/SP/George/Sa/T nerve distributions  Palpable DP & PT pulses  Gait:  Antalgic  Studies Reviewed  I have personally reviewed pertinent films in PACS  XR of left knee - 2/26/21 images show tricompartmental OA with severe narrowing  XR of right knee - 5/4/18 images show mild OA with mild joint narrowing  Large joint arthrocentesis: R knee  Universal Protocol:  Consent: Verbal consent obtained  Consent given by: patient  Patient identity confirmed: verbally with patient    Supporting Documentation  Indications: pain   Procedure Details  Location: knee - R knee  Needle size: 18 G  Approach: lateral  Medications administered: 10 mL bupivacaine (PF) 0 5 %; 1 mL methylPREDNISolone acetate 40 mg/mL    Aspirate amount: 20 mL  Aspirate: clear, serous, yellow and bloody    Patient tolerance: patient tolerated the procedure well with no immediate complications  Dressing:  Sterile dressing applied    Large joint arthrocentesis: L knee  Universal Protocol:  Consent: Verbal consent obtained    Consent given by: patient    Supporting Documentation  Indications: pain   Procedure Details  Location: knee - L knee  Needle size: 25 G  Approach: anterolateral  Medications administered: 6 mL bupivacaine (PF) 0 5 %; 1 mL methylPREDNISolone acetate 40 mg/mL    Patient tolerance: patient tolerated the procedure well with no immediate complications  Dressing:  Sterile dressing applied              Medical, Surgical, Family, and Social History  The patient's medical history, family history, and social history, were reviewed and updated as appropriate      Past Medical History:   Diagnosis Date    Arthritis     Chronic pain     Fibromyalgia, primary     Headache(784 0) Always    Hiatal hernia     High cholesterol     History of transfusion     Years ago    Hypertension        Past Surgical History:   Procedure Laterality Date     SECTION      CHOLECYSTECTOMY      FL GUIDED NEEDLE PLAC BX/ASP/INJ  10/27/2021    HERNIA REPAIR      KIDNEY SURGERY      LIVER SURGERY         Family History   Problem Relation Age of Onset    Cancer Mother     Diabetes Father     Heart disease Father     Cancer Father        Social History     Occupational History    Not on file   Tobacco Use    Smoking status: Current Every Day Smoker     Packs/day: 0 50     Years: 0 00     Pack years: 0 00     Types: Cigarettes    Smokeless tobacco: Never Used    Tobacco comment: in process of quitting 21   Vaping Use    Vaping Use: Never used   Substance and Sexual Activity    Alcohol use: No    Drug use: No    Sexual activity: Not on file       No Known Allergies      Current Outpatient Medications:     acetaminophen (TYLENOL) 325 mg tablet, Take 650 mg by mouth every 6 (six) hours as needed for mild pain, Disp: , Rfl:     albuterol (PROVENTIL HFA,VENTOLIN HFA) 90 mcg/act inhaler, Inhale 2 puffs as needed , Disp: , Rfl:     amLODIPine (NORVASC) 10 mg tablet, Take 10 mg by mouth daily, Disp: , Rfl:     atorvastatin (LIPITOR) 40 mg tablet, Take 40 mg by mouth daily , Disp: , Rfl:     budesonide-formoterol (SYMBICORT) 160-4 5 mcg/act inhaler, Inhale 2 puffs 2 (two) times a day, Disp: , Rfl:     diclofenac (VOLTAREN) 75 mg EC tablet, Take 1 tablet (75 mg total) by mouth 2 (two) times a day, Disp: 60 tablet, Rfl: 6    Diclofenac Sodium (VOLTAREN) 1 %, Apply 2 g topically 4 (four) times a day, Disp: 2 g, Rfl: 2    DULoxetine (CYMBALTA) 60 mg delayed release capsule, Take 1 capsule (60 mg total) by mouth daily, Disp: 30 capsule, Rfl: 2    ergocalciferol (VITAMIN D2) 50,000 units, Take 1 capsule (50,000 Units total) by mouth once a week, Disp: 12 capsule, Rfl: 1    fluticasone-salmeterol (Advair) 250-50 mcg/dose inhaler, Inhale 1 puff 2 (two) times a day, Disp: , Rfl:     lisinopril-hydrochlorothiazide (PRINZIDE,ZESTORETIC) 20-25 MG per tablet, TAKE 1 TABLET BY MOUTH DAILY   PLEASE NOTE DOSE CHANGE, Disp: , Rfl:     pregabalin (LYRICA) 100 mg capsule, Take 1 capsule (100 mg total) by mouth 3 (three) times a day, Disp: 90 capsule, Rfl: 2    tiZANidine (ZANAFLEX) 2 mg tablet, Take 2 mg by mouth as needed , Disp: , Rfl:     hydrocortisone 2 5 % ointment, Apply topically 2 (two) times a day, Disp: 30 g, Rfl: 3    lidocaine (Lidoderm) 5 %, Apply 1 patch topically daily Remove & Discard patch within 12 hours or as directed by MD, Disp: 90 patch, Rfl: 0      [facility]    I,:   am acting as a scribe while in the presence of the attending physician :       I,:   personally performed the services described in this documentation    as scribed in my presence :

## 2022-08-03 ENCOUNTER — OFFICE VISIT (OUTPATIENT)
Dept: PAIN MEDICINE | Facility: MEDICAL CENTER | Age: 47
End: 2022-08-03
Payer: COMMERCIAL

## 2022-08-03 VITALS
OXYGEN SATURATION: 98 % | SYSTOLIC BLOOD PRESSURE: 122 MMHG | BODY MASS INDEX: 45.99 KG/M2 | HEIGHT: 67 IN | WEIGHT: 293 LBS | TEMPERATURE: 97.7 F | HEART RATE: 95 BPM | DIASTOLIC BLOOD PRESSURE: 78 MMHG

## 2022-08-03 DIAGNOSIS — G89.4 CHRONIC PAIN SYNDROME: Primary | ICD-10-CM

## 2022-08-03 DIAGNOSIS — G89.29 CHRONIC PAIN OF BOTH KNEES: ICD-10-CM

## 2022-08-03 DIAGNOSIS — M25.561 CHRONIC PAIN OF BOTH KNEES: ICD-10-CM

## 2022-08-03 DIAGNOSIS — M54.50 CHRONIC BILATERAL LOW BACK PAIN WITHOUT SCIATICA: ICD-10-CM

## 2022-08-03 DIAGNOSIS — G89.29 CHRONIC BILATERAL LOW BACK PAIN WITHOUT SCIATICA: ICD-10-CM

## 2022-08-03 DIAGNOSIS — M25.562 CHRONIC PAIN OF BOTH KNEES: ICD-10-CM

## 2022-08-03 DIAGNOSIS — M79.7 FIBROMYALGIA, PRIMARY: ICD-10-CM

## 2022-08-03 DIAGNOSIS — M79.18 MYOFASCIAL PAIN SYNDROME: ICD-10-CM

## 2022-08-03 DIAGNOSIS — R52 DIFFUSE PAIN: ICD-10-CM

## 2022-08-03 DIAGNOSIS — M47.816 LUMBAR SPONDYLOSIS: ICD-10-CM

## 2022-08-03 DIAGNOSIS — M17.0 PRIMARY OSTEOARTHRITIS OF KNEES, BILATERAL: ICD-10-CM

## 2022-08-03 PROCEDURE — 99214 OFFICE O/P EST MOD 30 MIN: CPT | Performed by: NURSE PRACTITIONER

## 2022-08-03 RX ORDER — DULOXETIN HYDROCHLORIDE 30 MG/1
CAPSULE, DELAYED RELEASE ORAL
Qty: 30 CAPSULE | Refills: 2 | Status: SHIPPED | OUTPATIENT
Start: 2022-08-03 | End: 2022-08-25

## 2022-08-03 RX ORDER — TIZANIDINE 2 MG/1
TABLET ORAL
Qty: 90 TABLET | Refills: 2 | Status: SHIPPED | OUTPATIENT
Start: 2022-08-03 | End: 2022-08-25

## 2022-08-03 RX ORDER — DULOXETIN HYDROCHLORIDE 60 MG/1
CAPSULE, DELAYED RELEASE ORAL
Qty: 30 CAPSULE | Refills: 2 | Status: SHIPPED | OUTPATIENT
Start: 2022-08-03 | End: 2022-10-03 | Stop reason: SDUPTHER

## 2022-08-03 RX ORDER — PREGABALIN 100 MG/1
100 CAPSULE ORAL 3 TIMES DAILY
Qty: 90 CAPSULE | Refills: 2 | Status: CANCELLED | OUTPATIENT
Start: 2022-08-03

## 2022-08-03 NOTE — PROGRESS NOTES
Assessment  1  Chronic pain syndrome    2  Diffuse pain    3  Chronic bilateral low back pain without sciatica    4  Chronic pain of both knees    5  Lumbar spondylosis    6  Primary osteoarthritis of knees, bilateral    7  Myofascial pain syndrome    8  Fibromyalgia, primary        Plan  While the patient was in the office today, I did have a thorough conversation with the patient regarding their chronic pain syndrome, symptoms, medication regimen, and treatment plan  I would very thorough conversation with the patient as it is quite evident that she is very overwhelmed, depressed, and just frustrated with the lack of relief and the direction that her life is currently going in  I discussed with the patient that at this point as she does have an office visit later on today with her primary care provider to discuss if they will consider prescribing some kind of appetite suppressant try to help her lose weight as her insurance unfortunately will not cover any kind of treatment with our weight management program including medication options  The patient also had intentions of following up with her primary care provider to discuss her depression and maybe a referral to talk to someone or help with medications for her depression  I thoroughly encouraged the patient to follow up with her primary care provider as scheduled later on today to discuss these options as well as seriously discussing with her primary care provider the possibility of applying for disability so maybe she could financially be in a better place and have better access to insurance that would allow her other opportunities and to maybe even consider weight loss surgical options in the future  The patient was agreeable and verbalized an understanding           I discussed with the patient that from a pain management standpoint I feel would be in her best interest to further titrate the Cymbalta to 90 mg a day to try to help with both her chronic pain symptoms and her depression and in the meantime we are going to titrate her off of the Lyrica as she is definitely on a therapeutic dose and has not noticed any improvement  I did discuss with the patient that if for some reason once she titrate off the Lyrica if she feels that it was more helpful than she thought it was we could always put her back on the Lyrica and she should call our office  I did give her instructions on how to titrate off of the Lyrica  I advised the patient that if they experience any side effects or issues with the changes in their medication regiment, they should give our office a call to discuss  I also advised the patient not to drive or operate machinery until they see how the changes in the medication regimen affects them  The patient was agreeable and verbalized an understanding  I also discussed with the patient overall I feel there is definitely significant myofascial component to her pain symptoms and since she was previously prescribed tizanidine and was taking it as needed with some relief, I would like her to restart the tizanidine 2 mg and she can take 1 pill at bedtime and then slowly work herself up to 2-3 pills a day as needed  I advised the patient that if they experience any side effects or issues with the changes in their medication regiment, they should give our office a call to discuss  I also advised the patient not to drive or operate machinery until they see how the changes in the medication regimen affects them  The patient was agreeable and verbalized an understanding  The patient is schedule follow-up office visit in 12 weeks and at that point time we will re-evaluate her medication regimen and treatment plan options  The patient was agreeable and verbalized an understanding  My impressions and treatment recommendations were discussed in detail with the patient who verbalized understanding and had no further questions    Discharge instructions were provided  I personally saw and examined the patient and I agree with the above discussed plan of care  No orders of the defined types were placed in this encounter  New Medications Ordered This Visit   Medications    DULoxetine (CYMBALTA) 60 mg delayed release capsule     Sig: Take 1 PO QD  Dispense:  30 capsule     Refill:  2    DULoxetine (CYMBALTA) 30 mg delayed release capsule     Sig: Take 1 PO QD with 60 mg a day to equal 90 mg a day  Dispense:  30 capsule     Refill:  2    tiZANidine (ZANAFLEX) 2 mg tablet     Sig: Take 1 PO TID PRN for pain/spasms  Dispense:  90 tablet     Refill:  2       History of Present Illness    Camilo Ortiz is a 48 y o  female who is currently being treated for chronic and diffuse pain symptoms secondary to lumbar spondylosis, primary osteoarthritis of the bilateral knees and diffuse myofascial pain secondary to fibromyalgia  The patient presents today for regular medication follow-up visit and to discuss her treatment plan options as despite the increase in her medications at her last office visit her pain symptoms continue to worsen and she just continues to have diffuse and changing pain almost on daily basis  Patient reports that her biggest and most chronic pain is the worsening and chronic low back and hip pain as well as the chronic knee pain and reports that overall she feels that she is becoming more more depressed and continues to gain weight even though she is trying to lose weight and is just very frustrated and does not know how to proceed and is just going to find something they give her some kind of relief  The patient is currently being managed on Cymbalta 60 mg daily and Lyrica 100 mg t i d  The patient presents today to discuss her medication regimen and treatment plan options      I have personally reviewed and/or updated the patient's past medical history, past surgical history, family history, social history, current medications, allergies, and vital signs today  Review of Systems   Respiratory: Positive for shortness of breath  Cardiovascular: Negative for chest pain  Gastrointestinal: Positive for nausea  Negative for constipation, diarrhea and vomiting  Musculoskeletal: Positive for gait problem and joint swelling  Negative for arthralgias and myalgias  Skin: Negative for rash  Neurological: Positive for dizziness and weakness  Negative for seizures  All other systems reviewed and are negative        Patient Active Problem List   Diagnosis    Tear of medial meniscus of left knee, current    Primary osteoarthritis of left knee    Pyelonephritis    Wheezing    Class 3 severe obesity with body mass index (BMI) of 40 0 to 44 9 in adult ([facility] [address] )    Tobacco use    Sepsis (HCC)    Chronic bilateral low back pain without sciatica    Lumbar spondylosis    Primary osteoarthritis of knees, bilateral    Pain of both wrist joints    Bilateral carpal tunnel syndrome    Chronic pain of both knees    Diffuse pain    Chronic pain syndrome    Myofascial pain syndrome    Fibromyalgia, primary       Past Medical History:   Diagnosis Date    Arthritis     Chronic pain     Fibromyalgia, primary     Headache(784 0) Always    Hiatal hernia     High cholesterol     History of transfusion     Years ago    Hypertension        Past Surgical History:   Procedure Laterality Date     SECTION      CHOLECYSTECTOMY      FL GUIDED NEEDLE PLAC BX/ASP/INJ  10/27/2021    HERNIA REPAIR      KIDNEY SURGERY      LIVER SURGERY         Family History   Problem Relation Age of Onset    Cancer Mother     Diabetes Father     Heart disease Father     Cancer Father        Social History     Occupational History    Not on file   Tobacco Use    Smoking status: Current Every Day Smoker     Packs/day: 0 50     Years: 0 00     Pack years: 0 00     Types: Cigarettes    Smokeless tobacco: Never Used    Tobacco comment: in process of quitting 9/7/21   Vaping Use    Vaping Use: Never used   Substance and Sexual Activity    Alcohol use: No    Drug use: No    Sexual activity: Not on file       Current Outpatient Medications on File Prior to Visit   Medication Sig    acetaminophen (TYLENOL) 325 mg tablet Take 650 mg by mouth every 6 (six) hours as needed for mild pain    albuterol (PROVENTIL HFA,VENTOLIN HFA) 90 mcg/act inhaler Inhale 2 puffs as needed     amLODIPine (NORVASC) 10 mg tablet Take 10 mg by mouth daily    atorvastatin (LIPITOR) 40 mg tablet Take 40 mg by mouth daily     budesonide-formoterol (SYMBICORT) 160-4 5 mcg/act inhaler Inhale 2 puffs 2 (two) times a day    diclofenac (VOLTAREN) 75 mg EC tablet Take 1 tablet (75 mg total) by mouth 2 (two) times a day    Diclofenac Sodium (VOLTAREN) 1 % Apply 2 g topically 4 (four) times a day    ergocalciferol (VITAMIN D2) 50,000 units Take 1 capsule (50,000 Units total) by mouth once a week    fluticasone-salmeterol (Advair) 250-50 mcg/dose inhaler Inhale 1 puff 2 (two) times a day    hydrocortisone 2 5 % ointment Apply topically 2 (two) times a day    lisinopril-hydrochlorothiazide (PRINZIDE,ZESTORETIC) 20-25 MG per tablet TAKE 1 TABLET BY MOUTH DAILY  PLEASE NOTE DOSE CHANGE    pregabalin (LYRICA) 100 mg capsule Take 1 capsule (100 mg total) by mouth 3 (three) times a day    [DISCONTINUED] DULoxetine (CYMBALTA) 60 mg delayed release capsule Take 1 capsule (60 mg total) by mouth daily    [DISCONTINUED] tiZANidine (ZANAFLEX) 2 mg tablet Take 2 mg by mouth as needed     lidocaine (Lidoderm) 5 % Apply 1 patch topically daily Remove & Discard patch within 12 hours or as directed by MD     No current facility-administered medications on file prior to visit         No Known Allergies    Physical Exam    /78   Pulse 95   Temp 97 7 °F (36 5 °C)   Ht 5' 7" (1 702 m)   Wt (!) 137 kg (301 lb)   SpO2 98%   BMI 47 14 kg/m²     Constitutional: normal, well developed, well nourished, alert, in no distress and non-toxic and no overt pain behavior  Eyes: anicteric  HEENT: grossly intact  Neck: supple, symmetric, trachea midline and no masses   Pulmonary:even and unlabored  Cardiovascular:No edema or pitting edema present  Skin:Normal without rashes or lesions and well hydrated  Psychiatric:Mood and affect appropriate  Neurologic:Cranial Nerves II-XII grossly intact  Musculoskeletal:The patient's gait is slightly antalgic, painful, but steady without the use of any assistive devices      Imaging

## 2022-08-03 NOTE — PATIENT INSTRUCTIONS
Lyrica: Decrease to 1 pill 2 x day x 4 days, then 1 pill daily x 4 days, then 1 pill ever other day x 4 days, then STOP

## 2022-08-12 DIAGNOSIS — E55.9 VITAMIN D DEFICIENCY: ICD-10-CM

## 2022-08-13 RX ORDER — ERGOCALCIFEROL 1.25 MG/1
CAPSULE ORAL
Qty: 12 CAPSULE | Refills: 1 | Status: SHIPPED | OUTPATIENT
Start: 2022-08-13 | End: 2022-09-12

## 2022-08-25 DIAGNOSIS — G89.4 CHRONIC PAIN SYNDROME: ICD-10-CM

## 2022-08-25 DIAGNOSIS — M79.18 MYOFASCIAL PAIN SYNDROME: ICD-10-CM

## 2022-08-25 DIAGNOSIS — G89.29 CHRONIC PAIN OF BOTH KNEES: ICD-10-CM

## 2022-08-25 DIAGNOSIS — G89.29 CHRONIC BILATERAL LOW BACK PAIN WITHOUT SCIATICA: ICD-10-CM

## 2022-08-25 DIAGNOSIS — M47.816 LUMBAR SPONDYLOSIS: ICD-10-CM

## 2022-08-25 DIAGNOSIS — M25.561 CHRONIC PAIN OF BOTH KNEES: ICD-10-CM

## 2022-08-25 DIAGNOSIS — R52 DIFFUSE PAIN: ICD-10-CM

## 2022-08-25 DIAGNOSIS — M54.50 CHRONIC BILATERAL LOW BACK PAIN WITHOUT SCIATICA: ICD-10-CM

## 2022-08-25 DIAGNOSIS — M25.562 CHRONIC PAIN OF BOTH KNEES: ICD-10-CM

## 2022-08-25 DIAGNOSIS — M17.0 PRIMARY OSTEOARTHRITIS OF KNEES, BILATERAL: ICD-10-CM

## 2022-08-25 RX ORDER — TIZANIDINE 2 MG/1
TABLET ORAL
Qty: 270 TABLET | Refills: 1 | Status: SHIPPED | OUTPATIENT
Start: 2022-08-25 | End: 2022-10-03 | Stop reason: SDUPTHER

## 2022-08-25 RX ORDER — DULOXETIN HYDROCHLORIDE 30 MG/1
CAPSULE, DELAYED RELEASE ORAL
Qty: 90 CAPSULE | Refills: 1 | Status: SHIPPED | OUTPATIENT
Start: 2022-08-25 | End: 2022-10-03 | Stop reason: SDUPTHER

## 2022-09-12 DIAGNOSIS — E55.9 VITAMIN D DEFICIENCY: ICD-10-CM

## 2022-09-12 RX ORDER — ERGOCALCIFEROL 1.25 MG/1
CAPSULE ORAL
Qty: 12 CAPSULE | Refills: 2 | Status: SHIPPED | OUTPATIENT
Start: 2022-09-12 | End: 2022-10-03 | Stop reason: SDUPTHER

## 2022-09-30 NOTE — PROGRESS NOTES
Assessment and Plan:   Rohini Collins is a 50 y o   female who presents for follow-up lumbar DDD and radiculopathy, which is not controlled  Has carpal tunnel syndrome, right worse than left on latest EMG; also has left ulnar neuropathy; recommended wrist splints at bedtime, may benefit from hand therapy down the road and hand surgery evaluation  Autoimmune/inflammatory process ruled out  Pain management to consider lumbar epidural steroid injection for significant lumbar radiculopathy  Water therapy referral made  Continue duloxetine 90mg daily  Continue Lyrica 100mg three times a day  Continue diclofenac twice a day as needed for joint pain  Take tizanidine 2mg in morning and afternoon, and 4mg at bedtime  Do knee x-rays  Wear wrist splints at bedtime  Continue Vit  D weekly for Vit  D deficiency    Return to clinic in 7 months    Plan:  Diagnoses and all orders for this visit:    Chronic bilateral low back pain without sciatica  -     [facility] Aquatic Therapy  -     tiZANidine (ZANAFLEX) 2 mg tablet; Take 1 tab in the morning, 1 tab in the afternoon, and 2 tabs at bedtime  -     pregabalin (LYRICA) 100 mg capsule; Take 1 capsule (100 mg total) by mouth 3 (three) times a day  -     DULoxetine (CYMBALTA) 60 mg delayed release capsule; Take 1 PO QD   -     DULoxetine (CYMBALTA) 30 mg delayed release capsule; TAKE 1 CAPSULE DAILY    Lumbar spondylosis  -     SL Aquatic Therapy  -     tiZANidine (ZANAFLEX) 2 mg tablet; Take 1 tab in the morning, 1 tab in the afternoon, and 2 tabs at bedtime  -     pregabalin (LYRICA) 100 mg capsule; Take 1 capsule (100 mg total) by mouth 3 (three) times a day  -     DULoxetine (CYMBALTA) 60 mg delayed release capsule; Take 1 PO QD   -     DULoxetine (CYMBALTA) 30 mg delayed release capsule; TAKE 1 CAPSULE DAILY  -     diclofenac (VOLTAREN) 75 mg EC tablet;  Take 1 tablet (75 mg total) by mouth 2 (two) times a day    Polyarthralgia  -     SL Aquatic Therapy  -     XR knee 3 vw right non injury; Future  -     XR knee 3 vw left non injury; Future  -     diclofenac (VOLTAREN) 75 mg EC tablet; Take 1 tablet (75 mg total) by mouth 2 (two) times a day    Bilateral carpal tunnel syndrome    Type 2 diabetes mellitus without complication, without long-term current use of insulin (HCC)    Osteoarthritis of left knee, unspecified osteoarthritis type  -     XR knee 3 vw left non injury; Future    Right knee pain, unspecified chronicity  -     XR knee 3 vw right non injury; Future    Primary osteoarthritis of knees, bilateral  -     tiZANidine (ZANAFLEX) 2 mg tablet; Take 1 tab in the morning, 1 tab in the afternoon, and 2 tabs at bedtime  -     DULoxetine (CYMBALTA) 30 mg delayed release capsule; TAKE 1 CAPSULE DAILY    Chronic pain syndrome  -     tiZANidine (ZANAFLEX) 2 mg tablet; Take 1 tab in the morning, 1 tab in the afternoon, and 2 tabs at bedtime  -     DULoxetine (CYMBALTA) 30 mg delayed release capsule; TAKE 1 CAPSULE DAILY    Diffuse pain  -     tiZANidine (ZANAFLEX) 2 mg tablet; Take 1 tab in the morning, 1 tab in the afternoon, and 2 tabs at bedtime  -     DULoxetine (CYMBALTA) 30 mg delayed release capsule; TAKE 1 CAPSULE DAILY    Chronic pain of both knees  -     tiZANidine (ZANAFLEX) 2 mg tablet; Take 1 tab in the morning, 1 tab in the afternoon, and 2 tabs at bedtime    Myofascial pain syndrome  -     tiZANidine (ZANAFLEX) 2 mg tablet; Take 1 tab in the morning, 1 tab in the afternoon, and 2 tabs at bedtime    Pain of both wrist joints  -     diclofenac (VOLTAREN) 75 mg EC tablet; Take 1 tablet (75 mg total) by mouth 2 (two) times a day    Primary osteoarthritis of left knee    Vitamin D deficiency  -     ergocalciferol (VITAMIN D2) 50,000 units;  Take 1 capsule (50,000 Units total) by mouth once a week  Follow-up plan: RTC in 7 months        Rheumatic Disease Summary  4/4/2022: Amanda Lewis is a 48 y o   female who presents as a Rheumatology consult referred by Emerita Pierre, [address] [place] [address] evaluation of polyarthralgia  Autoimmune disease workup has been unremarkable; her inflammatory markers are elevated, which is nonspecific  Do labs  Start Lyrica 75mg three times a day; stop gabapentin  Continue Cymbalta at bedtime  Stop ibuprofen; can take Tylenol  Start diclofenac twice a day with food, as needed for joint pain  Try hydrocortisone ointment for dry skin on hands  Left knee and left hip bursa injections given in clinic today  Schedule EMG  HPI  Katlin Morales is a 50 y o   female who presents for follow-up chronic pain  Last clinic visit was 4/4/2022 which was her initial visit  Left knee and hip bursa injections didn't help  Having a lot of shooting pain from low back to hips and ankles; right knee swells and feels unstable  The following portions of the patient's history were reviewed and updated as appropriate: allergies, current medications, past family history, past medical history, past social history, past surgical history and problem list     Review of Systems:   Review of Systems   Constitutional: Negative for fatigue  HENT: Negative for mouth sores  Eyes: Negative for pain  Respiratory: Negative for shortness of breath  Cardiovascular: Negative for leg swelling  Musculoskeletal: Positive for arthralgias, back pain, joint swelling and myalgias  Skin: Negative for rash  Neurological: Negative for weakness  Hematological: Negative for adenopathy  Psychiatric/Behavioral: Negative for sleep disturbance  Reviewed and agree      Home Medications:    Current Outpatient Medications:     acetaminophen (TYLENOL) 325 mg tablet, Take 650 mg by mouth every 6 (six) hours as needed for mild pain, Disp: , Rfl:     albuterol (PROVENTIL HFA,VENTOLIN HFA) 90 mcg/act inhaler, Inhale 2 puffs as needed , Disp: , Rfl:     amLODIPine (NORVASC) 10 mg tablet, Take 10 mg by mouth daily, Disp: , Rfl:     atorvastatin (LIPITOR) 40 mg tablet, Take 40 mg by mouth daily , Disp: , Rfl:    budesonide-formoterol (SYMBICORT) 160-4 5 mcg/act inhaler, Inhale 2 puffs 2 (two) times a day, Disp: , Rfl:     diclofenac (VOLTAREN) 75 mg EC tablet, Take 1 tablet (75 mg total) by mouth 2 (two) times a day, Disp: 60 tablet, Rfl: 6    Diclofenac Sodium (VOLTAREN) 1 %, Apply 2 g topically 4 (four) times a day, Disp: 2 g, Rfl: 2    DULoxetine (CYMBALTA) 30 mg delayed release capsule, TAKE 1 CAPSULE DAILY, Disp: 30 capsule, Rfl: 6    DULoxetine (CYMBALTA) 60 mg delayed release capsule, Take 1 PO QD , Disp: 30 capsule, Rfl: 6    ergocalciferol (VITAMIN D2) 50,000 units, Take 1 capsule (50,000 Units total) by mouth once a week, Disp: 12 capsule, Rfl: 2    fluticasone-umeclidinium-vilanterol (Trelegy Ellipta) 200-62 5-25 MCG/INH AEPB inhaler, Inhale 1 puff daily, Disp: , Rfl:     hydrocortisone 2 5 % ointment, Apply topically 2 (two) times a day, Disp: 30 g, Rfl: 3    lisinopril-hydrochlorothiazide (PRINZIDE,ZESTORETIC) 20-25 MG per tablet, TAKE 1 TABLET BY MOUTH DAILY  PLEASE NOTE DOSE CHANGE, Disp: , Rfl:     pregabalin (LYRICA) 100 mg capsule, Take 1 capsule (100 mg total) by mouth 3 (three) times a day, Disp: 90 capsule, Rfl: 6    tiZANidine (ZANAFLEX) 2 mg tablet, Take 1 tab in the morning, 1 tab in the afternoon, and 2 tabs at bedtime, Disp: 360 tablet, Rfl: 6    fluticasone-salmeterol (Advair) 250-50 mcg/dose inhaler, Inhale 1 puff 2 (two) times a day, Disp: , Rfl:     lidocaine (Lidoderm) 5 %, Apply 1 patch topically daily Remove & Discard patch within 12 hours or as directed by MD, Disp: 90 patch, Rfl: 0    Objective:    Vitals:    10/03/22 0943   BP: 135/82   Weight: 136 kg (300 lb)   Height: 5' 7" (1 702 m)       Physical Exam  Constitutional:       General: She is not in acute distress  HENT:      Head: Normocephalic and atraumatic  Eyes:      Conjunctiva/sclera: Conjunctivae normal    Cardiovascular:      Rate and Rhythm: Normal rate and regular rhythm        Heart sounds: S1 normal and S2 normal      No friction rub  Pulmonary:      Effort: Pulmonary effort is normal  No respiratory distress  Breath sounds: Normal breath sounds  No wheezing, rhonchi or rales  Musculoskeletal:         General: Tenderness present  Cervical back: Neck supple  Comments: Right knee tenderness   Skin:     Coloration: Skin is not pale  Neurological:      Mental Status: She is alert  Mental status is at baseline  Psychiatric:         Mood and Affect: Mood normal          Behavior: Behavior normal        Reviewed labs and imaging  Imaging:   XR chest 9/8/2022  No acute cardiopulmonary disease  EMG 6/23/2022  Findings consistent with bilateral carpal tunnel syndrome  Moderate on the right, mild on the left  Mild left ulnar neuropathy  XR right foot and ankle 9/7/21  Prominent plantar and retrocalcaneal spurs noted  1st MTP and midfoot degenerative change noted   There is mild hallux valgus deformity      XR lumbar spine 8/5/21  Grade 1-2 anterolisthesis at L4-5, which shows no significant change with flexion and extension       Moderate disc space narrowing at L4-5   Moderate to severe disc space narrowing at L5-S1   Advanced facet degenerative changes bilaterally at L4-5 and L5-S1      CXR 4/4/21  No acute cardiopulmonary disease      XR left knee 2/26/21  Tricompartmental osteoarthritis with severe narrowing of the medial tibiofemoral joint space and osteophytes seen in all 3 compartments  There is mild genu varus      XR right foot 1/19/21  Moderate hallux valgus deformity   Mild 1st MTP joint arthrosis   Moderate size plantar and dorsal calcaneal spurs      Labs:   Office Visit on 04/04/2022   Component Date Value Ref Range Status    WBC 04/04/2022 11 16 (A) 4 31 - 10 16 Thousand/uL Final    RBC 04/04/2022 4 60  3 81 - 5 12 Million/uL Final    Hemoglobin 04/04/2022 14 6  11 5 - 15 4 g/dL Final    Hematocrit 04/04/2022 44 1  34 8 - 46 1 % Final    MCV 04/04/2022 96  82 - 98 fL Final  MCH 04/04/2022 31 7  26 8 - 34 3 pg Final    MCHC 04/04/2022 33 1  31 4 - 37 4 g/dL Final    RDW 04/04/2022 15 8 (A) 11 6 - 15 1 % Final    MPV 04/04/2022 9 5  8 9 - 12 7 fL Final    Platelets 91/79/6495 375  149 - 390 Thousands/uL Final    nRBC 04/04/2022 0  /100 WBCs Final    Neutrophils Relative 04/04/2022 73  43 - 75 % Final    Immat GRANS % 04/04/2022 0  0 - 2 % Final    Lymphocytes Relative 04/04/2022 17  14 - 44 % Final    Monocytes Relative 04/04/2022 7  4 - 12 % Final    Eosinophils Relative 04/04/2022 2  0 - 6 % Final    Basophils Relative 04/04/2022 1  0 - 1 % Final    Neutrophils Absolute 04/04/2022 8 17 (A) 1 85 - 7 62 Thousands/µL Final    Immature Grans Absolute 04/04/2022 0 05  0 00 - 0 20 Thousand/uL Final    Lymphocytes Absolute 04/04/2022 1 85  0 60 - 4 47 Thousands/µL Final    Monocytes Absolute 04/04/2022 0 80  0 17 - 1 22 Thousand/µL Final    Eosinophils Absolute 04/04/2022 0 22  0 00 - 0 61 Thousand/µL Final    Basophils Absolute 04/04/2022 0 07  0 00 - 0 10 Thousands/µL Final    Sodium 04/04/2022 136  136 - 145 mmol/L Final    Potassium 04/04/2022 4 1  3 5 - 5 3 mmol/L Final    Chloride 04/04/2022 107  100 - 108 mmol/L Final    CO2 04/04/2022 21  21 - 32 mmol/L Final    ANION GAP 04/04/2022 8  4 - 13 mmol/L Final    BUN 04/04/2022 11  5 - 25 mg/dL Final    Creatinine 04/04/2022 0 95  0 60 - 1 30 mg/dL Final    Standardized to IDMS reference method    Glucose, Fasting 04/04/2022 112 (A) 65 - 99 mg/dL Final    Specimen collection should occur prior to Sulfasalazine administration due to the potential for falsely depressed results  Specimen collection should occur prior to Sulfapyridine administration due to the potential for falsely elevated results   Calcium 04/04/2022 9 5  8 3 - 10 1 mg/dL Final    AST 04/04/2022 58 (A) 5 - 45 U/L Final    Specimen collection should occur prior to Sulfasalazine administration due to the potential for falsely depressed results   ALT 04/04/2022 69  12 - 78 U/L Final    Specimen collection should occur prior to Sulfasalazine and/or Sulfapyridine administration due to the potential for falsely depressed results   Alkaline Phosphatase 04/04/2022 130 (A) 46 - 116 U/L Final    Total Protein 04/04/2022 7 8  6 4 - 8 2 g/dL Final    Albumin 04/04/2022 3 8  3 5 - 5 0 g/dL Final    Total Bilirubin 04/04/2022 0 30  0 20 - 1 00 mg/dL Final    Use of this assay is not recommended for patients undergoing treatment with eltrombopag due to the potential for falsely elevated results   eGFR 04/04/2022 71  ml/min/1 73sq m Final    CRP 04/04/2022 17 9 (A) <3 0 mg/L Final    Sed Rate 04/04/2022 56 (A) 0 - 19 mm/hour Final    Vit D, 25-Hydroxy 04/04/2022 6 8 (A) 30 0 - 100 0 ng/mL Final    HLA B27 04/04/2022 Negative   Final    HLA-B*27 Negative  B27 allele interpretation for all loci based on IMGT/HLA  database version 3 44  This test was developed and its performance characteristics  determined by [facility]   It has not been cleared or approved  by the Food and Drug Administration  [facility] CLIA ID Number [id number]  This test was performed using PCR (Polymerase Chain Reaction)/SSOP  (Sequence Specific Oligonucleotide Probes) technique  SBT (Sequence  Based Typing) and/or SSP (Sequence Specific Primers) may be used as  supplemental methods when necessary  Please contact [facility] Customer  Service at [phone] if you have any questions     Director of [facility] Laboratory   Dr Maday Amezcua, PhD    Cyclic Citrullinated Peptide Ab  04/04/2022 0 9  See comment Final    Rheumatoid Factor 04/04/2022 Negative  Negative Final    JOSE 04/04/2022 Negative  Negative Final    Anti HARRIS-1 04/04/2022 <0 2  0 0 - 0 9 AI Final

## 2022-10-03 ENCOUNTER — OFFICE VISIT (OUTPATIENT)
Dept: RHEUMATOLOGY | Facility: CLINIC | Age: 47
End: 2022-10-03
Payer: COMMERCIAL

## 2022-10-03 VITALS
SYSTOLIC BLOOD PRESSURE: 135 MMHG | BODY MASS INDEX: 45.99 KG/M2 | HEIGHT: 67 IN | DIASTOLIC BLOOD PRESSURE: 82 MMHG | WEIGHT: 293 LBS

## 2022-10-03 DIAGNOSIS — M25.561 RIGHT KNEE PAIN, UNSPECIFIED CHRONICITY: ICD-10-CM

## 2022-10-03 DIAGNOSIS — M17.12 PRIMARY OSTEOARTHRITIS OF LEFT KNEE: ICD-10-CM

## 2022-10-03 DIAGNOSIS — R52 DIFFUSE PAIN: ICD-10-CM

## 2022-10-03 DIAGNOSIS — G56.03 BILATERAL CARPAL TUNNEL SYNDROME: ICD-10-CM

## 2022-10-03 DIAGNOSIS — M25.562 CHRONIC PAIN OF BOTH KNEES: ICD-10-CM

## 2022-10-03 DIAGNOSIS — M25.50 POLYARTHRALGIA: ICD-10-CM

## 2022-10-03 DIAGNOSIS — G89.29 CHRONIC PAIN OF BOTH KNEES: ICD-10-CM

## 2022-10-03 DIAGNOSIS — E11.9 TYPE 2 DIABETES MELLITUS WITHOUT COMPLICATION, WITHOUT LONG-TERM CURRENT USE OF INSULIN (HCC): ICD-10-CM

## 2022-10-03 DIAGNOSIS — M54.50 CHRONIC BILATERAL LOW BACK PAIN WITHOUT SCIATICA: Primary | ICD-10-CM

## 2022-10-03 DIAGNOSIS — M25.532 PAIN OF BOTH WRIST JOINTS: ICD-10-CM

## 2022-10-03 DIAGNOSIS — M25.531 PAIN OF BOTH WRIST JOINTS: ICD-10-CM

## 2022-10-03 DIAGNOSIS — M47.816 LUMBAR SPONDYLOSIS: ICD-10-CM

## 2022-10-03 DIAGNOSIS — M79.18 MYOFASCIAL PAIN SYNDROME: ICD-10-CM

## 2022-10-03 DIAGNOSIS — M25.561 CHRONIC PAIN OF BOTH KNEES: ICD-10-CM

## 2022-10-03 DIAGNOSIS — E55.9 VITAMIN D DEFICIENCY: ICD-10-CM

## 2022-10-03 DIAGNOSIS — G89.29 CHRONIC BILATERAL LOW BACK PAIN WITHOUT SCIATICA: Primary | ICD-10-CM

## 2022-10-03 DIAGNOSIS — M17.12 OSTEOARTHRITIS OF LEFT KNEE, UNSPECIFIED OSTEOARTHRITIS TYPE: ICD-10-CM

## 2022-10-03 DIAGNOSIS — G89.4 CHRONIC PAIN SYNDROME: ICD-10-CM

## 2022-10-03 DIAGNOSIS — M17.0 PRIMARY OSTEOARTHRITIS OF KNEES, BILATERAL: ICD-10-CM

## 2022-10-03 PROCEDURE — 99214 OFFICE O/P EST MOD 30 MIN: CPT | Performed by: INTERNAL MEDICINE

## 2022-10-03 RX ORDER — DICLOFENAC SODIUM 75 MG/1
75 TABLET, DELAYED RELEASE ORAL 2 TIMES DAILY
Qty: 60 TABLET | Refills: 6 | Status: SHIPPED | OUTPATIENT
Start: 2022-10-03

## 2022-10-03 RX ORDER — ERGOCALCIFEROL 1.25 MG/1
50000 CAPSULE ORAL WEEKLY
Qty: 12 CAPSULE | Refills: 2 | Status: SHIPPED | OUTPATIENT
Start: 2022-10-03

## 2022-10-03 RX ORDER — DULOXETIN HYDROCHLORIDE 60 MG/1
CAPSULE, DELAYED RELEASE ORAL
Qty: 30 CAPSULE | Refills: 6 | Status: SHIPPED | OUTPATIENT
Start: 2022-10-03

## 2022-10-03 RX ORDER — PREGABALIN 100 MG/1
100 CAPSULE ORAL 3 TIMES DAILY
Qty: 90 CAPSULE | Refills: 6 | Status: SHIPPED | OUTPATIENT
Start: 2022-10-03

## 2022-10-03 RX ORDER — DULOXETIN HYDROCHLORIDE 30 MG/1
CAPSULE, DELAYED RELEASE ORAL
Qty: 30 CAPSULE | Refills: 6 | Status: SHIPPED | OUTPATIENT
Start: 2022-10-03

## 2022-10-03 RX ORDER — TIZANIDINE 2 MG/1
TABLET ORAL
Qty: 360 TABLET | Refills: 6 | Status: SHIPPED | OUTPATIENT
Start: 2022-10-03

## 2022-10-06 ENCOUNTER — OFFICE VISIT (OUTPATIENT)
Dept: PAIN MEDICINE | Facility: MEDICAL CENTER | Age: 47
End: 2022-10-06
Payer: COMMERCIAL

## 2022-10-06 VITALS
TEMPERATURE: 97.8 F | BODY MASS INDEX: 45.99 KG/M2 | WEIGHT: 293 LBS | SYSTOLIC BLOOD PRESSURE: 124 MMHG | HEART RATE: 85 BPM | HEIGHT: 67 IN | DIASTOLIC BLOOD PRESSURE: 66 MMHG | OXYGEN SATURATION: 96 %

## 2022-10-06 DIAGNOSIS — M54.42 CHRONIC BILATERAL LOW BACK PAIN WITH BILATERAL SCIATICA: ICD-10-CM

## 2022-10-06 DIAGNOSIS — G89.29 CHRONIC BILATERAL LOW BACK PAIN WITH BILATERAL SCIATICA: ICD-10-CM

## 2022-10-06 DIAGNOSIS — M47.816 LUMBAR SPONDYLOSIS: ICD-10-CM

## 2022-10-06 DIAGNOSIS — M54.41 CHRONIC BILATERAL LOW BACK PAIN WITH BILATERAL SCIATICA: ICD-10-CM

## 2022-10-06 DIAGNOSIS — M54.16 LUMBAR RADICULITIS: Primary | ICD-10-CM

## 2022-10-06 PROCEDURE — 99214 OFFICE O/P EST MOD 30 MIN: CPT | Performed by: PHYSICAL MEDICINE & REHABILITATION

## 2022-10-06 RX ORDER — PANTOPRAZOLE SODIUM 40 MG/1
40 TABLET, DELAYED RELEASE ORAL 2 TIMES DAILY
COMMUNITY
Start: 2022-09-29

## 2022-10-06 RX ORDER — MONTELUKAST SODIUM 10 MG/1
TABLET ORAL
COMMUNITY
Start: 2022-09-29

## 2022-10-06 RX ORDER — METRONIDAZOLE 500 MG/1
TABLET ORAL
COMMUNITY
Start: 2022-09-19

## 2022-10-06 NOTE — PROGRESS NOTES
Assessment:  1  Lumbar radiculitis    2  Lumbar spondylosis    3  Chronic bilateral low back pain with bilateral sciatica        Plan:  1  At this time an MRI of the lumbar spine is warranted given the patient's chronic lower back and radiating leg pain symptoms which are leading to functional decline as she is experiencing ambulatory dysfunction and now relying and a cane  She has participated in physical therapy in the past and is starting aquatherapy again now  She is failing oral analgesics including neuropathic pain medications and duloxetine  She is demonstrating positive bilateral lower extremity radiating pain with bilateral straight leg raise testing being positive in the seated position and also has absence of the lower extremity reflexes bilaterally  Once her MRI is available for review we will determine if an epidural steroid injection is appropriate for her or whether she needs to be seen by 1 of our spine surgeons  My impressions and treatment recommendations were discussed in detail with the patient who verbalized understanding and had no further questions  Discharge instructions were provided  I personally saw and examined the patient and I agree with the above discussed plan of care  Orders Placed This Encounter   Procedures    MRI lumbar spine wo contrast     Standing Status:   Future     Standing Expiration Date:   10/6/2026     Scheduling Instructions: There is no preparation for this test  Please leave your jewelry and valuables at home, wedding rings are the exception  All patients will be required to change into a hospital gown and pants  Street clothes are not permitted in the MRI  Magnetic nail polish must be removed prior to arrival for your test  Please bring your insurance cards, a form of photo ID and a list of your medications with you  Arrive 15 minutes prior to your appointment time in order to register   Please bring any prior CT or MRI studies of this area that were not performed at a [address]  To schedule this appointment, please contact Central Scheduling at [phone]  Prior to your appointment, please make sure you complete the MRI Screening Form when you e-Check in for your appointment  This will be available starting 7 days before your appointment in [address]  You may receive an e-mail with an activation code if you do not have a [email or web address] account  If you do not have access to a device, we will complete your screening at your appointment  Order Specific Question:   What is the patient's sedation requirement? Answer:   No Sedation     Order Specific Question:   Is the patient pregnant? Answer:   No     Order Specific Question:   Does the patient have metallic implants? Answer:   No     Order Specific Question:   Does this procedure require the 3T MRI at [address] [facility]? Answer:   No     Order Specific Question:   Release to patient through [employer]     Answer:   Immediate     Order Specific Question:   Is order priority selected as STAT? Answer:   No     Order Specific Question:   Reason for Exam (FREE TEXT)     Answer:   back and bilateral radiating lower extremity pain     New Medications Ordered This Visit   Medications    metFORMIN (GLUCOPHAGE) 500 mg tablet     Sig: Take 500 mg by mouth 2 (two) times a day with meals    metroNIDAZOLE (FLAGYL) 500 mg tablet     Sig: TAKE 1 TABLET BY MOUTH TWICE A DAY FOR 7 DAYS    montelukast (SINGULAIR) 10 mg tablet     Sig: TAKE 1 TABLET BY MOUTH EVERY DAY AT NIGHT    pantoprazole (PROTONIX) 40 mg tablet     Sig: Take 40 mg by mouth 2 (two) times a day    Semaglutide,0 25 or 0 5MG/DOS, 2 MG/1 5ML SOPN     Sig: Inject 0 25 mg under the skin Once a week       History of Present Illness:  Latoya Lam is a 50 y o  female who presents for a follow up office visit in regards to Back Pain  The patients current symptoms include severe back and radiating bilateral leg pain    She is currently describing pain that is rated as a 10/10 which is constant throughout the entirety of the day and described as throbbing shooting and pins and needle sensation  She is experiencing severe functional deficits especially with ambulatory activity and is not relying on a cane  She is concerned she will need to move towards a walker in the near future  She describes difficulty walking decreased range of motion dizziness joint stiffness swelling pain in the back and legs as well as shortness of breath with activity  I have personally reviewed and/or updated the patient's past medical history, past surgical history, family history, social history, current medications, allergies, and vital signs today  Review of Systems   Respiratory: Positive for shortness of breath  Cardiovascular: Negative for chest pain  Gastrointestinal: Negative for constipation, diarrhea, nausea and vomiting  Musculoskeletal: Positive for back pain, gait problem, joint swelling and myalgias  Negative for arthralgias  Skin: Negative for rash  Neurological: Positive for dizziness  Negative for seizures and weakness  All other systems reviewed and are negative        Patient Active Problem List   Diagnosis    Tear of medial meniscus of left knee, current    Primary osteoarthritis of left knee    Pyelonephritis    Wheezing    Class 3 severe obesity with body mass index (BMI) of 40 0 to 44 9 in adult ([facility] [address] )    Tobacco use    Sepsis (HCC)    Chronic bilateral low back pain without sciatica    Lumbar spondylosis    Primary osteoarthritis of knees, bilateral    Pain of both wrist joints    Bilateral carpal tunnel syndrome    Chronic pain of both knees    Diffuse pain    Chronic pain syndrome    Myofascial pain syndrome    Fibromyalgia, primary    Type 2 diabetes mellitus without complication, without long-term current use of insulin (HCC)       Past Medical History:   Diagnosis Date    Arthritis     Chronic pain     Fibromyalgia, primary     Headache(784 0) Always    Hiatal hernia     High cholesterol     History of transfusion     Years ago    Hypertension        Past Surgical History:   Procedure Laterality Date     SECTION      CHOLECYSTECTOMY      FL GUIDED NEEDLE PLAC BX/ASP/INJ  10/27/2021    HERNIA REPAIR      KIDNEY SURGERY      LIVER SURGERY         Family History   Problem Relation Age of Onset    Cancer Mother     Diabetes Father     Heart disease Father     Cancer Father        Social History     Occupational History    Not on file   Tobacco Use    Smoking status: Current Every Day Smoker     Packs/day: 0 10     Years: 0 00     Pack years: 0 00     Types: Cigarettes    Smokeless tobacco: Never Used    Tobacco comment: in process of quitting 21   Vaping Use    Vaping Use: Never used   Substance and Sexual Activity    Alcohol use: No    Drug use: No    Sexual activity: Not on file       Current Outpatient Medications on File Prior to Visit   Medication Sig    acetaminophen (TYLENOL) 325 mg tablet Take 650 mg by mouth every 6 (six) hours as needed for mild pain    albuterol (PROVENTIL HFA,VENTOLIN HFA) 90 mcg/act inhaler Inhale 2 puffs as needed     amLODIPine (NORVASC) 10 mg tablet Take 10 mg by mouth daily    atorvastatin (LIPITOR) 40 mg tablet Take 40 mg by mouth daily     budesonide-formoterol (SYMBICORT) 160-4 5 mcg/act inhaler Inhale 2 puffs 2 (two) times a day    diclofenac (VOLTAREN) 75 mg EC tablet Take 1 tablet (75 mg total) by mouth 2 (two) times a day    DULoxetine (CYMBALTA) 30 mg delayed release capsule TAKE 1 CAPSULE DAILY    DULoxetine (CYMBALTA) 60 mg delayed release capsule Take 1 PO QD      ergocalciferol (VITAMIN D2) 50,000 units Take 1 capsule (50,000 Units total) by mouth once a week    fluticasone-umeclidinium-vilanterol (Trelegy Ellipta) 200-62 5-25 MCG/INH AEPB inhaler Inhale 1 puff daily    hydrocortisone 2 5 % ointment Apply topically 2 (two) times a day    lisinopril-hydrochlorothiazide (PRINZIDE,ZESTORETIC) 20-25 MG per tablet TAKE 1 TABLET BY MOUTH DAILY  PLEASE NOTE DOSE CHANGE    metFORMIN (GLUCOPHAGE) 500 mg tablet Take 500 mg by mouth 2 (two) times a day with meals    metroNIDAZOLE (FLAGYL) 500 mg tablet TAKE 1 TABLET BY MOUTH TWICE A DAY FOR 7 DAYS    montelukast (SINGULAIR) 10 mg tablet TAKE 1 TABLET BY MOUTH EVERY DAY AT NIGHT    pantoprazole (PROTONIX) 40 mg tablet Take 40 mg by mouth 2 (two) times a day    pregabalin (LYRICA) 100 mg capsule Take 1 capsule (100 mg total) by mouth 3 (three) times a day    Semaglutide,0 25 or 0 5MG/DOS, 2 MG/1 5ML SOPN Inject 0 25 mg under the skin Once a week    tiZANidine (ZANAFLEX) 2 mg tablet Take 1 tab in the morning, 1 tab in the afternoon, and 2 tabs at bedtime    Diclofenac Sodium (VOLTAREN) 1 % Apply 2 g topically 4 (four) times a day (Patient not taking: Reported on 10/6/2022)    fluticasone-salmeterol (Advair) 250-50 mcg/dose inhaler Inhale 1 puff 2 (two) times a day    lidocaine (Lidoderm) 5 % Apply 1 patch topically daily Remove & Discard patch within 12 hours or as directed by MD     No current facility-administered medications on file prior to visit  No Known Allergies    Physical Exam:    /66   Pulse 85   Temp 97 8 °F (36 6 °C)   Ht 5' 7" (1 702 m)   Wt 136 kg (300 lb)   SpO2 96%   BMI 46 99 kg/m²     Constitutional:normal, well developed, well nourished, alert, in severe distress and non-toxic and positive overt pain behavior    Eyes:anicteric  HEENT:grossly intact  Neck:supple, symmetric, trachea midline and no masses   Pulmonary:even and unlabored  Cardiovascular:No edema or pitting edema present  Psychiatric:Mood and affect appropriate  Neurologic:Cranial Nerves II-XII grossly intact, bilateral lower extremity muscle stretch reflexes are absent at the knees and ankles, positive straight leg raise test from a seated position bilaterally, there is weakness with ankle dorsiflexion bilaterally graded 4/5 and weakness with knee extension also graded 4/5 bilaterally  Musculoskeletal:normal    Imaging

## 2022-10-07 ENCOUNTER — TELEPHONE (OUTPATIENT)
Dept: OBGYN CLINIC | Facility: HOSPITAL | Age: 47
End: 2022-10-07

## 2022-10-07 NOTE — TELEPHONE ENCOUNTER
Sent patient [employer] message, was unable to reach patient via phone call, phone number stated to try again later

## 2022-10-07 NOTE — TELEPHONE ENCOUNTER
Caller: Patient    Doctor: Amy Parsons    Reason for call: Patient is calling to request if her aqua therapy script could be redone and add both of her knees on it as well  She stated Dr Azar Gallagher also stated she would benefit from it  If the knees and the back are all on one referral she will be able to have one evaluation      Call back#: [phone]

## 2022-10-12 ENCOUNTER — EVALUATION (OUTPATIENT)
Dept: PHYSICAL THERAPY | Age: 47
End: 2022-10-12
Payer: COMMERCIAL

## 2022-10-12 DIAGNOSIS — M54.50 CHRONIC BILATERAL LOW BACK PAIN WITHOUT SCIATICA: ICD-10-CM

## 2022-10-12 DIAGNOSIS — M25.50 POLYARTHRALGIA: Primary | ICD-10-CM

## 2022-10-12 DIAGNOSIS — M47.816 LUMBAR SPONDYLOSIS: ICD-10-CM

## 2022-10-12 DIAGNOSIS — G89.29 CHRONIC BILATERAL LOW BACK PAIN WITHOUT SCIATICA: ICD-10-CM

## 2022-10-12 PROBLEM — N12 PYELONEPHRITIS: Status: RESOLVED | Noted: 2021-04-04 | Resolved: 2022-10-12

## 2022-10-12 PROBLEM — A41.9 SEPSIS (HCC): Status: RESOLVED | Noted: 2021-04-05 | Resolved: 2022-10-12

## 2022-10-12 PROCEDURE — 97163 PT EVAL HIGH COMPLEX 45 MIN: CPT | Performed by: PHYSICAL THERAPIST

## 2022-10-12 NOTE — PROGRESS NOTES
PT Evaluation     Today's date: 10/12/2022  Patient name: Primitivo Mcclellan  : 1975  MRN: [id number]  Referring provider: Andi Correa MD  Dx:   Encounter Diagnosis     ICD-10-CM    1  Polyarthralgia  M25 50                   Assessment  Assessment details: Patient seen for PT evaluation for aqua therapy for back and leg pain, for B knee pain  Patient presents with decreased ROM, decreased LE strength, poor posture, decreased core strength, gait and balance deficits, significant functional deficits 2* pain and unable to tolerate working 2* full body pain; h/o falls  Patient is a good candidate for aqua therapy  PT recommending 2x/week x 4-6 weeks  Impairments: abnormal gait, abnormal or restricted ROM, activity intolerance, impaired balance, impaired physical strength, lacks appropriate home exercise program, pain with function, poor posture  and poor body mechanics  Functional limitations: Pain with transfers, walking, IADLs, recreational activities, with sitting, standing, with stair climbing Understanding of Dx/Px/POC: good   Prognosis: good    Goals  Impairment Goals to be met within 4 weeks  - Patient to tolerate exercising in the pool with less pain  - Improve ROM by 5-10 degrees lumbar flexion to improve patient's flexibility with IADLs  - Increase strength to 4/5 throughout to improve gait pattern and speed  - Improve upright posture in standing and walking     Functional Goals to be met within 4-6 weeks  - Return to Prior Level of Function  - Increase Functional Status Measure to: expected  - Patient will be independent with HEP with progression to community based fitness  - Improve stride length and gait speed during gait with SPC  - Reduce TUG time by 1-2 seconds         Plan  Patient would benefit from: skilled physical therapy  Planned modality interventions: cryotherapy and thermotherapy: hydrocollator packs  Planned therapy interventions: abdominal trunk stabilization, joint mobilization, manual therapy, neuromuscular re-education, patient education, postural training, strengthening, stretching, therapeutic activities, therapeutic exercise, home exercise program, gait training, aquatic therapy, balance and body mechanics training  Frequency: 1x week  Duration in weeks: 4  Treatment plan discussed with: patient        Subjective Evaluation    History of Present Illness  Mechanism of injury: Patient with long history of back and B knee pain, polyarthralgia  Patient trialed land therapy, felt significant pain and wasn't able to tolerate  Patient reports that she has tried aqua therapy in the past and does feel that she's able to tolerate exercising in the pool better vs on land  Patient reports that her pain has progressed significantly; fell 2-3x within the last year and now feels that she needs the cane, significant back pain and loses her balance  Patient has reduced what she does because of her pain, limits her carrying; walking distance, up/down the steps  Patient with onset of B LE radiating symptoms into B feet- L>R- radiates down posterior and lateral B LEs to the heels/ankles  Pain  Current pain ratin  At best pain ratin  At worst pain rating: 10  Location: lumbar spine, L>R LE pain  Quality: radiating, sharp, dull ache and tight  Aggravating factors: sitting, standing, walking, stair climbing and lifting  Progression: worsening    Social Support  Steps to enter house: no  Stairs in house: yes (basement- doesn't go)   Lives in: multiple-level home  Lives with: spouse and young children    Employment status: not working    Diagnostic Tests  X-ray: abnormal  MRI studies: abnormal  Treatments  Previous treatment: physical therapy  Patient Goals  Patient goals for therapy: decreased pain, improved balance, increased motion, increased strength and independence with ADLs/IADLs          Objective     Concurrent Complaints  Positive for disturbed sleep   Negative for night pain, bladder dysfunction, bowel dysfunction and saddle (S4) numbness    Neurological Testing     Sensation     Lumbar   Left   Paresthesia: light touch    Right   Paresthesia: light touch    Reflexes   Left   Patellar (L4): trace (1+)  Clonus sign: negative    Right   Patellar (L4): trace (1+)  Clonus sign: negative    Active Range of Motion     Lumbar   Flexion: 60 degrees  with pain  Extension: 10 degrees  with pain  Left lateral flexion: 10 degrees    with pain  Right lateral flexion: 12 degrees  with pain  Left rotation: 5 degrees  with pain  Right rotation: 7 degrees  with pain  Left Hip   Flexion: 75 degrees     Right Hip   Flexion: 75 degrees   Left Knee   Flexion: 95 degrees   Extension: 0 degrees     Right Knee   Flexion: 95 degrees   Extension: 0 degrees   Left Ankle/Foot   Dorsiflexion (ke): 0 degrees     Right Ankle/Foot   Dorsiflexion (ke): 0 degrees     Strength/Myotome Testing     Left Hip   Planes of Motion   Flexion: 3+  Abduction: 3+  Adduction: 3+    Right Hip   Planes of Motion   Flexion: 3+  Abduction: 3+  Adduction: 3+    Left Knee   Flexion: 3+  Extension: 3+    Right Knee   Flexion: 3+  Extension: 3+    Left Ankle/Foot   Dorsiflexion: 4  Plantar flexion: 4    Right Ankle/Foot   Dorsiflexion: 4  Plantar flexion: 4    Tests     Lumbar     Left   Positive slump test      Right   Positive slump test    Neuro Exam:     Functional outcomes   2 minute walk test: 100'  TU (seconds)  Functional outcome gait comment: Patient ambulates with SPC with slow gait speed, unequal stride length, wide base of support, decreased gait speed, stopping ~ 50' 2* pain in LEs and back; flexed trunk and posture               Precautions: 2 unit charge 2* 20% co-insurance    POOL  Manuals                                        Neuro Re-Ed                                                                Ther Ex                Lap walking                Seated ham stretch        SKTC        LAQ                SLR x 3        HR Rows and extension        Horizontal abd/add        Abd/add                 squats        sidestepping        Bench cycling                        Ther Activity                        Gait Training                        Modalities                        Progress as able   Add paddles when ready

## 2022-10-14 ENCOUNTER — OFFICE VISIT (OUTPATIENT)
Dept: OBGYN CLINIC | Facility: MEDICAL CENTER | Age: 47
End: 2022-10-14
Payer: COMMERCIAL

## 2022-10-14 VITALS
SYSTOLIC BLOOD PRESSURE: 135 MMHG | DIASTOLIC BLOOD PRESSURE: 95 MMHG | BODY MASS INDEX: 45.99 KG/M2 | HEART RATE: 99 BPM | WEIGHT: 293 LBS | HEIGHT: 67 IN

## 2022-10-14 DIAGNOSIS — M17.0 PRIMARY OSTEOARTHRITIS OF KNEES, BILATERAL: Primary | ICD-10-CM

## 2022-10-14 PROCEDURE — 99213 OFFICE O/P EST LOW 20 MIN: CPT | Performed by: PHYSICIAN ASSISTANT

## 2022-10-14 PROCEDURE — 20610 DRAIN/INJ JOINT/BURSA W/O US: CPT | Performed by: PHYSICIAN ASSISTANT

## 2022-10-14 RX ORDER — METHYLPREDNISOLONE ACETATE 40 MG/ML
1 INJECTION, SUSPENSION INTRA-ARTICULAR; INTRALESIONAL; INTRAMUSCULAR; SOFT TISSUE
Status: COMPLETED | OUTPATIENT
Start: 2022-10-14 | End: 2022-10-14

## 2022-10-14 RX ORDER — BUPIVACAINE HYDROCHLORIDE 5 MG/ML
6 INJECTION, SOLUTION EPIDURAL; INTRACAUDAL
Status: COMPLETED | OUTPATIENT
Start: 2022-10-14 | End: 2022-10-14

## 2022-10-14 RX ORDER — CEFDINIR 300 MG/1
300 CAPSULE ORAL 2 TIMES DAILY
COMMUNITY
Start: 2022-10-07 | End: 2022-10-17

## 2022-10-14 RX ADMIN — BUPIVACAINE HYDROCHLORIDE 6 ML: 5 INJECTION, SOLUTION EPIDURAL; INTRACAUDAL at 11:07

## 2022-10-14 RX ADMIN — METHYLPREDNISOLONE ACETATE 1 ML: 40 INJECTION, SUSPENSION INTRA-ARTICULAR; INTRALESIONAL; INTRAMUSCULAR; SOFT TISSUE at 11:07

## 2022-10-14 NOTE — PROGRESS NOTES
Orthopaedic Surgery - Office Note  Stevie Quan (51 y o  female)   : 1975   MRN: [id number]  Encounter Date: 10/14/2022    Chief Complaint   Patient presents with   • Left Knee - Follow-up   • Right Knee - Follow-up     Assessment / Plan  Bilateral knee osteoarthritis     · We had another discussion about the patient's weight loss journey  Her insurance continues to deny any attempts to pursue weight loss surgery  She is working with her PCP now  We do continue with the recommendation to lose weight, which will help her knee pain as well as allow her to quality for TKA if/when she desires to move forward to surgical treatment of her bilateral knee pain  · CSI of left knee joint was performed  · Aspiration of 30 mL serous sanguinous fluid and CSI of right knee joint was performed  · Activity as tolerated  · ROM without restrictions  · Anti-inflammatories or Tylenol prn pain  Return in about 3 months (around 2023) for Follow-up with Ivanna Davis  History of Present Illness  Stevie Quan is a 50 y o  female who presents for follow up evaluation of her bilateral knee osteoarthritis  She complains of severe pain bilaterally, worse in the right recently  This pain is affected her quality of life and does not allow her to work, play with her grandchild, or do the activities she typically likes to do  She also complains of back and hip pain, for which she sees [facility] as well  She is currently pursuing epidural injections for her back with Dr Chin Panchal  She reports she has gained 100 lb since her knee pain started  She has tried to pursue bariatric surgery/weight management but has continued to run into insurance issues  She reports CSI of the knees typically give her some temporary relief  Her last injection for both knees was in on 2022 which do help  She did have viscosupplementation in the past which she feels did not make any difference    She would like to continue with steroid injections at this time  Review of Systems  Pertinent items are noted in HPI  All other systems were reviewed and are negative  Physical Exam  /95   Pulse 99   Ht 5' 7" (1 702 m)   Wt 136 kg (300 lb)   BMI 46 99 kg/m²   Cons: Appears well  No apparent distress  Psych: Alert  Oriented x3  Mood and affect normal   Eyes: PERRLA, EOMI  Resp: Normal effort  No audible wheezing or stridor  CV: Palpable pulse  No discernable arrhythmia  No LE edema  Lymph:  No palpable cervical, axillary, or inguinal lymphadenopathy  Skin: Warm  No palpable masses  No visible lesions  Neuro: Normal muscle tone  Normal and symmetric DTR's  Bilateral Knee Exam  Alignment:  Bilateral genu varus  Inspection:  Moderate swelling  Palpation:  Moderate effusion to right knee  ROM:  Knee Extension 15°  Knee Flexion 110°  Strength:  Not tested  Stability:  Not tested  Tests:  No pertinent positive or negative tests  Patella:  Patella tracks centrally with crepitus  Neurovascular:  Sensation intact in DP/SP/George/Sa/T nerve distributions  Palpable DP & PT pulses  Gait:  Antalgic  Studies Reviewed  I have personally reviewed pertinent films in PACS  XR of left knee - 2/26/21 images show tricompartmental OA with severe narrowing  XR of right knee - 5/4/18 images show mild OA with mild joint narrowing  Large joint arthrocentesis: R knee  Universal Protocol:  Consent: Verbal consent obtained    Consent given by: patient    Supporting Documentation  Indications: pain   Procedure Details  Location: knee - R knee  Needle size: 18 G  Approach: lateral  Medications administered: 6 mL bupivacaine (PF) 0 5 %; 1 mL methylPREDNISolone acetate 40 mg/mL    Aspirate amount: 30 mL  Aspirate: clear, serous and blood-tinged    Patient tolerance: patient tolerated the procedure well with no immediate complications  Dressing:  Sterile dressing applied    Large joint arthrocentesis: L knee  Universal Protocol:  Consent: Verbal consent obtained  Consent given by: patient    Supporting Documentation  Indications: pain   Procedure Details  Location: knee - L knee  Needle size: 25 G  Approach: anterolateral  Medications administered: 6 mL bupivacaine (PF) 0 5 %; 1 mL methylPREDNISolone acetate 40 mg/mL    Patient tolerance: patient tolerated the procedure well with no immediate complications  Dressing:  Sterile dressing applied              Medical, Surgical, Family, and Social History  The patient's medical history, family history, and social history, were reviewed and updated as appropriate      Past Medical History:   Diagnosis Date   • Arthritis    • Chronic pain    • Fibromyalgia, primary    • Headache(784 0) Always   • Hiatal hernia    • High cholesterol    • History of transfusion     Years ago   • Hypertension        Past Surgical History:   Procedure Laterality Date   •  SECTION     • CHOLECYSTECTOMY     • FL GUIDED NEEDLE PLAC BX/ASP/INJ  10/27/2021   • HERNIA REPAIR     • KIDNEY SURGERY     • LIVER SURGERY         Family History   Problem Relation Age of Onset   • Cancer Mother    • Diabetes Father    • Heart disease Father    • Cancer Father        Social History     Occupational History   • Not on file   Tobacco Use   • Smoking status: Current Every Day Smoker     Packs/day: 0 10     Years: 0 00     Pack years: 0 00     Types: Cigarettes   • Smokeless tobacco: Never Used   • Tobacco comment: in process of quitting 21   Vaping Use   • Vaping Use: Never used   Substance and Sexual Activity   • Alcohol use: No   • Drug use: No   • Sexual activity: Not on file       No Known Allergies      Current Outpatient Medications:   •  acetaminophen (TYLENOL) 325 mg tablet, Take 650 mg by mouth every 6 (six) hours as needed for mild pain, Disp: , Rfl:   •  albuterol (PROVENTIL HFA,VENTOLIN HFA) 90 mcg/act inhaler, Inhale 2 puffs as needed , Disp: , Rfl:   •  amLODIPine (NORVASC) 10 mg tablet, Take 10 mg by mouth daily, Disp: , Rfl:   •  atorvastatin (LIPITOR) 40 mg tablet, Take 40 mg by mouth daily , Disp: , Rfl:   •  budesonide-formoterol (SYMBICORT) 160-4 5 mcg/act inhaler, Inhale 2 puffs 2 (two) times a day, Disp: , Rfl:   •  cefdinir (OMNICEF) 300 mg capsule, Take 300 mg by mouth 2 (two) times a day, Disp: , Rfl:   •  diclofenac (VOLTAREN) 75 mg EC tablet, Take 1 tablet (75 mg total) by mouth 2 (two) times a day, Disp: 60 tablet, Rfl: 6  •  DULoxetine (CYMBALTA) 30 mg delayed release capsule, TAKE 1 CAPSULE DAILY, Disp: 30 capsule, Rfl: 6  •  DULoxetine (CYMBALTA) 60 mg delayed release capsule, Take 1 PO QD , Disp: 30 capsule, Rfl: 6  •  ergocalciferol (VITAMIN D2) 50,000 units, Take 1 capsule (50,000 Units total) by mouth once a week, Disp: 12 capsule, Rfl: 2  •  fluticasone-umeclidinium-vilanterol (Trelegy Ellipta) 200-62 5-25 MCG/INH AEPB inhaler, Inhale 1 puff daily, Disp: , Rfl:   •  hydrocortisone 2 5 % ointment, Apply topically 2 (two) times a day, Disp: 30 g, Rfl: 3  •  lisinopril-hydrochlorothiazide (PRINZIDE,ZESTORETIC) 20-25 MG per tablet, TAKE 1 TABLET BY MOUTH DAILY   PLEASE NOTE DOSE CHANGE, Disp: , Rfl:   •  metFORMIN (GLUCOPHAGE) 500 mg tablet, Take 500 mg by mouth 2 (two) times a day with meals, Disp: , Rfl:   •  metroNIDAZOLE (FLAGYL) 500 mg tablet, TAKE 1 TABLET BY MOUTH TWICE A DAY FOR 7 DAYS, Disp: , Rfl:   •  montelukast (SINGULAIR) 10 mg tablet, TAKE 1 TABLET BY MOUTH EVERY DAY AT NIGHT, Disp: , Rfl:   •  pantoprazole (PROTONIX) 40 mg tablet, Take 40 mg by mouth 2 (two) times a day, Disp: , Rfl:   •  pregabalin (LYRICA) 100 mg capsule, Take 1 capsule (100 mg total) by mouth 3 (three) times a day, Disp: 90 capsule, Rfl: 6  •  Semaglutide,0 25 or 0 5MG/DOS, 2 MG/1 5ML SOPN, Inject 0 25 mg under the skin Once a week, Disp: , Rfl:   •  tiZANidine (ZANAFLEX) 2 mg tablet, Take 1 tab in the morning, 1 tab in the afternoon, and 2 tabs at bedtime, Disp: 360 tablet, Rfl: 6  •  Diclofenac Sodium (VOLTAREN) 1 %, Apply 2 g topically 4 (four) times a day (Patient not taking: No sig reported), Disp: 2 g, Rfl: 2  •  fluticasone-salmeterol (Advair) 250-50 mcg/dose inhaler, Inhale 1 puff 2 (two) times a day, Disp: , Rfl:   •  lidocaine (Lidoderm) 5 %, Apply 1 patch topically daily Remove & Discard patch within 12 hours or as directed by MD, Disp: 90 patch, Rfl: 0      Candy Palomares PA-C    Scribe Attestation    I,:   am acting as a scribe while in the presence of the attending physician :       I,:   personally performed the services described in this documentation    as scribed in my presence :

## 2022-10-18 ENCOUNTER — APPOINTMENT (OUTPATIENT)
Dept: PHYSICAL THERAPY | Age: 47
End: 2022-10-18

## 2022-10-21 ENCOUNTER — OFFICE VISIT (OUTPATIENT)
Dept: PHYSICAL THERAPY | Age: 47
End: 2022-10-21
Payer: COMMERCIAL

## 2022-10-21 DIAGNOSIS — M54.50 CHRONIC BILATERAL LOW BACK PAIN WITHOUT SCIATICA: ICD-10-CM

## 2022-10-21 DIAGNOSIS — G89.29 CHRONIC BILATERAL LOW BACK PAIN WITHOUT SCIATICA: ICD-10-CM

## 2022-10-21 DIAGNOSIS — M25.50 POLYARTHRALGIA: Primary | ICD-10-CM

## 2022-10-21 PROCEDURE — 97113 AQUATIC THERAPY/EXERCISES: CPT

## 2022-10-21 NOTE — PROGRESS NOTES
Daily Note     Today's date: 10/21/2022  Patient name: Myriam Owens  : 1975  MRN: [id number]  Referring provider: Padmini Clements MD  Dx:   Encounter Diagnosis     ICD-10-CM    1  Polyarthralgia  M25 50    2  Chronic bilateral low back pain without sciatica  M54 50     G89 29                   Subjective: Pt reports 9/10 pain  Pain is diffsue in LB and B knees  Also notes electric shocks into her hips  Pt feels she likes water but noted during previous therapies she would a lot of delayed pain      Objective: See treatment diary below      Assessment: Tolerated treatment Fair  Spent time implementing ex with regards paid to technique and posture  No change in pain at 9/10 with ex but pt does like ease of ex in water  Helps to walk/move with ex as pain is painful with static standing  Will need to monitor pt for any delayed pain response and adjust ex as needed   Also warned pt that it may take two weeks of sessions to tell if pool may be effective to reduce pain      Plan: Cont PT per LPT plan     Precautions: 2 unit charge 2* 20% co-insurance    POOL  Manuals 10/21                                       Neuro Re-Ed                                                                Ther Ex                Lap walking 5 laps               Seated ham stretch [id number]       SKTC [id number]       LAQ                SLR x 3 x15       HR x15               Rows and extension Paddles x20       Horizontal abd/add Paddles x20       Abd/add         shldflex & abd  Paddles x20       Biceps curls- paddles x20       squats        sidestepping x3       bwd walks x3       Bench cycling                        Ther Activity                        Gait Training                        Modalities                        Progress as able   Add paddles when ready This note was copied from the mother's chart.  Lactation Rounds:     Infants weight loss wnl. Infants intake and output wnl. Reinforced infant feeding & output pattern, cue based feeds & unrestricted access to the breast. Hand expression reviewed, mother able to return demonstrate. Lactation discharge booklet reviewed.  Mother is aware of warm line, outpatient consultations, community resources and monthly support groups. Encouraged mother to contact lactation with any questions, concerns, or problems. Contact numbers provided, and mother verbalizes understanding.      10/15/18 1045   Infant Assessment   Weight Loss (%) -4.3   Number of Stools (24 hours) 1   Number of Voids (24 hours) 1   Maternal Infant Feeding   Breastfeeding Education adequate infant intake;adequate milk volume;diet;importance of skin-to-skin contact;increasing milk supply;label/storage of breast milk;milk expression, hand;medication effects   Lactation Interventions   Attachment Promotion privacy provided;breastfeeding assistance provided;face-to-face positioning promoted;family involvement promoted;infant-mother separation minimized;role responsibility promoted;rooming-in promoted;skin-to-skin contact encouraged   Breastfeeding Assistance feeding cue recognition promoted;feeding on demand promoted;support offered   Maternal Breastfeeding Support diary/feeding log utilized;lactation counseling provided;infant-mother separation minimized;maternal hydration promoted;maternal nutrition promoted;maternal rest encouraged

## 2022-10-24 ENCOUNTER — OFFICE VISIT (OUTPATIENT)
Dept: PHYSICAL THERAPY | Age: 47
End: 2022-10-24
Payer: COMMERCIAL

## 2022-10-24 DIAGNOSIS — M25.50 POLYARTHRALGIA: Primary | ICD-10-CM

## 2022-10-24 DIAGNOSIS — G89.29 CHRONIC BILATERAL LOW BACK PAIN WITHOUT SCIATICA: ICD-10-CM

## 2022-10-24 DIAGNOSIS — M54.50 CHRONIC BILATERAL LOW BACK PAIN WITHOUT SCIATICA: ICD-10-CM

## 2022-10-24 PROCEDURE — 97113 AQUATIC THERAPY/EXERCISES: CPT

## 2022-10-24 NOTE — PROGRESS NOTES
Daily Note     Today's date: 10/24/2022  Patient name: Esme Fletcher  : 1975  MRN: [id number]  Referring provider: Theodora Hopson MD  Dx:   Encounter Diagnosis     ICD-10-CM    1  Polyarthralgia  M25 50    2  Chronic bilateral low back pain without sciatica  M54 50     G89 29                   Subjective: Pt reports 9/10 pain  Pain is worst in B hips  Pt notes having bad inflammatory day due to weather and feels her knees are filled with fluid  Pt felt she did have soreness and swelling in her legs but feels it is similar reaction to therapy before    Objective: See treatment diary below      Assessment: Tolerated treatment Fair  No change in pain at 910 with ex but pt does like ease of ex in water  Helps to walk/move with ex as pain is painful with static standing  Pt struggles with ex due to pain but is wiiiling to try ex  Will need to monitor pt for any delayed pain response and adjust ex as needed   Also warned pt that it may take a few weeks of sessions to tell if pool may be effective to reduce pain      Plan: Cont PT per LPT plan     Precautions: 2 unit charge 2* 20% co-insurance    POOL  Manuals 10/21 10/24                                      Neuro Re-Ed                                                                Ther Ex                Lap walking 5 laps 5 laps              Seated ham stretch [id number]      SKTC [id number]      LAQ                SLR x 3 x15 x15      HR x15 x15              Rows and extension Paddles x20 Paddles x20      Horizontal abd/add Paddles x20 Paddles x20      Abd/add         shldflex & abd  Paddles x20 Paddles x20      Biceps curls- paddles x20 Paddles x20      squats        sidestepping x3 x3      bwd walks x3 x3      Bench cycling  3 min at bench                      Ther Activity                        Gait Training                        Modalities                        Progress as able   Add paddles when ready

## 2022-10-25 ENCOUNTER — HOSPITAL ENCOUNTER (OUTPATIENT)
Dept: MRI IMAGING | Facility: HOSPITAL | Age: 47
Discharge: HOME | End: 2022-10-25
Attending: PHYSICAL MEDICINE & REHABILITATION

## 2022-10-25 DIAGNOSIS — M54.41 CHRONIC BILATERAL LOW BACK PAIN WITH BILATERAL SCIATICA: ICD-10-CM

## 2022-10-25 DIAGNOSIS — M54.42 CHRONIC BILATERAL LOW BACK PAIN WITH BILATERAL SCIATICA: ICD-10-CM

## 2022-10-25 DIAGNOSIS — G89.29 CHRONIC BILATERAL LOW BACK PAIN WITH BILATERAL SCIATICA: ICD-10-CM

## 2022-10-25 DIAGNOSIS — M54.16 LUMBAR RADICULITIS: ICD-10-CM

## 2022-10-31 ENCOUNTER — OFFICE VISIT (OUTPATIENT)
Dept: PHYSICAL THERAPY | Age: 47
End: 2022-10-31

## 2022-10-31 DIAGNOSIS — M25.50 POLYARTHRALGIA: Primary | ICD-10-CM

## 2022-10-31 DIAGNOSIS — G89.29 CHRONIC BILATERAL LOW BACK PAIN WITHOUT SCIATICA: ICD-10-CM

## 2022-10-31 DIAGNOSIS — M54.50 CHRONIC BILATERAL LOW BACK PAIN WITHOUT SCIATICA: ICD-10-CM

## 2022-10-31 NOTE — PROGRESS NOTES
Daily Note     Today's date: 10/31/2022  Patient name: Quyen Pruitt  : 1975  MRN: [id number]  Referring provider: Amarilis Rascon MD  Dx:   Encounter Diagnosis     ICD-10-CM    1  Polyarthralgia  M25 50    2  Chronic bilateral low back pain without sciatica  M54 50     G89 29                   Subjective: Pt reports 8/10 pain  Pain is worst in B hips  Pt  feels her knees are filled with fluid  Pt felt she did have soreness and swelling in her legs but feels it is similar reaction to therapy before  Objective: See treatment diary below      Assessment: Tolerated treatment Fair  Pain was slightly better with and pt does like ease of ex in water  Helps to walk/move with ex as pain is painful with static standing  Pt struggles with ex due to pain but is willing to try ex  Will need to monitor pt for any delayed pain response and adjust ex as needed   Also still warned pt that it may take a few weeks of sessions to tell if pool may be effective to reduce pain      Plan: Cont PT per LPT plan     Precautions: 2 unit charge 2* 20% co-insurance    POOL  Manuals 10/21 10/24 10/31                                     Neuro Re-Ed                                                                Ther Ex                Lap walking 5 laps 5 laps 5 laps             Seated ham stretch [id number]     SKTC [id number]     LAQ                SLR x 3 x15 x15 x20     HR x15 x15 x20             Rows and extension Paddles x20 Paddles x20 Paddles x20     Horizontal abd/add Paddles x20 Paddles x20 Paddles x20     Abd/add         shldflex & abd  Paddles x20 Paddles x20 Paddles x20     Biceps curls- paddles x20 Paddles x20 Paddles x20     squats        sidestepping x3 x3 x3     bwd walks x3 x3 x3     Bench cycling  3 min at bench X 3min at bench                     Ther Activity                        Gait Training                        Modalities                        Progress as able   Add paddles when ready

## 2022-11-01 ENCOUNTER — APPOINTMENT (OUTPATIENT)
Dept: PHYSICAL THERAPY | Age: 47
End: 2022-11-01

## 2022-11-07 ENCOUNTER — TELEPHONE (OUTPATIENT)
Dept: OBGYN CLINIC | Facility: HOSPITAL | Age: 47
End: 2022-11-07

## 2022-11-07 ENCOUNTER — TELEPHONE (OUTPATIENT)
Dept: PAIN MEDICINE | Facility: MEDICAL CENTER | Age: 47
End: 2022-11-07

## 2022-11-07 ENCOUNTER — OFFICE VISIT (OUTPATIENT)
Dept: PHYSICAL THERAPY | Age: 47
End: 2022-11-07

## 2022-11-07 DIAGNOSIS — M25.50 POLYARTHRALGIA: Primary | ICD-10-CM

## 2022-11-07 DIAGNOSIS — M54.50 CHRONIC BILATERAL LOW BACK PAIN WITHOUT SCIATICA: ICD-10-CM

## 2022-11-07 DIAGNOSIS — G89.29 CHRONIC BILATERAL LOW BACK PAIN WITHOUT SCIATICA: ICD-10-CM

## 2022-11-07 NOTE — TELEPHONE ENCOUNTER
RN attempted to reach pt regarding previous  Cannot leave [facility] due to message stating that the pt is not available try later  RN sent a pt advice request as well

## 2022-11-07 NOTE — TELEPHONE ENCOUNTER
Caller: Patient    Doctor: Dr James Vásquez    Reason for call: Patient applied for handicap placard and paperwork was filled out by Dr James Vásquez  It was recently sent back stating that the medical license number was incorrect  The license number on her form was [id number]  She is asking if a new form can be filled out and license number verified? She is asking if filled out form can be mailed to her      Call back#: [phone]

## 2022-11-07 NOTE — TELEPHONE ENCOUNTER
Caller: patient     Doctor: Sharmaine [place]    Reason for call: request to know the status on possible injection     Call back#: [phone]

## 2022-11-07 NOTE — TELEPHONE ENCOUNTER
Pt took the 11/10 appt and the place of service was changed per [facility] dept  Pt appreciative of same

## 2022-11-07 NOTE — PROGRESS NOTES
Daily Note     Today's date: 2022  Patient name: Matilda Harris  : 1975  MRN: [id number]  Referring provider: Tamia Palomino MD  Dx:   Encounter Diagnosis     ICD-10-CM    1  Polyarthralgia  M25 50    2  Chronic bilateral low back pain without sciatica  M54 50     G89 29                   Subjective: Pt reports 8/10 pain  Pain is worst in B hips and knees  Once again feels like her legs are swollen    Objective: See treatment diary below      Assessment: Tolerated treatment Fair  Pain was slightly better with and pt does like ease of ex in water  Pt is motivated with session and wants to try to exercise despite pain  Pt feels fatigue after ex but pain still continues at 7/10        Plan: Cont PT per LPT plan     Precautions: 2 unit charge 2* 20% co-insurance    POOL  Manuals 10/21 10/24 10/31 11/7                                    Neuro Re-Ed                                                                Ther Ex                Lap walking 5 laps 5 laps 5 laps 5 laps            Seated ham stretch [id number]    SKTC [id number]    LAQ        Turtle stretch    x10 ea dir    SLR x 3 x15 x15 x20 x20    HR x15 x15 x20 x20            Rows and extension Paddles x20 Paddles x20 Paddles x20 Paddles x20    Horizontal abd/add Paddles x20 Paddles x20 Paddles x20 Paddles x20    Abd/add         shldflex & abd  Paddles x20 Paddles x20 Paddles x20 Paddles x20    Biceps curls- paddles x20 Paddles x20 Paddles x20 Paddles x20    squats        sidestepping x3 x3 x3 x3    bwd walks x3 x3 x3 x3    Bench cycling  3 min at bench X 3min at bench X 5 min on pony    Pony float    x5 min            Ther Activity                        Gait Training                        Modalities                        Progress as able   Add paddles when ready

## 2022-11-07 NOTE — TELEPHONE ENCOUNTER
RN s/w pt regarding previous  Pt appreciative of rescheduling to a wider bore MRI for 11/10 at [facility] for 2:30pm  Pt appreciative of same and the [address] notified so that the place of service can be changed as well

## 2022-11-07 NOTE — TELEPHONE ENCOUNTER
Caller: Galileo Wilkins    Doctor: Sharyle Lombard    Reason for call: Patient returning nurses call-transferred to clinical

## 2022-11-07 NOTE — TELEPHONE ENCOUNTER
RN s/w pt and investigated appts that can be made for MRI with wide Bore machine so that pt would be able to be accommodated  Sent a pt advice request for pt to call back

## 2022-11-10 ENCOUNTER — APPOINTMENT (OUTPATIENT)
Dept: PHYSICAL THERAPY | Age: 47
End: 2022-11-10

## 2022-11-10 ENCOUNTER — HOSPITAL ENCOUNTER (OUTPATIENT)
Dept: RADIOLOGY | Age: 47
Discharge: HOME | End: 2022-11-10
Attending: PHYSICAL MEDICINE & REHABILITATION

## 2022-11-14 ENCOUNTER — OFFICE VISIT (OUTPATIENT)
Dept: PHYSICAL THERAPY | Age: 47
End: 2022-11-14

## 2022-11-14 DIAGNOSIS — M25.50 POLYARTHRALGIA: Primary | ICD-10-CM

## 2022-11-14 DIAGNOSIS — G89.29 CHRONIC BILATERAL LOW BACK PAIN WITHOUT SCIATICA: ICD-10-CM

## 2022-11-14 DIAGNOSIS — M54.50 CHRONIC BILATERAL LOW BACK PAIN WITHOUT SCIATICA: ICD-10-CM

## 2022-11-14 NOTE — PROGRESS NOTES
Daily Note     Today's date: 2022  Patient name: Latoya Lam  : 1975  MRN: [id number]  Referring provider: Yesy Gruber MD  Dx:   Encounter Diagnosis     ICD-10-CM    1  Polyarthralgia  M25 50    2  Chronic bilateral low back pain without sciatica  M54 50     G89 29                   Subjective: Pt reports 9/10 pain  Pain is worst in B hips and knees  Once again feels like her legs are swollen  Pain is especially diffuse today all over her body per her report making it hard to walk    Objective: See treatment diary below      Assessment: Tolerated treatment Fair  Pain was slightly better with and pt does like ease of ex in water  Pt is motivated with session and wants to try to exercise despite pain  Pt works through reps with ex as she knows it is only place she can exercise and be mobile  Pt feels fatigue after ex but pain still continues at 8/10        Plan: Cont PT per LPT plan     Precautions: 2 unit charge 2* 20% co-insurance    POOL  Manuals 10/21 10/24 10/31 11/7 11/14                                   Neuro Re-Ed                                                                Ther Ex                Lap walking 5 laps 5 laps 5 laps 5 laps 5 laps           Seated ham stretch [id number] 51bpul21 02mxcq68 [id number]   SKTC [id number] 55twcd30 56qfbq90 52hbiz09   LAQ        Turtle stretch    x10 ea dir x10 ea dir   SLR x 3 x15 x15 x20 x20 x15   HR x15 x15 x20 x20 x15           Rows and extension Paddles x20 Paddles x20 Paddles x20 Paddles x20 Paddles x20   Horizontal abd/add Paddles x20 Paddles x20 Paddles x20 Paddles x20 Paddles x20   Abd/add         shldflex & abd  Paddles x20 Paddles x20 Paddles x20 Paddles x20 Paddles x20   Biceps curls- paddles x20 Paddles x20 Paddles x20 Paddles x20 Paddles x20   squats        sidestepping x3 x3 x3 x3 x5   bwd walks x3 x3 x3 x3 x5   Bench cycling  3 min at bench X 3min at bench X 5 min on pony x5 min on pony   Pony float    x5 min x5 Ther Activity                        Gait Training                        Modalities                        Progress as able   Add paddles when ready

## 2022-11-17 ENCOUNTER — APPOINTMENT (OUTPATIENT)
Dept: PHYSICAL THERAPY | Age: 47
End: 2022-11-17

## 2022-11-18 ENCOUNTER — TELEPHONE (OUTPATIENT)
Dept: PAIN MEDICINE | Facility: MEDICAL CENTER | Age: 47
End: 2022-11-18

## 2022-11-18 NOTE — TELEPHONE ENCOUNTER
Attempted to reach pt  Received a message that the wireless customer you are trying to reach is not available now

## 2022-11-18 NOTE — TELEPHONE ENCOUNTER
S/w pt, advised of previous notation  Pt agreeable to injection, denies any blood thinning medication  Please reach out to schedule, thank you

## 2022-11-18 NOTE — TELEPHONE ENCOUNTER
Jasbir Estevez, DO  [facility]  I've reviewed her MRI, we could try an LESI to see if that helps with some of her symptoms   If that doesn't help we may need to look into a neurology consultation to help address the etiology of these symptoms  Please schedule LESI with me if interested

## 2022-11-18 NOTE — TELEPHONE ENCOUNTER
Caller: pt    Doctor:     Reason for call: Caller transferred to nurse       Call back#: [phone]

## 2022-11-21 ENCOUNTER — APPOINTMENT (OUTPATIENT)
Dept: PHYSICAL THERAPY | Age: 47
End: 2022-11-21

## 2022-11-28 ENCOUNTER — OFFICE VISIT (OUTPATIENT)
Dept: PHYSICAL THERAPY | Age: 47
End: 2022-11-28

## 2022-11-28 DIAGNOSIS — M54.50 CHRONIC BILATERAL LOW BACK PAIN WITHOUT SCIATICA: ICD-10-CM

## 2022-11-28 DIAGNOSIS — M25.50 POLYARTHRALGIA: Primary | ICD-10-CM

## 2022-11-28 DIAGNOSIS — G89.29 CHRONIC BILATERAL LOW BACK PAIN WITHOUT SCIATICA: ICD-10-CM

## 2022-11-28 NOTE — PROGRESS NOTES
Daily Note     Today's date: 2022  Patient name: Mihaela Xiong  : 1975  MRN: [id number]  Referring provider: Kaylin Schreiber MD  Dx:   Encounter Diagnosis     ICD-10-CM    1  Polyarthralgia  M25 50       2  Chronic bilateral low back pain without sciatica  M54 50     G89 29                      Subjective: Pt reports 9/10 pain  Pain is worst in B hips and knees  Once again feels like her legs are swollen  Pain is especially diffuse today all over her body per her report making it hard to walk  Complaints are consistent with each session  Pt notes she does have pain reduction in water but that relief does not last    Objective: See treatment diary below      Assessment: Tolerated treatment Fair  Pain was slightly better with and pt does like ease of ex in water  Pt is motivated with session and wants to try to exercise despite pain  Pt works through reps with ex as she knows it is only place she can exercise and be mobile  Pt feels fatigue after ex but pain still continues at 8/10        Plan: Cont PT per LPT plan     Precautions: 2 unit charge 2* 20% co-insurance    POOL  Manuals 11/28 10/24 10/31 11/7 11/14                                   Neuro Re-Ed                                                                Ther Ex                Lap walking 5 laps 5 laps 5 laps 5 laps 5 laps           Seated ham stretch [id number] 44kyaf90 58qidn39 51mjrr34   SKTC [id number] 47oaof19 47hbba47 93warq53 27yoiu20   LAQ        Turtle stretch x10 ea dir   x10 ea dir x10 ea dir   SLR x 3 x20 x15 x20 x20 x15   HR x20 x15 x20 x20 x15           Rows and extension Paddles x20 Paddles x20 Paddles x20 Paddles x20 Paddles x20   Horizontal abd/add Paddles x20 Paddles x20 Paddles x20 Paddles x20 Paddles x20   Abd/add         shldflex & abd  Paddles x20 Paddles x20 Paddles x20 Paddles x20 Paddles x20   Biceps curls- paddles Paddles x20 Paddles x20 Paddles x20 Paddles x20 Paddles x20   squats        sidestepping x5 x3 x3 x3 x5   bwd walks x5 x3 x3 x3 x5   Bench cycling X 5min on pony 3 min at bench X 3min at bench X 5 min on pony x5 min on pony   Pony float X 5min   x5 min x5           Ther Activity                        Gait Training                        Modalities                        Progress as able   Add paddles when ready

## 2023-01-13 ENCOUNTER — OFFICE VISIT (OUTPATIENT)
Dept: OBGYN CLINIC | Facility: MEDICAL CENTER | Age: 48
End: 2023-01-13

## 2023-01-13 VITALS
DIASTOLIC BLOOD PRESSURE: 76 MMHG | HEART RATE: 98 BPM | HEIGHT: 67 IN | WEIGHT: 293 LBS | BODY MASS INDEX: 45.99 KG/M2 | SYSTOLIC BLOOD PRESSURE: 119 MMHG

## 2023-01-13 DIAGNOSIS — M17.0 PRIMARY OSTEOARTHRITIS OF KNEES, BILATERAL: Primary | ICD-10-CM

## 2023-01-13 RX ORDER — BUPIVACAINE HYDROCHLORIDE 5 MG/ML
6 INJECTION, SOLUTION EPIDURAL; INTRACAUDAL
Status: COMPLETED | OUTPATIENT
Start: 2023-01-13 | End: 2023-01-13

## 2023-01-13 RX ORDER — METHYLPREDNISOLONE ACETATE 40 MG/ML
1 INJECTION, SUSPENSION INTRA-ARTICULAR; INTRALESIONAL; INTRAMUSCULAR; SOFT TISSUE
Status: COMPLETED | OUTPATIENT
Start: 2023-01-13 | End: 2023-01-13

## 2023-01-13 RX ADMIN — BUPIVACAINE HYDROCHLORIDE 6 ML: 5 INJECTION, SOLUTION EPIDURAL; INTRACAUDAL at 14:18

## 2023-01-13 RX ADMIN — METHYLPREDNISOLONE ACETATE 1 ML: 40 INJECTION, SUSPENSION INTRA-ARTICULAR; INTRALESIONAL; INTRAMUSCULAR; SOFT TISSUE at 14:18

## 2023-01-13 NOTE — PROGRESS NOTES
Orthopaedic Surgery - Office Note  Bernardo Crowley (12 y o  female)   : 1975   MRN: [id number]  Encounter Date: 2023    Chief Complaint   Patient presents with   • Left Knee - Follow-up   • Right Knee - Follow-up     Assessment / Plan  Bilateral knee osteoarthritis     · We had another discussion about the patient's weight loss journey  Her insurance continues to deny any attempts to pursue weight loss surgery  We do continue with the recommendation to lose weight, which will help her knee pain as well as allow her to quality for TKA if/when she desires to move forward to surgical treatment of her bilateral knee pain  She states that she is currently changing insurance through her 's work in the near future and at that time may explore options that include weight loss surgery  · CSI of both knees were performed  · Activity as tolerated  · ROM without restrictions  · Anti-inflammatories or Tylenol prn pain  Return in about 3 months (around 2023) for follow up with Devota Phalen  History of Present Illness  Bernardo Crowley is a 50 y o  female who presents for follow up evaluation of her bilateral knee osteoarthritis  She complains of severe pain bilaterally, worse in the right recently  This pain is affected her quality of life and does not allow her to work, play with her grandchild, or do the activities she typically likes to do  She also complains of back and hip pain, for which she sees [facility] as well  She is currently pursuing epidural injections for her back with Dr Mak Horne which is scheduled to be performed soon  She reports she has gained 100 lb since her knee pain started  She has tried to pursue bariatric surgery/weight management but has continued to run into insurance issues  She reports CSI of the knees typically give her some temporary relief  Her last injection for both knees was in on 10/14/2022 which did help    She did have viscosupplementation in the past which she feels did not make any difference  She would like to continue with steroid injections at this time  Review of Systems  Pertinent items are noted in HPI  All other systems were reviewed and are negative  Physical Exam  /76   Pulse 98   Ht 5' 7" (1 702 m)   Wt 136 kg (300 lb)   BMI 46 99 kg/m²   Cons: Appears well  No apparent distress  Psych: Alert  Oriented x3  Mood and affect normal   Eyes: PERRLA, EOMI  Resp: Normal effort  No audible wheezing or stridor  CV: Palpable pulse  No discernable arrhythmia  No LE edema  Lymph:  No palpable cervical, axillary, or inguinal lymphadenopathy  Skin: Warm  No palpable masses  No visible lesions  Neuro: Normal muscle tone  Normal and symmetric DTR's  Bilateral Knee Exam  Alignment:  Bilateral genu varus  Inspection:  Moderate swelling  Palpation:  Moderate effusion to right knee  ROM:  Knee Extension 15°  Knee Flexion 110°  Strength:  Not tested  Stability:  Not tested  Tests:  No pertinent positive or negative tests  Patella:  Patella tracks centrally with crepitus  Neurovascular:  Sensation intact in DP/SP/George/Sa/T nerve distributions  Palpable DP & PT pulses  Gait:  Antalgic  Studies Reviewed  I have personally reviewed pertinent films in PACS  XR of left knee - 2/26/21 images show tricompartmental OA with severe narrowing  XR of right knee - 5/4/18 images show mild OA with mild joint narrowing  Large joint arthrocentesis: bilateral knee  Universal Protocol:  Consent: Verbal consent obtained    Consent given by: patient    Procedure Details  Location: knee - bilateral knee  Needle size: 25 G  Approach: lateral    Medications (Right): 6 mL bupivacaine (PF) 0 5 %; 1 mL methylPREDNISolone acetate 40 mg/mLMedications (Left): 6 mL bupivacaine (PF) 0 5 %; 1 mL methylPREDNISolone acetate 40 mg/mL   Patient tolerance: patient tolerated the procedure well with no immediate complications  Dressing:  Sterile dressing applied              Medical, Surgical, Family, and Social History  The patient's medical history, family history, and social history, were reviewed and updated as appropriate      Past Medical History:   Diagnosis Date   • Arthritis    • Chronic pain    • Fibromyalgia, primary    • Headache(784 0) Always   • Hiatal hernia    • High cholesterol    • History of transfusion     Years ago   • Hypertension        Past Surgical History:   Procedure Laterality Date   •  SECTION     • CHOLECYSTECTOMY     • FL GUIDED NEEDLE PLAC BX/ASP/INJ  10/27/2021   • HERNIA REPAIR     • KIDNEY SURGERY     • LIVER SURGERY         Family History   Problem Relation Age of Onset   • Cancer Mother    • Diabetes Father    • Heart disease Father    • Cancer Father        Social History     Occupational History   • Not on file   Tobacco Use   • Smoking status: Every Day     Packs/day: 0 10     Years: 0 00     Pack years: 0 00     Types: Cigarettes   • Smokeless tobacco: Never   • Tobacco comments:     in process of quitting 21   Vaping Use   • Vaping Use: Never used   Substance and Sexual Activity   • Alcohol use: No   • Drug use: No   • Sexual activity: Not on file       No Known Allergies      Current Outpatient Medications:   •  acetaminophen (TYLENOL) 325 mg tablet, Take 650 mg by mouth every 6 (six) hours as needed for mild pain, Disp: , Rfl:   •  albuterol (PROVENTIL HFA,VENTOLIN HFA) 90 mcg/act inhaler, Inhale 2 puffs as needed , Disp: , Rfl:   •  amLODIPine (NORVASC) 10 mg tablet, Take 10 mg by mouth daily, Disp: , Rfl:   •  atorvastatin (LIPITOR) 40 mg tablet, Take 40 mg by mouth daily , Disp: , Rfl:   •  diclofenac (VOLTAREN) 75 mg EC tablet, Take 1 tablet (75 mg total) by mouth 2 (two) times a day, Disp: 60 tablet, Rfl: 6  •  DULoxetine (CYMBALTA) 30 mg delayed release capsule, TAKE 1 CAPSULE DAILY, Disp: 30 capsule, Rfl: 6  •  DULoxetine (CYMBALTA) 60 mg delayed release capsule, Take 1 PO QD , Disp: 30 capsule, Rfl: 6  •  ergocalciferol (VITAMIN D2) 50,000 units, Take 1 capsule (50,000 Units total) by mouth once a week, Disp: 12 capsule, Rfl: 2  •  fluticasone-umeclidinium-vilanterol (Trelegy Ellipta) 200-62 5-25 MCG/INH AEPB inhaler, Inhale 1 puff daily, Disp: , Rfl:   •  hydrocortisone 2 5 % ointment, Apply topically 2 (two) times a day, Disp: 30 g, Rfl: 3  •  lisinopril-hydrochlorothiazide (PRINZIDE,ZESTORETIC) 20-25 MG per tablet, TAKE 1 TABLET BY MOUTH DAILY   PLEASE NOTE DOSE CHANGE, Disp: , Rfl:   •  metFORMIN (GLUCOPHAGE) 500 mg tablet, Take 500 mg by mouth 2 (two) times a day with meals, Disp: , Rfl:   •  metroNIDAZOLE (FLAGYL) 500 mg tablet, TAKE 1 TABLET BY MOUTH TWICE A DAY FOR 7 DAYS, Disp: , Rfl:   •  montelukast (SINGULAIR) 10 mg tablet, TAKE 1 TABLET BY MOUTH EVERY DAY AT NIGHT, Disp: , Rfl:   •  pantoprazole (PROTONIX) 40 mg tablet, Take 40 mg by mouth 2 (two) times a day, Disp: , Rfl:   •  pregabalin (LYRICA) 100 mg capsule, Take 1 capsule (100 mg total) by mouth 3 (three) times a day, Disp: 90 capsule, Rfl: 6  •  Semaglutide,0 25 or 0 5MG/DOS, 2 MG/1 5ML SOPN, Inject 0 25 mg under the skin Once a week, Disp: , Rfl:   •  tiZANidine (ZANAFLEX) 2 mg tablet, Take 1 tab in the morning, 1 tab in the afternoon, and 2 tabs at bedtime, Disp: 360 tablet, Rfl: 6  •  budesonide-formoterol (SYMBICORT) 160-4 5 mcg/act inhaler, Inhale 2 puffs 2 (two) times a day, Disp: , Rfl:   •  Diclofenac Sodium (VOLTAREN) 1 %, Apply 2 g topically 4 (four) times a day (Patient not taking: Reported on 10/6/2022), Disp: 2 g, Rfl: 2  •  fluticasone-salmeterol (Advair) 250-50 mcg/dose inhaler, Inhale 1 puff 2 (two) times a day, Disp: , Rfl:   •  lidocaine (Lidoderm) 5 %, Apply 1 patch topically daily Remove & Discard patch within 12 hours or as directed by MD, Disp: 90 patch, Rfl: 0      Rodger Isaac PA-C    Scribe Attestation    I,:   am acting as a scribe while in the presence of the attending physician :       I,:   personally performed the services described in this documentation    as scribed in my presence :

## 2023-01-17 ENCOUNTER — HOSPITAL ENCOUNTER (OUTPATIENT)
Dept: RADIOLOGY | Facility: MEDICAL CENTER | Age: 48
Discharge: HOME | End: 2023-01-17
Admitting: PHYSICAL MEDICINE & REHABILITATION

## 2023-01-17 VITALS
RESPIRATION RATE: 18 BRPM | TEMPERATURE: 98.6 F | OXYGEN SATURATION: 95 % | SYSTOLIC BLOOD PRESSURE: 107 MMHG | DIASTOLIC BLOOD PRESSURE: 68 MMHG | HEART RATE: 98 BPM

## 2023-01-17 DIAGNOSIS — M47.816 LUMBAR SPONDYLOSIS: ICD-10-CM

## 2023-01-17 RX ORDER — METHYLPREDNISOLONE ACETATE 80 MG/ML
80 INJECTION, SUSPENSION INTRA-ARTICULAR; INTRALESIONAL; INTRAMUSCULAR; PARENTERAL; SOFT TISSUE ONCE
Status: COMPLETED | OUTPATIENT
Start: 2023-01-17 | End: 2023-01-17

## 2023-01-17 RX ADMIN — IOHEXOL 2 ML: 300 INJECTION, SOLUTION INTRAVENOUS at 10:31

## 2023-01-17 RX ADMIN — METHYLPREDNISOLONE ACETATE 80 MG: 80 INJECTION, SUSPENSION INTRA-ARTICULAR; INTRALESIONAL; INTRAMUSCULAR; PARENTERAL; SOFT TISSUE at 10:31

## 2023-01-17 NOTE — DISCHARGE INSTRUCTIONS
Epidural Steroid Injection   WHAT YOU NEED TO KNOW:   An epidural steroid injection (MELISSA) is a procedure to inject steroid medicine into the epidural space  The epidural space is between your spinal cord and vertebrae  Steroids reduce inflammation and fluid buildup in your spine that may be causing pain  You may be given pain medicine along with the steroids  ACTIVITY  Do not drive or operate machinery today  No strenuous activity today - bending, lifting, etc   You may resume normal activites starting tomorrow - start slowly and as tolerated  You may shower today, but no tub baths or hot tubs  You may have numbness for several hours from the local anesthetic  Please use caution and common sense, especially with weight-bearing activities  CARE OF THE INJECTION SITE  If you have soreness or pain, apply ice to the area today (20 minutes on/20 minutes off)  Starting tomorrow, you may use warm, moist heat or ice if needed  You may have an increase or change in your discomfort for 36-48 hours after your treatment  Apply ice and continue with any pain medication you have been prescribed  Notify the [facility] if you have any of the following: redness, drainage, swelling, headache, stiff neck or fever above 100°F     SPECIAL INSTRUCTIONS  Our office will contact you in approximately 7 days for a progress report  MEDICATIONS  Continue to take all routine medications  Our office may have instructed you to hold some medications  As no general anesthesia was used in today's procedure, you should not experience any side effects related to anesthesia  If you are diabetic, the steroids used in today's injection may temporarily increase your blood sugar levels after the first few days after your injection  Please keep a close eye on your sugars and alert the doctor who manages your diabetes if your sugars are significantly high from your baseline or you are symptomatic       If you have a problem specifically related to your procedure, please call our office at [phone]  Problems not related to your procedure should be directed to your primary care physician

## 2023-01-17 NOTE — H&P
History of Present Illness:  The patient is a 50 y o  female who presents with complaints of back and leg pain    Past Medical History:   Diagnosis Date   • Arthritis    • Chronic pain    • Fibromyalgia, primary    • Headache(784 0) Always   • Hiatal hernia    • High cholesterol    • History of transfusion     Years ago   • Hypertension        Past Surgical History:   Procedure Laterality Date   •  SECTION     • CHOLECYSTECTOMY     • FL GUIDED NEEDLE PLAC BX/ASP/INJ  10/27/2021   • HERNIA REPAIR     • KIDNEY SURGERY     • LIVER SURGERY           Current Outpatient Medications:   •  acetaminophen (TYLENOL) 325 mg tablet, Take 650 mg by mouth every 6 (six) hours as needed for mild pain, Disp: , Rfl:   •  albuterol (PROVENTIL HFA,VENTOLIN HFA) 90 mcg/act inhaler, Inhale 2 puffs as needed , Disp: , Rfl:   •  amLODIPine (NORVASC) 10 mg tablet, Take 10 mg by mouth daily, Disp: , Rfl:   •  atorvastatin (LIPITOR) 40 mg tablet, Take 40 mg by mouth daily , Disp: , Rfl:   •  budesonide-formoterol (SYMBICORT) 160-4 5 mcg/act inhaler, Inhale 2 puffs 2 (two) times a day, Disp: , Rfl:   •  diclofenac (VOLTAREN) 75 mg EC tablet, Take 1 tablet (75 mg total) by mouth 2 (two) times a day, Disp: 60 tablet, Rfl: 6  •  Diclofenac Sodium (VOLTAREN) 1 %, Apply 2 g topically 4 (four) times a day (Patient not taking: Reported on 10/6/2022), Disp: 2 g, Rfl: 2  •  DULoxetine (CYMBALTA) 30 mg delayed release capsule, TAKE 1 CAPSULE DAILY, Disp: 30 capsule, Rfl: 6  •  DULoxetine (CYMBALTA) 60 mg delayed release capsule, Take 1 PO QD , Disp: 30 capsule, Rfl: 6  •  ergocalciferol (VITAMIN D2) 50,000 units, Take 1 capsule (50,000 Units total) by mouth once a week, Disp: 12 capsule, Rfl: 2  •  fluticasone-salmeterol (Advair) 250-50 mcg/dose inhaler, Inhale 1 puff 2 (two) times a day, Disp: , Rfl:   •  fluticasone-umeclidinium-vilanterol (Trelegy Ellipta) 200-62 5-25 MCG/INH AEPB inhaler, Inhale 1 puff daily, Disp: , Rfl:   •  hydrocortisone 2 5 % ointment, Apply topically 2 (two) times a day, Disp: 30 g, Rfl: 3  •  lidocaine (Lidoderm) 5 %, Apply 1 patch topically daily Remove & Discard patch within 12 hours or as directed by MD, Disp: 90 patch, Rfl: 0  •  lisinopril-hydrochlorothiazide (PRINZIDE,ZESTORETIC) 20-25 MG per tablet, TAKE 1 TABLET BY MOUTH DAILY  PLEASE NOTE DOSE CHANGE, Disp: , Rfl:   •  metFORMIN (GLUCOPHAGE) 500 mg tablet, Take 500 mg by mouth 2 (two) times a day with meals, Disp: , Rfl:   •  montelukast (SINGULAIR) 10 mg tablet, TAKE 1 TABLET BY MOUTH EVERY DAY AT NIGHT, Disp: , Rfl:   •  pantoprazole (PROTONIX) 40 mg tablet, Take 40 mg by mouth 2 (two) times a day, Disp: , Rfl:   •  pregabalin (LYRICA) 100 mg capsule, Take 1 capsule (100 mg total) by mouth 3 (three) times a day, Disp: 90 capsule, Rfl: 6  •  Semaglutide,0 25 or 0 5MG/DOS, 2 MG/1 5ML SOPN, Inject 0 25 mg under the skin Once a week, Disp: , Rfl:   •  tiZANidine (ZANAFLEX) 2 mg tablet, Take 1 tab in the morning, 1 tab in the afternoon, and 2 tabs at bedtime, Disp: 360 tablet, Rfl: 6    No Known Allergies    Physical Exam:   Vitals:    01/17/23 1018   BP: 112/77   Pulse: 88   Resp: 18   Temp: 98 6 °F (37 °C)   SpO2: 97%     General: Awake, Alert, Oriented x 3, Mood and affect appropriate  Respiratory: Respirations even and unlabored  Cardiovascular: Peripheral pulses intact; no edema  Musculoskeletal Exam: back and leg pain    ASA Score: 2    Patient/Chart Verification  Patient ID Verified: Verbal  Consents Confirmed: Procedural, To be obtained in the Pre-Procedure area  H&P( within 30 days) Verified: To be obtained in the Pre-Procedure area  Allergies Reviewed: Yes  Anticoag/NSAID held?: NA  Currently on antibiotics?: No  Pregnancy denied?: Yes    Assessment:   1   Lumbar spondylosis        Plan: FABRIZIO

## 2023-01-24 ENCOUNTER — TELEPHONE (OUTPATIENT)
Dept: RADIOLOGY | Facility: MEDICAL CENTER | Age: 48
End: 2023-01-24

## 2023-01-24 NOTE — TELEPHONE ENCOUNTER
Caller: Kalli(PT)    Doctor: Dr Lynsey Hutchinson    Reason for call: Pt called in to make sure we received the feedback from the procedure     Call back#: N/A

## 2023-01-24 NOTE — TELEPHONE ENCOUNTER
Caller: Rudy Groves  Doctor/office: Dr Fiorella Thompson   CB#: [phone]    % of improvement: 25%  Pain Scale (1-10):  8 1/2/10

## 2023-01-24 NOTE — TELEPHONE ENCOUNTER
1st attempt  Unable Lm to cb with % improvement and pain level  Call cannot be completed at this time

## 2023-04-28 ENCOUNTER — OFFICE VISIT (OUTPATIENT)
Dept: OBGYN CLINIC | Facility: MEDICAL CENTER | Age: 48
End: 2023-04-28

## 2023-04-28 VITALS
WEIGHT: 293 LBS | HEIGHT: 67 IN | SYSTOLIC BLOOD PRESSURE: 130 MMHG | BODY MASS INDEX: 45.99 KG/M2 | DIASTOLIC BLOOD PRESSURE: 84 MMHG | HEART RATE: 90 BPM

## 2023-04-28 DIAGNOSIS — M17.0 PRIMARY OSTEOARTHRITIS OF KNEES, BILATERAL: Primary | ICD-10-CM

## 2023-04-28 DIAGNOSIS — E11.9 TYPE 2 DIABETES MELLITUS WITHOUT COMPLICATION, WITHOUT LONG-TERM CURRENT USE OF INSULIN (HCC): ICD-10-CM

## 2023-04-28 RX ORDER — BUPIVACAINE HYDROCHLORIDE 2.5 MG/ML
4 INJECTION, SOLUTION INFILTRATION; PERINEURAL
Status: COMPLETED | OUTPATIENT
Start: 2023-04-28 | End: 2023-04-28

## 2023-04-28 RX ORDER — AZELASTINE HYDROCHLORIDE 137 UG/1
SPRAY, METERED NASAL
COMMUNITY
Start: 2023-04-24

## 2023-04-28 RX ORDER — METHYLPREDNISOLONE ACETATE 40 MG/ML
1 INJECTION, SUSPENSION INTRA-ARTICULAR; INTRALESIONAL; INTRAMUSCULAR; SOFT TISSUE
Status: COMPLETED | OUTPATIENT
Start: 2023-04-28 | End: 2023-04-28

## 2023-04-28 RX ORDER — FOLIC ACID 1 MG/1
1 TABLET ORAL DAILY
COMMUNITY
Start: 2023-02-13 | End: 2024-02-13

## 2023-04-28 RX ORDER — OFLOXACIN 3 MG/ML
1 SOLUTION/ DROPS OPHTHALMIC 4 TIMES DAILY
COMMUNITY
Start: 2023-01-19

## 2023-04-28 RX ORDER — METFORMIN HYDROCHLORIDE 500 MG/1
500 TABLET, EXTENDED RELEASE ORAL
COMMUNITY
Start: 2023-02-24 | End: 2023-04-28 | Stop reason: SDUPTHER

## 2023-04-28 RX ORDER — VARENICLINE TARTRATE 25 MG
KIT ORAL
COMMUNITY
Start: 2023-02-01

## 2023-04-28 RX ORDER — LANCETS 30 GAUGE
EACH MISCELLANEOUS
COMMUNITY
Start: 2023-04-21

## 2023-04-28 RX ORDER — OFLOXACIN 3 MG/ML
SOLUTION/ DROPS OPHTHALMIC
COMMUNITY
Start: 2023-01-19 | End: 2023-04-28 | Stop reason: SDUPTHER

## 2023-04-28 RX ORDER — BUPIVACAINE HYDROCHLORIDE 2.5 MG/ML
8 INJECTION, SOLUTION INFILTRATION; PERINEURAL
Status: COMPLETED | OUTPATIENT
Start: 2023-04-28 | End: 2023-04-28

## 2023-04-28 RX ORDER — VARENICLINE TARTRATE 1 MG/1
TABLET, FILM COATED ORAL
COMMUNITY
Start: 2023-03-13 | End: 2023-04-28 | Stop reason: SDUPTHER

## 2023-04-28 RX ADMIN — BUPIVACAINE HYDROCHLORIDE 4 ML: 2.5 INJECTION, SOLUTION INFILTRATION; PERINEURAL at 11:12

## 2023-04-28 RX ADMIN — METHYLPREDNISOLONE ACETATE 1 ML: 40 INJECTION, SUSPENSION INTRA-ARTICULAR; INTRALESIONAL; INTRAMUSCULAR; SOFT TISSUE at 11:12

## 2023-04-28 RX ADMIN — BUPIVACAINE HYDROCHLORIDE 8 ML: 2.5 INJECTION, SOLUTION INFILTRATION; PERINEURAL at 11:12

## 2023-04-28 NOTE — PROGRESS NOTES
Orthopaedic Surgery - Office Note  Gabrielle Renee (21 y o  female)   : 1975   MRN: [id number]  Encounter Date: 2023    Chief Complaint   Patient presents with   • Right Knee - Follow-up   • Left Knee - Follow-up     Assessment / Plan  Bilateral knee osteoarthritis     · We had another discussion about the patient's weight loss journey  She has lost 7 pounds since her last visit  Her insurance continues to deny any attempts to pursue weight loss surgery  We do continue with the recommendation to lose weight, which will help her knee pain as well as allow her to quality for TKA if/when she desires to move forward to surgical treatment of her bilateral knee pain  · CSI of both knees were performed after discussion of risks and benefits and aspiration was performed of the left knee  · Activity as tolerated  · ROM without restrictions  · Anti-inflammatories or Tylenol prn pain  Return in about 3 months (around 2023) for follow up with [facility]     History of Present Illness  Gabrielle Renee is a 50 y o  female who presents for follow up evaluation of her bilateral knee osteoarthritis  She complains of severe pain bilaterally, worse in the right recently  This pain is affected her quality of life and does not allow her to work, play with her grandchild, or do the activities she typically likes to do  She also complains of back and hip pain, for which she sees [facility] Rheumatology as well  She reports she has gained 100 lb since her knee pain started  She has tried to pursue bariatric surgery/weight management but has continued to run into insurance issues  She reports CSI of the knees typically give her some temporary relief  Her last injection for both knees was in on 10/14/2022 which did help  She did have viscosupplementation last in  which she feels did help for short period of time  Currently she has pain in both knees right worse than left    She ranks the pain as 6-8 "out of 10 most times  In the past after steroid injections and Visco she has gone down to 2 out of 10  Review of Systems  Pertinent items are noted in HPI  All other systems were reviewed and are negative  Physical Exam  /84   Pulse 90   Ht 5' 7\" (1 702 m)   Wt 136 kg (299 lb)   BMI 46 83 kg/m²   Cons: Appears well  No apparent distress  Psych: Alert  Oriented x3  Mood and affect normal   Eyes: PERRLA, EOMI  Resp: Normal effort  No audible wheezing or stridor  CV: Palpable pulse  No discernable arrhythmia  No LE edema  Lymph:  No palpable cervical, axillary, or inguinal lymphadenopathy  Skin: Warm  No palpable masses  No visible lesions  Neuro: Normal muscle tone  Normal and symmetric DTR's  Bilateral Knee Exam  Alignment:  Bilateral genu varus  Inspection:  Moderate swelling  Palpation:  Moderate effusion to right knee  ROM:  Knee Extension 15°  Knee Flexion 110°  Strength:  Not tested  Stability:  Not tested  Tests:  No pertinent positive or negative tests  Patella:  Patella tracks centrally with crepitus  Neurovascular:  Sensation intact in DP/SP/George/Sa/T nerve distributions  Palpable DP & PT pulses  Gait:  Antalgic  Studies Reviewed  I have personally reviewed pertinent films in PACS  XR of left knee - 2/26/21 images show tricompartmental OA with severe narrowing  XR of right knee - 5/4/18 images show mild OA with mild joint narrowing  Large joint arthrocentesis: L knee  Universal Protocol:  Consent: Verbal consent obtained    Consent given by: patient  Patient identity confirmed: verbally with patient    Supporting Documentation  Indications: pain and joint swelling   Procedure Details  Location: knee - L knee  Needle size: 18 G  Approach: lateral  Medications administered: 8 mL bupivacaine 0 25 %; 1 mL methylPREDNISolone acetate 40 mg/mL    Aspirate amount: 25 mL  Aspirate: clear, serous, blood-tinged and yellow    Patient tolerance: patient tolerated the " procedure well with no immediate complications  Dressing:  Sterile dressing applied    Large joint arthrocentesis: R knee  Universal Protocol:  Consent: Verbal consent obtained  Consent given by: patient  Patient identity confirmed: verbally with patient    Supporting Documentation  Indications: pain   Procedure Details  Location: knee - R knee  Needle size: 25 G  Approach: lateral  Medications administered: 4 mL bupivacaine 0 25 %; 1 mL methylPREDNISolone acetate 40 mg/mL    Patient tolerance: patient tolerated the procedure well with no immediate complications  Dressing:  Sterile dressing applied              Medical, Surgical, Family, and Social History  The patient's medical history, family history, and social history, were reviewed and updated as appropriate      Past Medical History:   Diagnosis Date   • Arthritis    • Chronic pain    • Fibromyalgia, primary    • Headache(784 0) Always   • Hiatal hernia    • High cholesterol    • History of transfusion     Years ago   • Hypertension        Past Surgical History:   Procedure Laterality Date   •  SECTION     • CHOLECYSTECTOMY     • FL GUIDED NEEDLE PLAC BX/ASP/INJ  10/27/2021   • HERNIA REPAIR     • KIDNEY SURGERY     • LIVER SURGERY         Family History   Problem Relation Age of Onset   • Cancer Mother    • Diabetes Father    • Heart disease Father    • Cancer Father        Social History     Occupational History   • Not on file   Tobacco Use   • Smoking status: Every Day     Packs/day: 0 10     Years: 0 00     Pack years: 0 00     Types: Cigarettes   • Smokeless tobacco: Never   • Tobacco comments:     in process of quitting 19   Vaping Use   • Vaping Use: Never used   Substance and Sexual Activity   • Alcohol use: No   • Drug use: No   • Sexual activity: Not on file       No Known Allergies      Current Outpatient Medications:   •  acetaminophen (TYLENOL) 325 mg tablet, Take 650 mg by mouth every 6 (six) hours as needed for mild pain, Disp: , Rfl:   •  albuterol (PROVENTIL HFA,VENTOLIN HFA) 90 mcg/act inhaler, Inhale 2 puffs as needed , Disp: , Rfl:   •  amLODIPine (NORVASC) 10 mg tablet, Take 10 mg by mouth daily, Disp: , Rfl:   •  atorvastatin (LIPITOR) 40 mg tablet, Take 40 mg by mouth daily , Disp: , Rfl:   •  Azelastine HCl 137 MCG/SPRAY SOLN, , Disp: , Rfl:   •  diclofenac (VOLTAREN) 75 mg EC tablet, Take 1 tablet (75 mg total) by mouth 2 (two) times a day, Disp: 60 tablet, Rfl: 6  •  DULoxetine (CYMBALTA) 30 mg delayed release capsule, TAKE 1 CAPSULE DAILY, Disp: 30 capsule, Rfl: 6  •  DULoxetine (CYMBALTA) 60 mg delayed release capsule, Take 1 PO QD , Disp: 30 capsule, Rfl: 6  •  ergocalciferol (VITAMIN D2) 50,000 units, Take 1 capsule (50,000 Units total) by mouth once a week, Disp: 12 capsule, Rfl: 2  •  fluticasone-umeclidinium-vilanterol (Trelegy Ellipta) 200-62 5-25 MCG/INH AEPB inhaler, Inhale 1 puff daily, Disp: , Rfl:   •  folic acid (FOLVITE) 1 mg tablet, Take 1 mg by mouth daily, Disp: , Rfl:   •  hydrocortisone 2 5 % ointment, Apply topically 2 (two) times a day, Disp: 30 g, Rfl: 3  •  Lancets (OneTouch Delica Plus [id number]) MISC, E11 65 (TYPE 2 DIABETES) TEST BLOOD SUGAR IN AM OR AS NEEDED DAILY  , Disp: , Rfl:   •  lisinopril-hydrochlorothiazide (PRINZIDE,ZESTORETIC) 20-25 MG per tablet, TAKE 1 TABLET BY MOUTH DAILY   PLEASE NOTE DOSE CHANGE, Disp: , Rfl:   •  metFORMIN (GLUCOPHAGE) 500 mg tablet, Take 500 mg by mouth 2 (two) times a day with meals, Disp: , Rfl:   •  montelukast (SINGULAIR) 10 mg tablet, TAKE 1 TABLET BY MOUTH EVERY DAY AT NIGHT, Disp: , Rfl:   •  ofloxacin (OCUFLOX) 0 3 % ophthalmic solution, Apply 1 drop to eye 4 (four) times a day, Disp: , Rfl:   •  pantoprazole (PROTONIX) 40 mg tablet, Take 40 mg by mouth 2 (two) times a day, Disp: , Rfl:   •  pregabalin (LYRICA) 100 mg capsule, Take 1 capsule (100 mg total) by mouth 3 (three) times a day, Disp: 90 capsule, Rfl: 6  •  Semaglutide,0 25 or 0 5MG/DOS, 2 MG/1 5ML SOPN, Inject 0 25 mg under the skin Once a week, Disp: , Rfl:   •  tiZANidine (ZANAFLEX) 2 mg tablet, Take 1 tab in the morning, 1 tab in the afternoon, and 2 tabs at bedtime, Disp: 360 tablet, Rfl: 6  •  varenicline 0 5 MG X 11 & 1 MG X 42 tablet therapy pack, USE AS DIRECTED   GIVE WITH MEALS AND WITH A FULL GLASS OF WATER , Disp: , Rfl:   •  budesonide-formoterol (SYMBICORT) 160-4 5 mcg/act inhaler, Inhale 2 puffs 2 (two) times a day, Disp: , Rfl:   •  Diclofenac Sodium (VOLTAREN) 1 %, Apply 2 g topically 4 (four) times a day (Patient not taking: Reported on 10/6/2022), Disp: 2 g, Rfl: 2  •  fluticasone-salmeterol (Advair) 250-50 mcg/dose inhaler, Inhale 1 puff 2 (two) times a day, Disp: , Rfl:   •  lidocaine (Lidoderm) 5 %, Apply 1 patch topically daily Remove & Discard patch within 12 hours or as directed by MD, Disp: 90 patch, Rfl: 0      Marisol Estevez PA-C    Scribe Attestation    I,:   am acting as a scribe while in the presence of the attending physician :       I,:   personally performed the services described in this documentation    as scribed in my presence :

## 2023-05-01 ENCOUNTER — TELEPHONE (OUTPATIENT)
Dept: PAIN MEDICINE | Facility: CLINIC | Age: 48
End: 2023-05-01

## 2023-05-01 NOTE — TELEPHONE ENCOUNTER
Couldn't LVM on phone  I want to let Pt know that they scheduled at the [facility] office today and I will be cancelling this appt  She is the [facility]  Please reschedule her at the [facility]      Thank You

## 2023-07-28 ENCOUNTER — PROCEDURE VISIT (OUTPATIENT)
Dept: OBGYN CLINIC | Facility: MEDICAL CENTER | Age: 48
End: 2023-07-28
Payer: COMMERCIAL

## 2023-07-28 VITALS
SYSTOLIC BLOOD PRESSURE: 139 MMHG | HEIGHT: 67 IN | BODY MASS INDEX: 45.99 KG/M2 | HEART RATE: 89 BPM | DIASTOLIC BLOOD PRESSURE: 84 MMHG | WEIGHT: 293 LBS

## 2023-07-28 DIAGNOSIS — M17.0 PRIMARY OSTEOARTHRITIS OF KNEES, BILATERAL: Primary | ICD-10-CM

## 2023-07-28 PROCEDURE — 20610 DRAIN/INJ JOINT/BURSA W/O US: CPT | Performed by: PHYSICIAN ASSISTANT

## 2023-07-28 RX ORDER — BUPIVACAINE HYDROCHLORIDE 5 MG/ML
3.5 INJECTION, SOLUTION PERINEURAL
Status: COMPLETED | OUTPATIENT
Start: 2023-07-28 | End: 2023-07-28

## 2023-07-28 RX ORDER — VARENICLINE TARTRATE 1 MG/1
TABLET, FILM COATED ORAL
COMMUNITY
Start: 2023-06-16

## 2023-07-28 RX ORDER — METFORMIN HYDROCHLORIDE 500 MG/1
500 TABLET, EXTENDED RELEASE ORAL
COMMUNITY
Start: 2023-05-25

## 2023-07-28 RX ADMIN — BUPIVACAINE HYDROCHLORIDE 3.5 ML: 5 INJECTION, SOLUTION PERINEURAL at 10:30

## 2023-07-28 NOTE — PROGRESS NOTES
Orthopaedic Surgery - Office Note  Briana Boss (44 y.o. female)   : 1975   MRN: [id number]  Encounter Date: 2023    Chief Complaint   Patient presents with   • Left Knee - Follow-up   • Right Knee - Follow-up     Assessment / Plan  Bilateral knee osteoarthritis     · The patient does continue to pursue weight loss. Her insurance continues to deny any attempts to pursue weight loss surgery. We do continue with the recommendation to lose weight, which will help her knee pain as well as allow her to quality for TKA if/when she desires to move forward to surgical treatment of her bilateral knee pain. · Durolane injections were performed for both knees were after discussion of risks and benefits. Both tolerated well. · Activity as tolerated  · ROM without restrictions  · Anti-inflammatories or Tylenol prn pain  Return in about 3 months (around 10/28/2023) for follow up with [facility]. History of Present Illness  Briana Boss is a 50 y.o. female who presents for follow up evaluation of her bilateral knee osteoarthritis. She complains of severe pain bilaterally, worse in the right recently. This pain is affected her quality of life and does not allow her to work, play with her grandchild, or do the activities she typically likes to do. She also complains of back and hip pain, for which she sees Sp[facility] Pain [facility] Rheumatology as well. She reports she has gained 100 lb since her knee pain started. She has tried to pursue bariatric surgery/weight management but has continued to run into insurance issues. She reports CSI of the knees typically give her some temporary relief. Her last injection for both knees was in on 2023 which did help. She did have viscosupplementation last in  which she feels did help for short period of time. Currently she has pain in both knees right worse than left. She ranks the pain as 6-8 out of 10 most times.   In the past after steroid injections and Visco she has gone down to 2 out of 10. She would like to proceed with viscosupplementation in both knees at today's visit. Review of Systems  Pertinent items are noted in HPI. All other systems were reviewed and are negative. Physical Exam  /84   Pulse 89   Ht 5' 7" (1.702 m)   Wt (!) 137 kg (303 lb)   BMI 47.46 kg/m²   Cons: Appears well. No apparent distress. Psych: Alert. Oriented x3. Mood and affect normal.  Eyes: PERRLA, EOMI  Resp: Normal effort. No audible wheezing or stridor. CV: Palpable pulse. No discernable arrhythmia. No LE edema. Lymph:  No palpable cervical, axillary, or inguinal lymphadenopathy. Skin: Warm. No palpable masses. No visible lesions. Neuro: Normal muscle tone. Normal and symmetric DTR's. Bilateral Knee Exam  Alignment:  Bilateral genu varus. Inspection:  Moderate swelling. Palpation:  Moderate effusion to right knee. ROM:  Knee Extension 15°. Knee Flexion 110°. Strength:  Not tested. Stability:  Not tested. Tests:  No pertinent positive or negative tests. Patella:  Patella tracks centrally with crepitus. Neurovascular:  Sensation intact in DP/SP/George/Sa/T nerve distributions. Palpable DP & PT pulses. Gait:  Antalgic. Studies Reviewed  I have personally reviewed pertinent films in PACS. XR of left knee - 2/26/21 images show tricompartmental OA with severe narrowing. XR of right knee - 5/4/18 images show mild OA with mild joint narrowing. Large joint arthrocentesis: bilateral knee  Universal Protocol:  Consent: Verbal consent obtained.   Consent given by: patient  Patient identity confirmed: verbally with patient    Supporting Documentation  Indications: pain   Procedure Details  Location: knee - bilateral knee  Needle size: 22 G  Approach: lateral    Medications (Right): 3.5 mL bupivacaine 0.5 %; 3 mL sodium hyaluronate 60 MG/3MLMedications (Left): 3.5 mL bupivacaine 0.5 %; 3 mL sodium hyaluronate 60 MG/3ML   Patient tolerance: patient tolerated the procedure well with no immediate complications  Dressing:  Sterile dressing applied              Medical, Surgical, Family, and Social History  The patient's medical history, family history, and social history, were reviewed and updated as appropriate.     Past Medical History:   Diagnosis Date   • Arthritis    • Chronic pain    • Fibromyalgia, primary    • Headache(784.0) Always   • Hiatal hernia    • High cholesterol    • History of transfusion     Years ago   • Hypertension        Past Surgical History:   Procedure Laterality Date   •  SECTION     • CHOLECYSTECTOMY     • FL GUIDED NEEDLE PLAC BX/ASP/INJ  10/27/2021   • HERNIA REPAIR     • KIDNEY SURGERY     • LIVER SURGERY         Family History   Problem Relation Age of Onset   • Cancer Mother    • Diabetes Father    • Heart disease Father    • Cancer Father        Social History     Occupational History   • Not on file   Tobacco Use   • Smoking status: Every Day     Packs/day: 0.10     Years: 0.00     Total pack years: 0.00     Types: Cigarettes   • Smokeless tobacco: Never   • Tobacco comments:     in process of quitting 19   Vaping Use   • Vaping Use: Never used   Substance and Sexual Activity   • Alcohol use: No   • Drug use: No   • Sexual activity: Not on file       No Known Allergies      Current Outpatient Medications:   •  acetaminophen (TYLENOL) 325 mg tablet, Take 650 mg by mouth every 6 (six) hours as needed for mild pain, Disp: , Rfl:   •  albuterol (PROVENTIL HFA,VENTOLIN HFA) 90 mcg/act inhaler, Inhale 2 puffs as needed , Disp: , Rfl:   •  amLODIPine (NORVASC) 10 mg tablet, Take 10 mg by mouth daily, Disp: , Rfl:   •  atorvastatin (LIPITOR) 40 mg tablet, Take 40 mg by mouth daily , Disp: , Rfl:   •  Azelastine HCl 137 MCG/SPRAY SOLN, , Disp: , Rfl:   •  diclofenac (VOLTAREN) 75 mg EC tablet, Take 1 tablet (75 mg total) by mouth 2 (two) times a day, Disp: 60 tablet, Rfl: 6  •  Diclofenac Sodium (VOLTAREN) 1 %, Apply 2 g topically 4 (four) times a day, Disp: 2 g, Rfl: 2  •  DULoxetine (CYMBALTA) 30 mg delayed release capsule, TAKE 1 CAPSULE DAILY, Disp: 30 capsule, Rfl: 6  •  DULoxetine (CYMBALTA) 60 mg delayed release capsule, Take 1 PO QD., Disp: 30 capsule, Rfl: 6  •  ergocalciferol (VITAMIN D2) 50,000 units, Take 1 capsule (50,000 Units total) by mouth once a week, Disp: 12 capsule, Rfl: 2  •  fluticasone-umeclidinium-vilanterol (Trelegy Ellipta) 200-62.5-25 MCG/INH AEPB inhaler, Inhale 1 puff daily, Disp: , Rfl:   •  folic acid (FOLVITE) 1 mg tablet, Take 1 mg by mouth daily, Disp: , Rfl:   •  hydrocortisone 2.5 % cream, Apply 1 application. topically 2 (two) times a day To affected area, Disp: , Rfl:   •  hydrocortisone 2.5 % ointment, Apply topically 2 (two) times a day, Disp: 30 g, Rfl: 3  •  Lancets (OneTouch Delica Plus [id number]) MISC, E11.65 (TYPE 2 DIABETES) TEST BLOOD SUGAR IN AM OR AS NEEDED DAILY. , Disp: , Rfl:   •  lisinopril-hydrochlorothiazide (PRINZIDE,ZESTORETIC) 20-25 MG per tablet, TAKE 1 TABLET BY MOUTH DAILY.  PLEASE NOTE DOSE CHANGE, Disp: , Rfl:   •  metFORMIN (GLUCOPHAGE) 500 mg tablet, Take 500 mg by mouth 2 (two) times a day with meals, Disp: , Rfl:   •  metFORMIN (GLUCOPHAGE-XR) 500 mg 24 hr tablet, Take 500 mg by mouth daily with breakfast, Disp: , Rfl:   •  montelukast (SINGULAIR) 10 mg tablet, TAKE 1 TABLET BY MOUTH EVERY DAY AT NIGHT, Disp: , Rfl:   •  ofloxacin (OCUFLOX) 0.3 % ophthalmic solution, Apply 1 drop to eye 4 (four) times a day, Disp: , Rfl:   •  pantoprazole (PROTONIX) 40 mg tablet, Take 40 mg by mouth 2 (two) times a day, Disp: , Rfl:   •  pregabalin (LYRICA) 100 mg capsule, Take 1 capsule (100 mg total) by mouth 3 (three) times a day, Disp: 90 capsule, Rfl: 6  •  Semaglutide,0.25 or 0.5MG/DOS, 2 MG/1.5ML SOPN, Inject 0.25 mg under the skin Once a week, Disp: , Rfl:   •  tiZANidine (ZANAFLEX) 2 mg tablet, Take 1 tab in the morning, 1 tab in the afternoon, and 2 tabs at bedtime, Disp: 360 tablet, Rfl: 6  •  varenicline (CHANTIX) 1 mg tablet, TAKE 1 TABLET BY MOUTH TWICE A DAY WITH A FULL GLASS OF WATER, Disp: , Rfl:   •  varenicline 0.5 MG X 11 & 1 MG X 42 tablet therapy pack, USE AS DIRECTED.  GIVE WITH MEALS AND WITH A FULL GLASS OF WATER., Disp: , Rfl:   •  budesonide-formoterol (SYMBICORT) 160-4.5 mcg/act inhaler, Inhale 2 puffs 2 (two) times a day, Disp: , Rfl:   •  fluticasone-salmeterol (Advair) 250-50 mcg/dose inhaler, Inhale 1 puff 2 (two) times a day, Disp: , Rfl:   •  lidocaine (Lidoderm) 5 %, Apply 1 patch topically daily Remove & Discard patch within 12 hours or as directed by MD, Disp: 90 patch, Rfl: 0      Daniela Carvalho PA-C    Scribe Attestation    I,:   am acting as a scribe while in the presence of the attending physician.:       I,:   personally performed the services described in this documentation    as scribed in my presence.:

## 2023-09-21 ENCOUNTER — APPOINTMENT (OUTPATIENT)
Dept: RADIOLOGY | Facility: MEDICAL CENTER | Age: 48
End: 2023-09-21
Payer: COMMERCIAL

## 2023-09-21 ENCOUNTER — OFFICE VISIT (OUTPATIENT)
Dept: PAIN MEDICINE | Facility: MEDICAL CENTER | Age: 48
End: 2023-09-21
Payer: COMMERCIAL

## 2023-09-21 VITALS
BODY MASS INDEX: 45.99 KG/M2 | HEART RATE: 102 BPM | OXYGEN SATURATION: 99 % | SYSTOLIC BLOOD PRESSURE: 150 MMHG | HEIGHT: 67 IN | WEIGHT: 293 LBS | DIASTOLIC BLOOD PRESSURE: 102 MMHG

## 2023-09-21 DIAGNOSIS — M25.551 BILATERAL HIP PAIN: Primary | ICD-10-CM

## 2023-09-21 DIAGNOSIS — M47.816 LUMBAR SPONDYLOSIS: ICD-10-CM

## 2023-09-21 DIAGNOSIS — M54.16 LUMBAR RADICULOPATHY: ICD-10-CM

## 2023-09-21 DIAGNOSIS — M25.552 BILATERAL HIP PAIN: Primary | ICD-10-CM

## 2023-09-21 DIAGNOSIS — M48.062 SPINAL STENOSIS OF LUMBAR REGION WITH NEUROGENIC CLAUDICATION: ICD-10-CM

## 2023-09-21 DIAGNOSIS — M25.551 BILATERAL HIP PAIN: ICD-10-CM

## 2023-09-21 DIAGNOSIS — M25.552 BILATERAL HIP PAIN: ICD-10-CM

## 2023-09-21 DIAGNOSIS — G89.4 CHRONIC PAIN SYNDROME: ICD-10-CM

## 2023-09-21 PROCEDURE — 99214 OFFICE O/P EST MOD 30 MIN: CPT | Performed by: PHYSICIAN ASSISTANT

## 2023-09-21 PROCEDURE — 73521 X-RAY EXAM HIPS BI 2 VIEWS: CPT

## 2023-09-21 RX ORDER — POLYETHYLENE GLYCOL 3350
17 POWDER (GRAM) MISCELLANEOUS DAILY
COMMUNITY
Start: 2023-08-25

## 2023-09-21 RX ORDER — MELOXICAM 15 MG/1
15 TABLET ORAL DAILY
Qty: 30 TABLET | Refills: 2 | Status: SHIPPED | OUTPATIENT
Start: 2023-09-21

## 2023-09-21 RX ORDER — AMMONIUM LACTATE 12 G/100G
LOTION TOPICAL
COMMUNITY
Start: 2023-08-25 | End: 2024-08-24

## 2023-09-21 NOTE — PROGRESS NOTES
Assessment:  1. Bilateral hip pain    2. Lumbar spondylosis    3. Lumbar radiculopathy    4. Spinal stenosis of lumbar region with neurogenic claudication    5. Chronic pain syndrome        Plan:  While the patient was in the office today, I did have a thorough conversation regarding their chronic pain syndrome, medication management, and treatment plan options. After discussing options, I have recommended that we proceed with x-rays of the bilateral hips given her significant hip and groin pain. Unfortunately she failed to respond to the L5-S1 interlaminar epidural steroid injection done in January 2023. I did discuss the possibility of a bilateral transforaminal approach. We will first obtain the x-rays of the hips and then decide which direction is the next best step to go in. She would likely not be a candidate for any type of lumbar surgery given her morbid obesity as well as diabetes and smoking. Discontinue diclofenac due to no clinical effect and proceed with a trial of meloxicam 15 mg daily. Continue to follow-up with Ortho regarding her chronic knee pain    My impressions and treatment recommendations were discussed in detail with the patient who verbalized understanding and had no further questions. Discharge instructions were provided. I personally saw and examined the patient and I agree with the above discussed plan of care. Orders Placed This Encounter   Procedures   • XR hips bilateral 2 vw w pelvis if performed     Standing Status:   Future     Number of Occurrences:   1     Standing Expiration Date:   9/21/2027     Scheduling Instructions:      Bring along any outside films relating to this procedure. Order Specific Question:   Is the patient pregnant?      Answer:   No     New Medications Ordered This Visit   Medications   • ammonium lactate (LAC-HYDRIN) 12 % lotion     Sig: Apply topically   • Polyethylene Glycol 3350 POWD     Sig: Take 17 g by mouth daily   • meloxicam (Mobic) 15 mg tablet     Sig: Take 1 tablet (15 mg total) by mouth daily     Dispense:  30 tablet     Refill:  2       History of Present Illness:  Leigh Palmer is a 46 y.o. female who presents for a follow up office visit in regards to chronic pain. The patient’s current symptoms include chronic radicular low back pain and hip pain that she presently rates a 10 out of 10 on the scale describes it as a constant sharp, throbbing, shooting type of pain. The pain across the low back is bilateral she reports radiation down the anterior and lateral aspects of bilateral lower extremities. She has intermittent numbness and tingling in the legs as well as significant weakness. She reports bilateral hip pain with referred pain patterns into the groin areas. Her mobility is significantly limited and she is utilizing the cane on a regular basis at this point and finds herself leaning forward for relief. Patient denies saddle anesthesia and denies bowel incontinence. She does admit to urinary stress incontinence. January she underwent a an L5-S1 epidural steroid injection with no improvement. The patient states that she has not followed up with us since then due to both mobility and depression regarding her chronic multisite pain state. She follows up with rheumatology but reports minimal relief with the medications that they have her on. I have personally reviewed and/or updated the patient's past medical history, past surgical history, family history, social history, current medications, allergies, and vital signs today. Review of Systems   Respiratory: Positive for shortness of breath. Cardiovascular: Negative for chest pain. Gastrointestinal: Positive for constipation and nausea. Negative for diarrhea and vomiting. Musculoskeletal: Positive for back pain, gait problem, joint swelling, myalgias and neck pain. Negative for arthralgias. Skin: Negative for rash.    Neurological: Positive for dizziness and weakness. Negative for seizures. Psychiatric/Behavioral: Positive for decreased concentration. All other systems reviewed and are negative.       Patient Active Problem List   Diagnosis   • Tear of medial meniscus of left knee, current   • Primary osteoarthritis of left knee   • Wheezing   • Class 3 severe obesity with body mass index (BMI) of 40.0 to 44.9 in adult [facility])   • Tobacco use   • Chronic bilateral low back pain without sciatica   • Lumbar spondylosis   • Primary osteoarthritis of knees, bilateral   • Pain of both wrist joints   • Bilateral carpal tunnel syndrome   • Chronic pain of both knees   • Diffuse pain   • Chronic pain syndrome   • Myofascial pain syndrome   • Fibromyalgia, primary   • Type 2 diabetes mellitus without complication, without long-term current use of insulin (HCC)   • Lumbar radiculitis       Past Medical History:   Diagnosis Date   • Arthritis    • Asthma    • Chronic pain    • Diabetes mellitus (HCC)    • Fibromyalgia, primary    • Headache(784.0) Always   • Hiatal hernia    • High cholesterol    • History of transfusion     Years ago   • Hypertension        Past Surgical History:   Procedure Laterality Date   •  SECTION     • CHOLECYSTECTOMY     • FL GUIDED NEEDLE PLAC BX/ASP/INJ  10/27/2021   • HERNIA REPAIR     • KIDNEY SURGERY     • LIVER SURGERY         Family History   Problem Relation Age of Onset   • Cancer Mother    • Arthritis Mother    • Osteoporosis Mother    • Diabetes Father    • Heart disease Father    • Cancer Father        Social History     Occupational History   • Not on file   Tobacco Use   • Smoking status: Light Smoker     Packs/day: 0.50     Years: 25.00     Total pack years: 12.50     Types: Cigarettes   • Smokeless tobacco: Never   • Tobacco comments:     in process of quitting 19   Vaping Use   • Vaping Use: Never used   Substance and Sexual Activity   • Alcohol use: No   • Drug use: No   • Sexual activity: Not on file Current Outpatient Medications on File Prior to Visit   Medication Sig   • acetaminophen (TYLENOL) 325 mg tablet Take 650 mg by mouth every 6 (six) hours as needed for mild pain   • albuterol (PROVENTIL HFA,VENTOLIN HFA) 90 mcg/act inhaler Inhale 2 puffs as needed    • amLODIPine (NORVASC) 10 mg tablet Take 10 mg by mouth daily   • ammonium lactate (LAC-HYDRIN) 12 % lotion Apply topically   • atorvastatin (LIPITOR) 40 mg tablet Take 40 mg by mouth daily    • Azelastine HCl 137 MCG/SPRAY SOLN    • budesonide-formoterol (SYMBICORT) 160-4.5 mcg/act inhaler Inhale 2 puffs 2 (two) times a day   • DULoxetine (CYMBALTA) 30 mg delayed release capsule TAKE 1 CAPSULE DAILY   • DULoxetine (CYMBALTA) 60 mg delayed release capsule Take 1 PO QD. • ergocalciferol (VITAMIN D2) 50,000 units Take 1 capsule (50,000 Units total) by mouth once a week   • fluticasone-umeclidinium-vilanterol (Trelegy Ellipta) 200-62.5-25 MCG/INH AEPB inhaler Inhale 1 puff daily   • folic acid (FOLVITE) 1 mg tablet Take 1 mg by mouth daily   • hydrocortisone 2.5 % cream Apply 1 application. topically 2 (two) times a day To affected area   • lidocaine (Lidoderm) 5 % Apply 1 patch topically daily Remove & Discard patch within 12 hours or as directed by MD   • lisinopril-hydrochlorothiazide (PRINZIDE,ZESTORETIC) 20-25 MG per tablet TAKE 1 TABLET BY MOUTH DAILY.  PLEASE NOTE DOSE CHANGE   • metFORMIN (GLUCOPHAGE) 500 mg tablet Take 500 mg by mouth 2 (two) times a day with meals   • metFORMIN (GLUCOPHAGE-XR) 500 mg 24 hr tablet Take 500 mg by mouth daily with breakfast   • montelukast (SINGULAIR) 10 mg tablet TAKE 1 TABLET BY MOUTH EVERY DAY AT NIGHT   • ofloxacin (OCUFLOX) 0.3 % ophthalmic solution Apply 1 drop to eye 4 (four) times a day   • pantoprazole (PROTONIX) 40 mg tablet Take 40 mg by mouth 2 (two) times a day   • Polyethylene Glycol 3350 POWD Take 17 g by mouth daily   • pregabalin (LYRICA) 100 mg capsule Take 1 capsule (100 mg total) by mouth 3 (three) times a day   • Semaglutide,0.25 or 0.5MG/DOS, 2 MG/1.5ML SOPN Inject 0.25 mg under the skin Once a week   • tiZANidine (ZANAFLEX) 2 mg tablet Take 1 tab in the morning, 1 tab in the afternoon, and 2 tabs at bedtime   • varenicline (CHANTIX) 1 mg tablet TAKE 1 TABLET BY MOUTH TWICE A DAY WITH A FULL GLASS OF WATER   • varenicline 0.5 MG X 11 & 1 MG X 42 tablet therapy pack USE AS DIRECTED. GIVE WITH MEALS AND WITH A FULL GLASS OF WATER. • [DISCONTINUED] diclofenac (VOLTAREN) 75 mg EC tablet Take 1 tablet (75 mg total) by mouth 2 (two) times a day   • Diclofenac Sodium (VOLTAREN) 1 % Apply 2 g topically 4 (four) times a day   • fluticasone-salmeterol (Advair) 250-50 mcg/dose inhaler Inhale 1 puff 2 (two) times a day   • hydrocortisone 2.5 % ointment Apply topically 2 (two) times a day   • Lancets (OneTouch Delica Plus HJCRTY38I) MISC E11.65 (TYPE 2 DIABETES) TEST BLOOD SUGAR IN AM OR AS NEEDED DAILY. No current facility-administered medications on file prior to visit. No Known Allergies    Physical Exam:    BP (!) 150/102   Pulse 102   Ht 5' 7" (1.702 m)   Wt 136 kg (300 lb)   SpO2 99%   BMI 46.99 kg/m²     Constitutional:normal, well developed, well nourished, alert, in no distress and non-toxic and no overt pain behavior. and obese  Eyes:anicteric  HEENT:grossly intact  Neck:supple, symmetric, trachea midline and no masses   Pulmonary:even and unlabored  Cardiovascular:No edema or pitting edema present  Skin:Normal without rashes or lesions and well hydrated  Psychiatric:Mood and affect appropriate  Neurologic:Cranial Nerves II-XII grossly intact  Musculoskeletal: Ambulates with cane, forward leaning, slow but stable. Tender bilateral sacroiliac joints, tender paraspinal muscles of the lumbar spine, under lumbar facet joints bilaterally, painful range of motion of bilateral hip joints.     Imaging

## 2023-09-28 ENCOUNTER — TELEPHONE (OUTPATIENT)
Dept: PAIN MEDICINE | Facility: CLINIC | Age: 48
End: 2023-09-28

## 2023-09-28 NOTE — TELEPHONE ENCOUNTER
----- Message from Feliz Arroyo PA-C sent at 9/28/2023  8:20 AM EDT -----  Please let patient know her hip xrays reveal bony protuberances bilaterally. She can consult with ortho for this if she would like;  she sees ortho already for her knees.

## 2023-10-28 DIAGNOSIS — G89.29 CHRONIC PAIN OF BOTH KNEES: ICD-10-CM

## 2023-10-28 DIAGNOSIS — R52 DIFFUSE PAIN: ICD-10-CM

## 2023-10-28 DIAGNOSIS — G89.4 CHRONIC PAIN SYNDROME: ICD-10-CM

## 2023-10-28 DIAGNOSIS — M79.18 MYOFASCIAL PAIN SYNDROME: ICD-10-CM

## 2023-10-28 DIAGNOSIS — M17.0 PRIMARY OSTEOARTHRITIS OF KNEES, BILATERAL: ICD-10-CM

## 2023-10-28 DIAGNOSIS — M47.816 LUMBAR SPONDYLOSIS: ICD-10-CM

## 2023-10-28 DIAGNOSIS — M54.50 CHRONIC BILATERAL LOW BACK PAIN WITHOUT SCIATICA: ICD-10-CM

## 2023-10-28 DIAGNOSIS — M25.561 CHRONIC PAIN OF BOTH KNEES: ICD-10-CM

## 2023-10-28 DIAGNOSIS — M25.562 CHRONIC PAIN OF BOTH KNEES: ICD-10-CM

## 2023-10-28 DIAGNOSIS — G89.29 CHRONIC BILATERAL LOW BACK PAIN WITHOUT SCIATICA: ICD-10-CM

## 2023-10-31 RX ORDER — TIZANIDINE 2 MG/1
TABLET ORAL
Qty: 360 TABLET | Refills: 5 | Status: SHIPPED | OUTPATIENT
Start: 2023-10-31

## 2023-11-08 NOTE — PATIENT INSTRUCTIONS
Chief complaint:   Chief Complaint   Patient presents with   • Ear Pain     Room 1- pt brought in by mom for right ear pain. Pt reported that pain started yesterday. Pt given Ibuprofen and tylenol last night.        Vitals:  Visit Vitals  BP 96/66   Pulse 96   Temp 97.3 °F (36.3 °C) (Tympanic)   Resp 20   Ht 4' 1\" (1.245 m)   Wt 25.4 kg (55 lb 14.4 oz)   SpO2 99%   BMI 16.37 kg/m²       HISTORY OF PRESENT ILLNESS     Epic chart reviewed for previous medical records.  Case discussed with primary RN responsible for patient.    Nanette Alonso is a 5 year old female who [place] [facility] [place] complaint of right ear pain that started yesterday. Described as sharp, moderate, constant, nonradiating. No fever or drainage. No headache or jaw pain. Occasional cough over last week but no congestion or rhinorrhea. Denied n/v/d/c, abdominal pain, CP, dyspnea, neuro deficits. PO tolerant. Eating and drinking normally.Has been taking Tylenol with good relief. Has been active at home. Making normal amount of urine. Up to date on vaccinations.       ALLERGIES    ALLERGIES:  No Known Allergies    CURRENT MEDICATIONS    Current Outpatient Medications:  Pediatric Multiple Vitamins (CHILDRENS MULTIVITAMIN PO), , Disp: , Rfl:   ibuprofen (CHILDRENS ADVIL) 100 MG/5ML suspension, Take 10.5 mLs by mouth every 8 hours as needed for Pain or Fever., Disp: 300 mL, Rfl: 1  acetaminophen (Tylenol Childrens) 160 MG/5ML suspension, Take 9.8 mLs by mouth every 6 hours as needed for Fever., Disp: 300 mL, Rfl: 1  melatonin 5 MG, Take 5 mg by mouth at bedtime as needed., Disp: , Rfl:   CETIRIZINE HCL ALLERGY CHILD PO, Take by mouth daily., Disp: , Rfl:     No current facility-administered medications for this visit.      PAST MEDICAL HISTORY    Past Medical History:  12/28/2022: Adenotonsillar hypertrophy  No date: Allergy  12/28/2022: Needle phobia      Comment:  \"very afraid .....blood draw was traumatic for lab for                Pain management to consider lumbar epidural steroid injection for significant lumbar radiculopathy  Water therapy referral made  Continue duloxetine 90mg daily  Continue Lyrica 100mg three times a day  Continue diclofenac twice a day as needed for joint pain  Take tizanidine 2mg in morning and afternoon, and 4mg at bedtime  Do knee x-rays  Wear wrist splints at bedtime    Return to clinic in 7 months  surgery \" per mother  No date: Otitis media    SURGICAL HISTORY    Past Surgical History:  01/05/2023: Remove tonsils/adenoids,<11 y/o; Bilateral    SOCIAL HISTORY    Social History    Tobacco Use      Smoking status: Never      Smokeless tobacco: Never            Other significant problems:  There are no problems to display for this patient.      PAST MEDICAL, FAMILY AND SOCIAL HISTORY     Medications:  Current Outpatient Medications   Medication Sig Dispense Refill   • amoxicillin (AMOXIL) 400 MG/5ML suspension Take 12 mL PO Q12 hours X 5 days. 120 mL 0   • Pediatric Multiple Vitamins (CHILDRENS MULTIVITAMIN PO)      • ibuprofen (CHILDRENS ADVIL) 100 MG/5ML suspension Take 10.5 mLs by mouth every 8 hours as needed for Pain or Fever. 300 mL 1   • acetaminophen (Tylenol Childrens) 160 MG/5ML suspension Take 9.8 mLs by mouth every 6 hours as needed for Fever. 300 mL 1   • melatonin 5 MG Take 5 mg by mouth at bedtime as needed.     • CETIRIZINE HCL ALLERGY CHILD PO Take by mouth daily.       No current facility-administered medications for this visit.       Allergies:  ALLERGIES:  No Known Allergies    Past Medical  History/Surgeries:  Past Medical History:   Diagnosis Date   • Adenotonsillar hypertrophy 12/28/2022   • Allergy    • Needle phobia 12/28/2022    \"very afraid .....blood draw was traumatic for lab for surgery \" per mother   • Otitis media        Past Surgical History:   Procedure Laterality Date   • Remove tonsils/adenoids,<11 y/o Bilateral 01/05/2023       Family History:  Family History   Problem Relation Age of Onset   • ENT Disorder Sister 3        adenotonsillar hypertrophy       Social History:  Social History     Tobacco Use   • Smoking status: Never   • Smokeless tobacco: Never   Substance Use Topics   • Alcohol use: Not on file       REVIEW OF SYSTEMS     Review of Systems   Constitutional: Negative for activity change, appetite change, fever and unexpected weight change.   HENT: Positive for ear  pain. Negative for congestion, ear discharge, mouth sores, rhinorrhea and sore throat.    Eyes: Negative for discharge and redness.   Respiratory: Positive for cough. Negative for shortness of breath.    Cardiovascular: Negative for chest pain.   Gastrointestinal: Negative for abdominal pain, constipation, diarrhea, nausea and vomiting.   Genitourinary: Negative for decreased urine volume, dysuria, frequency and urgency.   Musculoskeletal: Negative for myalgias and neck pain.   Skin: Negative for rash.   Allergic/Immunologic: Negative for immunocompromised state.   Neurological: Negative for weakness and headaches.   All other systems reviewed and are negative.      PHYSICAL EXAM     Physical Exam  Vitals and nursing note reviewed.   Constitutional:       General: She is active. She is not in acute distress.     Appearance: Normal appearance. She is well-developed. She is not toxic-appearing.   HENT:      Head: Normocephalic and atraumatic.      Right Ear: Ear canal and external ear normal. No decreased hearing noted. No pain on movement. No laceration, drainage, swelling or tenderness. A middle ear effusion is present. Ear canal is not visually occluded. There is no impacted cerumen. No foreign body. No mastoid tenderness. No PE tube. No hemotympanum. Tympanic membrane is erythematous and bulging. Tympanic membrane is not injected, scarred, perforated or retracted.      Left Ear: Tympanic membrane, ear canal and external ear normal. No decreased hearing noted. No pain on movement. No laceration, drainage, swelling or tenderness.  No middle ear effusion. Ear canal is not visually occluded. There is no impacted cerumen. No foreign body. No mastoid tenderness. No PE tube. No hemotympanum. Tympanic membrane is not injected, scarred, perforated, erythematous, retracted or bulging.      Ears:      Comments: Minimal bulging with obvious erythema and small effusion.     Nose: Nose normal.      Mouth/Throat:      Lips:  Pink. No lesions.      Mouth: Mucous membranes are moist. No oral lesions.      Dentition: No gum lesions.      Tongue: No lesions. Tongue does not deviate from midline.      Palate: No mass and lesions.      Pharynx: Oropharynx is clear. Uvula midline. No pharyngeal swelling, oropharyngeal exudate, posterior oropharyngeal erythema, pharyngeal petechiae, cleft palate or uvula swelling.      Tonsils: No tonsillar exudate or tonsillar abscesses.      Neck: Normal range of motion and neck supple. No rigidity or tenderness.   Eyes:      General:         Right eye: No discharge.         Left eye: No discharge.      Extraocular Movements: Extraocular movements intact.      Conjunctiva/sclera: Conjunctivae normal.      Pupils: Pupils are equal, round, and reactive to light.   Cardiovascular:      Rate and Rhythm: Normal rate and regular rhythm.      Pulses: Normal pulses.      Heart sounds: Normal heart sounds.   Pulmonary:      Effort: Pulmonary effort is normal. No respiratory distress, nasal flaring or retractions.      Breath sounds: Normal breath sounds. No stridor or decreased air movement. No wheezing, rhonchi or rales.   Abdominal:      General: Abdomen is flat. There is no distension.      Palpations: Abdomen is soft.      Tenderness: There is no abdominal tenderness. There is no guarding.   Musculoskeletal:         General: Normal range of motion.   Lymphadenopathy:      Cervical: No cervical adenopathy.   Skin:     General: Skin is warm and dry.      Capillary Refill: Capillary refill takes less than 2 seconds.   Neurological:      General: No focal deficit present.      Mental Status: She is alert and oriented for age.   Psychiatric:         Behavior: Behavior normal.         ASSESSMENT/PLAN       Nanette was seen today for ear pain.    Diagnoses and all orders for this visit:    Right non-suppurative otitis media    Other orders  -     amoxicillin (AMOXIL) 400 MG/5ML suspension; Take 12 mL PO Q12 hours X 5  days.      Assessment  - Well appearing. Well Hydrated. NAD. Smiling, comfortable, and active in exam room. Afebrile. VS stable  - Exam notes erythema and effusion to right ear.   - Based on history and exam, feel this is most consistent with OM  - Differential diagnosis is broad and includes multiple high risk conditions that were considered, including but not limited to OE, mastoiditis, cellulitis, foreign body      Plan  - After evaluation, feel patient is safe for outpatient management.   - Treatment includes Amoxicillin  - Symptomatic care (see AVS)  - Will discharge with PCM follow up.   - Discussed red flags for immediate ER evaluation as well as return precautions at length.   - Patient's parent appeared to understand and agree with plan.   - AVS reviewed prior to discharge.         Visit Vitals  BP 96/66   Pulse 96   Temp 97.3 °F (36.3 °C) (Tympanic)   Resp 20   Ht 4' 1\" (1.245 m)   Wt 25.4 kg (55 lb 14.4 oz)   SpO2 99%   BMI 16.37 kg/m²       Labs   No results found for this visit on 11/08/23.    Imaging    Final recheck on patient at discharge was reassuring and patient was appropriately stable at time of discharge from urgent care clinic. All questions answered and patient (parent if applicable) appeared to understand and agree with treatment and discharge plan, including return precautions and follow up plan.     Risks and benefits of treatment were discussed at length. Cautioned and advised about the possibility of worsening symptoms and red flags. Patient (patient if applicable) was advised to go to ER immediately if this occurs. Otherwise follow up with their primary physician or return to Urgent Care if unable to go to the primary physician.    The provisional diagnosis that the patient is discharged with today was based on the history taken, presenting symptoms, physical exam, and/or any ancillary testing. Patient (parent if applicable) informed that often times the diagnosis can change.     See  the After Visit Summary for additional instructions, follow-up plans and/or emergency room precautions discussed with the patient.    Universal precautions maintained and the following PPE was worn by provider during all interactions and exam with this patient:  [] Faceshield + level 2 or 3 procedural mask  [ ] Gloves  [x ] Gloves, gown, faceshield, N95 mask    Medical notes like these are available to patients in the interest of transparency. However, be advised this is a medical document. It is intended as peer to peer communication. It is written in medical language and may contain abbreviations or verbiage that are unfamiliar. It may appear blunt or direct. Medical documents are intended to carry relevant information, facts as evident, and the clinical opinion of the practitioner.    Please note that all or part of this note was dictated using voice recognition software. As such, there may be uncorrected typographical errors. Please consider this while reading this note.

## 2024-04-19 ENCOUNTER — TELEPHONE (OUTPATIENT)
Age: 49
End: 2024-04-19

## 2024-04-19 NOTE — TELEPHONE ENCOUNTER
Caller: patient     Doctor: Froylan     Reason for call: l knee is swollen to the size of a watermelon for the past few days,  declines going to ED,  said she has tried ice and creams to get the swelling down but nothing is working,  having a very hard time walking. Appt scheduled, declined sooner date in [place]    Call back#: [phone]

## 2024-04-19 NOTE — TELEPHONE ENCOUNTER
See previous pt msg.  Attempted to call pt at number provided and message said the person you are trying to call is not available. Try again later.

## 2024-04-22 ENCOUNTER — OFFICE VISIT (OUTPATIENT)
Dept: PAIN MEDICINE | Facility: MEDICAL CENTER | Age: 49
End: 2024-04-22
Payer: COMMERCIAL

## 2024-04-22 VITALS
WEIGHT: 292 LBS | HEIGHT: 67 IN | HEART RATE: 86 BPM | DIASTOLIC BLOOD PRESSURE: 93 MMHG | SYSTOLIC BLOOD PRESSURE: 142 MMHG | BODY MASS INDEX: 45.83 KG/M2

## 2024-04-22 DIAGNOSIS — M25.551 BILATERAL HIP PAIN: ICD-10-CM

## 2024-04-22 DIAGNOSIS — M47.816 LUMBAR SPONDYLOSIS: Primary | ICD-10-CM

## 2024-04-22 DIAGNOSIS — M54.16 LUMBAR RADICULITIS: ICD-10-CM

## 2024-04-22 DIAGNOSIS — M25.552 BILATERAL HIP PAIN: ICD-10-CM

## 2024-04-22 PROCEDURE — 99214 OFFICE O/P EST MOD 30 MIN: CPT

## 2024-04-22 RX ORDER — MELOXICAM 15 MG/1
15 TABLET ORAL DAILY
Qty: 30 TABLET | Refills: 2 | Status: SHIPPED | OUTPATIENT
Start: 2024-04-22

## 2024-04-22 NOTE — PROGRESS NOTES
Assessment:  1. Lumbar spondylosis    2. Lumbar radiculitis    3. Bilateral hip pain        Plan:  The patient has been experiencing moderate to severe axial spine pain that is causing functional deficit. The pain has been present for at least 3 months and is not improving with conservative care. Currently the patient is not experiencing any radicular features nor neurogenic claudication. Non-facet pathology has been ruled out on clinical evaluation.  The patients pain appears facet mediated on examination today. To help with the low back pain, occurring from facet arthropathy, we discussed medial branch block/radiofrequency ablation. This is a 2 step process, where the patient would first undergo a diagnostic test called a medial branch block injection. If successful, the medial branch block injection would provide % pain relief for a few hours after the procedure. A second confirmatory block will then be completed at least 2 weeks after the first block. The next step would be a radiofrequency ablation. Radiofrequency ablation decreases pain by creating a nerve lesion to interrupt the pain signals, and weaken the pain perceived by the brain. This procedure typically provides 6-18  months of pain relief. The risks, including bleeding, infection, and tissue reaction were reviewed with the patient, and was scheduled for bilateral L3-L5 medial branch block injection.  Patient has undergone bilateral L3-L5 MBB #1 and #2 in the past with a positive response to both, however she never underwent radiofrequency ablation.  Patient states this was due to insurance issues. Because it has been almost 3 years since the preliminary blocks, she is aware we will have to repeat these prior to scheduling the radiofrequency ablation.\  Initiate meloxicam 15 mg once daily for improved pain control.  Patient has tolerated this medication well in the past without side effects.  She was advised to take this medication with food to  avoid any GI upset and to avoid taking other NSAIDs while using this medication.  Follow-up pending response to MBB #1    My impressions and treatment recommendations were discussed in detail with the patient who verbalized understanding and had no further questions.  Discharge instructions were provided. I personally saw and examined the patient and I agree with the above discussed plan of care.    Orders Placed This Encounter   Procedures    FL spine and pain procedure     Standing Status:   Future     Standing Expiration Date:   4/22/2028     Order Specific Question:   Reason for Exam:     Answer:   B/L L3-L5 MBB #1     Order Specific Question:   Is the patient pregnant?     Answer:   No     Order Specific Question:   Anticoagulant hold needed?     Answer:   No     New Medications Ordered This Visit   Medications    meloxicam (Mobic) 15 mg tablet     Sig: Take 1 tablet (15 mg total) by mouth daily     Dispense:  30 tablet     Refill:  2       History of Present Illness:  Karon Rosado is a 48 y.o. female who presents for a follow up office visit in regards to worsening of bilateral low back pain.  Patient is reporting pain localized to the lumbar region of the back bilaterally that does not radiate into the lower extremities.  The pain is constant, and she is currently rating it a 7/10 in intensity.  She describes the quality of her pain as burning, sharp, throbbing, cramping, shooting, numbness, and pins-and-needles.  Patient has previously undergone bilateral L3-L5 medial branch block #1 and #2 with a positive response.  Patient was unable to have RFA done as she was having insurance issues at that time.  Her pain is only worsened in intensity since then.  She does have occasional radicular features in an L5 or S1 pattern in the left lower extremity, however her low back pain is very severe at this time and her leg pain is not very bothersome.    Current pain medication regimen consists of tizanidine 2 mg in  the morning and afternoon and 4 mg nightly.  This is prescribed by rheumatology.    I have personally reviewed and/or updated the patient's past medical history, past surgical history, family history, social history, current medications, allergies, and vital signs today.     Review of Systems   Constitutional:  Positive for activity change. Negative for unexpected weight change.   HENT:  Negative for hearing loss and sinus pain.    Eyes:  Negative for visual disturbance.   Respiratory:  Negative for shortness of breath.    Cardiovascular:  Negative for palpitations.   Gastrointestinal:  Negative for abdominal pain.   Genitourinary:  Negative for difficulty urinating.   Musculoskeletal:  Positive for arthralgias, back pain and joint swelling. Negative for gait problem and myalgias.   Neurological:  Positive for dizziness. Negative for weakness and numbness.   Psychiatric/Behavioral:  Negative for decreased concentration.        Patient Active Problem List   Diagnosis    Tear of medial meniscus of left knee, current    Primary osteoarthritis of left knee    Wheezing    Class 3 severe obesity with body mass index (BMI) of 40.0 to 44.9 in adult ([facility]ly [facility])    Tobacco use    Chronic bilateral low back pain without sciatica    Lumbar spondylosis    Primary osteoarthritis of knees, bilateral    Pain of both wrist joints    Bilateral carpal tunnel syndrome    Chronic pain of both knees    Diffuse pain    Chronic pain syndrome    Myofascial pain syndrome    Fibromyalgia, primary    Type 2 diabetes mellitus without complication, without long-term current use of insulin ([facility])    Lumbar radiculitis       Past Medical History:   Diagnosis Date    Arthritis     Asthma     Chronic pain     Diabetes mellitus ([facility])     Fibromyalgia, primary     Headache(784.0) Always    Hiatal hernia     High cholesterol     History of transfusion     Years ago    Hypertension        Past Surgical History:   Procedure Laterality Date     SECTION       CHOLECYSTECTOMY      FL GUIDED NEEDLE PLAC BX/ASP/INJ  10/27/2021    HERNIA REPAIR      KIDNEY SURGERY      LIVER SURGERY         Family History   Problem Relation Age of Onset    Cancer Mother     Arthritis Mother     Osteoporosis Mother     Diabetes Father     Heart disease Father     Cancer Father        Social History     Occupational History    Not on file   Tobacco Use    Smoking status: Light Smoker     Current packs/day: 0.50     Average packs/day: 0.5 packs/day for 25.0 years (12.5 ttl pk-yrs)     Types: Cigarettes    Smokeless tobacco: Never    Tobacco comments:     in process of quitting 9/7/21   Vaping Use    Vaping status: Never Used   Substance and Sexual Activity    Alcohol use: No    Drug use: No    Sexual activity: Not on file       Current Outpatient Medications on File Prior to Visit   Medication Sig    acetaminophen (TYLENOL) 325 mg tablet Take 650 mg by mouth every 6 (six) hours as needed for mild pain    albuterol (PROVENTIL HFA,VENTOLIN HFA) 90 mcg/act inhaler Inhale 2 puffs as needed     amLODIPine (NORVASC) 10 mg tablet Take 10 mg by mouth daily    ammonium lactate (LAC-HYDRIN) 12 % lotion Apply topically    atorvastatin (LIPITOR) 40 mg tablet Take 40 mg by mouth daily     Azelastine HCl 137 MCG/SPRAY SOLN     ergocalciferol (VITAMIN D2) 50,000 units Take 1 capsule (50,000 Units total) by mouth once a week    fluticasone-salmeterol (Advair) 250-50 mcg/dose inhaler Inhale 1 puff 2 (two) times a day    hydrocortisone 2.5 % cream Apply 1 application. topically 2 (two) times a day To affected area    lisinopril-hydrochlorothiazide (PRINZIDE,ZESTORETIC) 20-25 MG per tablet TAKE 1 TABLET BY MOUTH DAILY. PLEASE NOTE DOSE CHANGE    metFORMIN (GLUCOPHAGE) 500 mg tablet Take 500 mg by mouth 2 (two) times a day with meals    metFORMIN (GLUCOPHAGE-XR) 500 mg 24 hr tablet Take 500 mg by mouth daily with breakfast    montelukast (SINGULAIR) 10 mg tablet TAKE 1 TABLET BY MOUTH EVERY DAY AT NIGHT     pantoprazole (PROTONIX) 40 mg tablet Take 40 mg by mouth 2 (two) times a day    pregabalin (LYRICA) 100 mg capsule Take 1 capsule (100 mg total) by mouth 3 (three) times a day    tiZANidine (ZANAFLEX) 2 mg tablet TAKE 1 TAB IN THE MORNING, 1 TAB IN THE AFTERNOON, AND 2 TABS AT BEDTIME    varenicline (CHANTIX) 1 mg tablet TAKE 1 TABLET BY MOUTH TWICE A DAY WITH A FULL GLASS OF WATER    budesonide-formoterol (SYMBICORT) 160-4.5 mcg/act inhaler Inhale 2 puffs 2 (two) times a day (Patient not taking: Reported on 4/22/2024)    Diclofenac Sodium (VOLTAREN) 1 % Apply 2 g topically 4 (four) times a day (Patient not taking: Reported on 4/22/2024)    DULoxetine (CYMBALTA) 30 mg delayed release capsule TAKE 1 CAPSULE DAILY (Patient not taking: Reported on 4/22/2024)    DULoxetine (CYMBALTA) 60 mg delayed release capsule Take 1 PO QD. (Patient not taking: Reported on 4/22/2024)    fluticasone-umeclidinium-vilanterol (Trelegy Ellipta) 200-62.5-25 MCG/INH AEPB inhaler Inhale 1 puff daily (Patient not taking: Reported on 4/22/2024)    folic acid (FOLVITE) 1 mg tablet Take 1 mg by mouth daily    hydrocortisone 2.5 % ointment Apply topically 2 (two) times a day (Patient not taking: Reported on 4/22/2024)    Lancets (OneTouch Delica Plus Ekqnzz44B) MISC E11.65 (TYPE 2 DIABETES) TEST BLOOD SUGAR IN AM OR AS NEEDED DAILY. (Patient not taking: Reported on 4/22/2024)    lidocaine (Lidoderm) 5 % Apply 1 patch topically daily Remove & Discard patch within 12 hours or as directed by MD (Patient not taking: Reported on 4/22/2024)    ofloxacin (OCUFLOX) 0.3 % ophthalmic solution Apply 1 drop to eye 4 (four) times a day (Patient not taking: Reported on 4/22/2024)    Polyethylene Glycol 3350 POWD Take 17 g by mouth daily (Patient not taking: Reported on 4/22/2024)    Semaglutide,0.25 or 0.5MG/DOS, 2 MG/1.5ML SOPN Inject 0.25 mg under the skin Once a week (Patient not taking: Reported on 4/22/2024)    varenicline 0.5 MG X 11 & 1 MG X 42 tablet  "therapy pack USE AS DIRECTED. GIVE WITH MEALS AND WITH A FULL GLASS OF WATER. (Patient not taking: Reported on 4/22/2024)    [DISCONTINUED] meloxicam (Mobic) 15 mg tablet Take 1 tablet (15 mg total) by mouth daily (Patient not taking: Reported on 4/22/2024)     No current facility-administered medications on file prior to visit.       No Known Allergies    Physical Exam:    /93   Pulse 86   Ht 5' 7\" (1.702 m)   Wt 132 kg (292 lb)   BMI 45.73 kg/m²     Constitutional:normal, well developed, well nourished, alert, in no distress and non-toxic and no overt pain behavior.  Eyes:anicteric  HEENT:grossly intact  Neck:supple, symmetric, trachea midline and no masses   Pulmonary:even and unlabored  Cardiovascular:No edema or pitting edema present  Skin:Normal without rashes or lesions and well hydrated  Psychiatric:Mood and affect appropriate  Neurologic:Cranial Nerves II-XII grossly intact  Musculoskeletal:normal, except for intense pain with lumbar extension and rotation to the right and left.  Tenderness to palpation over the lumbar paraspinal musculature bilaterally in the lower lumbar facet joints.  Ambulates with a cane.  Lower extremity strength is normal.    "

## 2024-04-26 ENCOUNTER — OFFICE VISIT (OUTPATIENT)
Dept: OBGYN CLINIC | Facility: MEDICAL CENTER | Age: 49
End: 2024-04-26
Payer: COMMERCIAL

## 2024-04-26 VITALS
HEART RATE: 64 BPM | DIASTOLIC BLOOD PRESSURE: 96 MMHG | HEIGHT: 67 IN | BODY MASS INDEX: 45.83 KG/M2 | WEIGHT: 292 LBS | SYSTOLIC BLOOD PRESSURE: 148 MMHG

## 2024-04-26 DIAGNOSIS — M17.0 PRIMARY OSTEOARTHRITIS OF KNEES, BILATERAL: Primary | ICD-10-CM

## 2024-04-26 PROCEDURE — 20610 DRAIN/INJ JOINT/BURSA W/O US: CPT | Performed by: ORTHOPAEDIC SURGERY

## 2024-04-26 PROCEDURE — 99214 OFFICE O/P EST MOD 30 MIN: CPT | Performed by: ORTHOPAEDIC SURGERY

## 2024-04-26 RX ORDER — BUPIVACAINE HYDROCHLORIDE 2.5 MG/ML
4 INJECTION, SOLUTION INFILTRATION; PERINEURAL
Status: COMPLETED | OUTPATIENT
Start: 2024-04-26 | End: 2024-04-26

## 2024-04-26 RX ORDER — METHYLPREDNISOLONE ACETATE 40 MG/ML
1 INJECTION, SUSPENSION INTRA-ARTICULAR; INTRALESIONAL; INTRAMUSCULAR; SOFT TISSUE
Status: COMPLETED | OUTPATIENT
Start: 2024-04-26 | End: 2024-04-26

## 2024-04-26 RX ORDER — METHYLPREDNISOLONE ACETATE 80 MG/ML
1 INJECTION, SUSPENSION INTRA-ARTICULAR; INTRALESIONAL; INTRAMUSCULAR; SOFT TISSUE
Status: COMPLETED | OUTPATIENT
Start: 2024-04-26 | End: 2024-04-26

## 2024-04-26 RX ADMIN — BUPIVACAINE HYDROCHLORIDE 4 ML: 2.5 INJECTION, SOLUTION INFILTRATION; PERINEURAL at 12:00

## 2024-04-26 RX ADMIN — METHYLPREDNISOLONE ACETATE 1 ML: 40 INJECTION, SUSPENSION INTRA-ARTICULAR; INTRALESIONAL; INTRAMUSCULAR; SOFT TISSUE at 12:00

## 2024-04-26 RX ADMIN — METHYLPREDNISOLONE ACETATE 1 ML: 80 INJECTION, SUSPENSION INTRA-ARTICULAR; INTRALESIONAL; INTRAMUSCULAR; SOFT TISSUE at 12:00

## 2024-04-26 NOTE — PROGRESS NOTES
"Orthopaedic Surgery - Office Note  Karon Rosado (48 y.o. female)   : 1975   MRN: [id number]  Encounter Date: 2024    Assessment / Plan    The patient does continue to pursue weight loss. She has lost five pounds. We do continue with the recommendation to lose weight, which will help her knee pain as well as allow her to quality for TKA if/when she desires to move forward to surgical treatment of her bilateral knee pain.   Corticosteroid injections were performed for both knees were after discussion of risks and benefits.  Both tolerated well.  Activity as tolerated  ROM without restrictions  Anti-inflammatories or Tylenol prn pain  Return in about 3 months (around 10/28/2023) for follow up with Arias.  Follow-up: 3 months with Arias       Chief Complaint / Date of Onset  Bilateral knee pain   Injury Mechanism / Date  None  Surgery / Date  None    History of Present Illness   Karon Rosado is a 48 y.o. female who presents for follow up of her bilateral knees. She reports the pain is debilitating and severe in both knees. She reports she is trying to lose weight and has lost five pounds, but she would like to lose more. Her insurance company continues to deny weight loss strategies. She would like to attempt bilateral knee CSI again. She denies new trauma. She is tearful and would like to play with her grandkids but is unable to due to the pain     Treatment Summary  Medications / Modalities  Tylenol, NSAIDS  Bracing / Immobilization  None  Physical Therapy  Yes  Injections  Multiple CSI and Visco with mild relief for two weeks per injection   Prior Surgeries  None  Other Treatments  None    Employment / Current Status  Not employed     Sport / Organization / Current Status  none      Review of Systems  Pertinent items are noted in HPI.  All other systems were reviewed and are negative.      Physical Exam  /96   Pulse 64   Ht 5' 7\" (1.702 m)   Wt 132 kg (292 lb)   BMI 45.73 kg/m² "   Cons: Appears well.  No apparent distress.  Psych: Alert. Oriented x3.  Mood and affect normal.  Eyes: PERRLA, EOMI  Resp: Normal effort.  No audible wheezing or stridor.  CV: Palpable pulse.  No discernable arrhythmia.  No LE edema.  Lymph:  No palpable cervical, axillary, or inguinal lymphadenopathy.  Skin: Warm.  No palpable masses.  No visible lesions.  Neuro: Normal muscle tone.  Normal and symmetric DTR's.     Bilateral Knee Exam  Alignment:  Bilateral genu varus.  Inspection:  Moderate swelling.  Palpation:  Moderate effusion to right knee.  ROM:  Knee Extension 15°. Knee Flexion 110°.  Strength:  Not tested.  Stability:  Not tested.  Tests:  No pertinent positive or negative tests.  Patella:  Patella tracks centrally with crepitus.  Neurovascular:  Sensation intact in DP/SP/George/Sa/T nerve distributions.  Palpable DP & PT pulses.  Gait:  Antalgic with a cane         Studies Reviewed  No studies to review      Large joint arthrocentesis: bilateral knee  Universal Protocol:  Consent: Verbal consent obtained.  Consent given by: patient  Patient understanding: patient states understanding of the procedure being performed  Patient identity confirmed: verbally with patient  Supporting Documentation  Indications: pain   Procedure Details  Location: knee - bilateral knee  Needle size: 22 G  Ultrasound guidance: no  Approach: superior    Medications (Right): 4 mL bupivacaine 0.25 %; 1 mL methylPREDNISolone acetate 80 mg/mL; 1 mL methylPREDNISolone acetate 40 mg/mLMedications (Left): 4 mL bupivacaine 0.25 %; 1 mL methylPREDNISolone acetate 40 mg/mL   Patient tolerance: patient tolerated the procedure well with no immediate complications  Dressing:  Sterile dressing applied             Medical, Surgical, Family, and Social History  The patient's medical history, family history, and social history, were reviewed and updated as appropriate.    Past Medical History:   Diagnosis Date    Arthritis     Asthma     Chronic  pain     Diabetes mellitus (HCC)     Fibromyalgia, primary     Headache(784.0) Always    Hiatal hernia     High cholesterol     History of transfusion     Years ago    Hypertension        Past Surgical History:   Procedure Laterality Date     SECTION      CHOLECYSTECTOMY      FL GUIDED NEEDLE PLAC BX/ASP/INJ  10/27/2021    HERNIA REPAIR      KIDNEY SURGERY      LIVER SURGERY         Family History   Problem Relation Age of Onset    Cancer Mother     Arthritis Mother     Osteoporosis Mother     Diabetes Father     Heart disease Father     Cancer Father        Social History     Occupational History    Not on file   Tobacco Use    Smoking status: Light Smoker     Current packs/day: 0.50     Average packs/day: 0.5 packs/day for 25.0 years (12.5 ttl pk-yrs)     Types: Cigarettes    Smokeless tobacco: Never    Tobacco comments:     in process of quitting 19   Vaping Use    Vaping status: Never Used   Substance and Sexual Activity    Alcohol use: No    Drug use: No    Sexual activity: Not on file       No Known Allergies      Current Outpatient Medications:     acetaminophen (TYLENOL) 325 mg tablet, Take 650 mg by mouth every 6 (six) hours as needed for mild pain, Disp: , Rfl:     albuterol (PROVENTIL HFA,VENTOLIN HFA) 90 mcg/act inhaler, Inhale 2 puffs as needed , Disp: , Rfl:     amLODIPine (NORVASC) 10 mg tablet, Take 10 mg by mouth daily, Disp: , Rfl:     ammonium lactate (LAC-HYDRIN) 12 % lotion, Apply topically, Disp: , Rfl:     atorvastatin (LIPITOR) 40 mg tablet, Take 40 mg by mouth daily , Disp: , Rfl:     Azelastine HCl 137 MCG/SPRAY SOLN, , Disp: , Rfl:     ergocalciferol (VITAMIN D2) 50,000 units, Take 1 capsule (50,000 Units total) by mouth once a week, Disp: 12 capsule, Rfl: 2    hydrocortisone 2.5 % cream, Apply 1 application. topically 2 (two) times a day To affected area, Disp: , Rfl:     lisinopril-hydrochlorothiazide (PRINZIDE,ZESTORETIC) 20-25 MG per tablet, TAKE 1 TABLET BY MOUTH DAILY.  PLEASE NOTE DOSE CHANGE, Disp: , Rfl:     meloxicam (Mobic) 15 mg tablet, Take 1 tablet (15 mg total) by mouth daily, Disp: 30 tablet, Rfl: 2    metFORMIN (GLUCOPHAGE) 500 mg tablet, Take 500 mg by mouth 2 (two) times a day with meals, Disp: , Rfl:     metFORMIN (GLUCOPHAGE-XR) 500 mg 24 hr tablet, Take 500 mg by mouth daily with breakfast, Disp: , Rfl:     montelukast (SINGULAIR) 10 mg tablet, TAKE 1 TABLET BY MOUTH EVERY DAY AT NIGHT, Disp: , Rfl:     pantoprazole (PROTONIX) 40 mg tablet, Take 40 mg by mouth 2 (two) times a day, Disp: , Rfl:     pregabalin (LYRICA) 100 mg capsule, Take 1 capsule (100 mg total) by mouth 3 (three) times a day, Disp: 90 capsule, Rfl: 6    tiZANidine (ZANAFLEX) 2 mg tablet, TAKE 1 TAB IN THE MORNING, 1 TAB IN THE AFTERNOON, AND 2 TABS AT BEDTIME, Disp: 360 tablet, Rfl: 5    varenicline (CHANTIX) 1 mg tablet, TAKE 1 TABLET BY MOUTH TWICE A DAY WITH A FULL GLASS OF WATER, Disp: , Rfl:     budesonide-formoterol (SYMBICORT) 160-4.5 mcg/act inhaler, Inhale 2 puffs 2 (two) times a day (Patient not taking: Reported on 4/22/2024), Disp: , Rfl:     Diclofenac Sodium (VOLTAREN) 1 %, Apply 2 g topically 4 (four) times a day (Patient not taking: Reported on 4/22/2024), Disp: 2 g, Rfl: 2    DULoxetine (CYMBALTA) 30 mg delayed release capsule, TAKE 1 CAPSULE DAILY (Patient not taking: Reported on 4/22/2024), Disp: 30 capsule, Rfl: 6    DULoxetine (CYMBALTA) 60 mg delayed release capsule, Take 1 PO QD. (Patient not taking: Reported on 4/22/2024), Disp: 30 capsule, Rfl: 6    fluticasone-salmeterol (Advair) 250-50 mcg/dose inhaler, Inhale 1 puff 2 (two) times a day, Disp: , Rfl:     fluticasone-umeclidinium-vilanterol (Trelegy Ellipta) 200-62.5-25 MCG/INH AEPB inhaler, Inhale 1 puff daily (Patient not taking: Reported on 4/22/2024), Disp: , Rfl:     folic acid (FOLVITE) 1 mg tablet, Take 1 mg by mouth daily, Disp: , Rfl:     hydrocortisone 2.5 % ointment, Apply topically 2 (two) times a day (Patient  not taking: Reported on 4/22/2024), Disp: 30 g, Rfl: 3    Lancets (OneTouch Delica Plus [id number]) MISC, E11.65 (TYPE 2 DIABETES) TEST BLOOD SUGAR IN AM OR AS NEEDED DAILY. (Patient not taking: Reported on 4/22/2024), Disp: , Rfl:     lidocaine (Lidoderm) 5 %, Apply 1 patch topically daily Remove & Discard patch within 12 hours or as directed by MD (Patient not taking: Reported on 4/22/2024), Disp: 90 patch, Rfl: 0    ofloxacin (OCUFLOX) 0.3 % ophthalmic solution, Apply 1 drop to eye 4 (four) times a day (Patient not taking: Reported on 4/22/2024), Disp: , Rfl:     Polyethylene Glycol 3350 POWD, Take 17 g by mouth daily (Patient not taking: Reported on 4/22/2024), Disp: , Rfl:     Semaglutide,0.25 or 0.5MG/DOS, 2 MG/1.5ML SOPN, Inject 0.25 mg under the skin Once a week (Patient not taking: Reported on 4/22/2024), Disp: , Rfl:     varenicline 0.5 MG X 11 & 1 MG X 42 tablet therapy pack, USE AS DIRECTED. GIVE WITH MEALS AND WITH A FULL GLASS OF WATER. (Patient not taking: Reported on 4/22/2024), Disp: , Rfl:        Daquan Rockwell MD    Scribe Attestation      I,:   am acting as a scribe while in the presence of the attending physician.:       I,:   personally performed the services described in this documentation    as scribed in my presence.:

## 2024-05-02 ENCOUNTER — TELEPHONE (OUTPATIENT)
Dept: RADIOLOGY | Facility: MEDICAL CENTER | Age: 49
End: 2024-05-02

## 2024-05-02 NOTE — TELEPHONE ENCOUNTER
Patient has participated in physical therapy for her lower back pain in the past which resulted in severely increased pain.  Patient has also participated in aqua therapy which allowed for free movement while in the water however the pain in her lower back would return immediately upon getting out of the water rendering the therapy ineffective.  Patient is currently unable to properly perform any any exercises or stretches for the lower back with her body habitus severely limiting movement.  Patient is currently unable to stand for periods of time longer than five minutes due to the pressure from her lower back and can not walk any distance without leaning on a cane for support. Patient is extremely unsteady on her feet without support and is very afraid of falling . Patient has tried tylenol and motrin for pain with no relief.

## 2024-05-14 ENCOUNTER — HOSPITAL ENCOUNTER (OUTPATIENT)
Dept: RADIOLOGY | Facility: MEDICAL CENTER | Age: 49
Discharge: HOME | End: 2024-05-14
Payer: COMMERCIAL

## 2024-05-14 VITALS
TEMPERATURE: 97.7 F | OXYGEN SATURATION: 98 % | SYSTOLIC BLOOD PRESSURE: 141 MMHG | DIASTOLIC BLOOD PRESSURE: 93 MMHG | RESPIRATION RATE: 22 BRPM | HEART RATE: 90 BPM

## 2024-05-14 DIAGNOSIS — M47.816 LUMBAR SPONDYLOSIS: ICD-10-CM

## 2024-05-14 PROCEDURE — 64493 INJ PARAVERT F JNT L/S 1 LEV: CPT | Performed by: PHYSICAL MEDICINE & REHABILITATION

## 2024-05-14 PROCEDURE — 64494 INJ PARAVERT F JNT L/S 2 LEV: CPT | Performed by: PHYSICAL MEDICINE & REHABILITATION

## 2024-05-14 RX ADMIN — Medication 3 ML: at 09:44

## 2024-05-14 NOTE — DISCHARGE INSTR - LAB

## 2024-05-14 NOTE — H&P
History of Present Illness: The patient is a 48 y.o. female who presents with complaints of bilateral lower back pain    Past Medical History:   Diagnosis Date    Arthritis     Asthma     Chronic pain     Diabetes mellitus (HCC)     Fibromyalgia, primary     Headache(784.0) Always    Hiatal hernia     High cholesterol     History of transfusion     Years ago    Hypertension        Past Surgical History:   Procedure Laterality Date     SECTION      CHOLECYSTECTOMY      FL GUIDED NEEDLE PLAC BX/ASP/INJ  10/27/2021    HERNIA REPAIR      KIDNEY SURGERY      LIVER SURGERY           Current Outpatient Medications:     acetaminophen (TYLENOL) 325 mg tablet, Take 650 mg by mouth every 6 (six) hours as needed for mild pain, Disp: , Rfl:     albuterol (PROVENTIL HFA,VENTOLIN HFA) 90 mcg/act inhaler, Inhale 2 puffs as needed , Disp: , Rfl:     amLODIPine (NORVASC) 10 mg tablet, Take 10 mg by mouth daily, Disp: , Rfl:     ammonium lactate (LAC-HYDRIN) 12 % lotion, Apply topically, Disp: , Rfl:     atorvastatin (LIPITOR) 40 mg tablet, Take 40 mg by mouth daily , Disp: , Rfl:     Azelastine HCl 137 MCG/SPRAY SOLN, , Disp: , Rfl:     budesonide-formoterol (SYMBICORT) 160-4.5 mcg/act inhaler, Inhale 2 puffs 2 (two) times a day (Patient not taking: Reported on 2024), Disp: , Rfl:     Diclofenac Sodium (VOLTAREN) 1 %, Apply 2 g topically 4 (four) times a day (Patient not taking: Reported on 2024), Disp: 2 g, Rfl: 2    DULoxetine (CYMBALTA) 30 mg delayed release capsule, TAKE 1 CAPSULE DAILY (Patient not taking: Reported on 2024), Disp: 30 capsule, Rfl: 6    DULoxetine (CYMBALTA) 60 mg delayed release capsule, Take 1 PO QD. (Patient not taking: Reported on 2024), Disp: 30 capsule, Rfl: 6    ergocalciferol (VITAMIN D2) 50,000 units, Take 1 capsule (50,000 Units total) by mouth once a week, Disp: 12 capsule, Rfl: 2    fluticasone-salmeterol (Advair) 250-50 mcg/dose inhaler, Inhale 1 puff 2 (two) times a day,  Disp: , Rfl:     fluticasone-umeclidinium-vilanterol (Trelegy Ellipta) 200-62.5-25 MCG/INH AEPB inhaler, Inhale 1 puff daily (Patient not taking: Reported on 4/22/2024), Disp: , Rfl:     folic acid (FOLVITE) 1 mg tablet, Take 1 mg by mouth daily, Disp: , Rfl:     hydrocortisone 2.5 % cream, Apply 1 application. topically 2 (two) times a day To affected area, Disp: , Rfl:     hydrocortisone 2.5 % ointment, Apply topically 2 (two) times a day (Patient not taking: Reported on 4/22/2024), Disp: 30 g, Rfl: 3    Lancets (OneTouch Delica Plus [id number]) MISC, E11.65 (TYPE 2 DIABETES) TEST BLOOD SUGAR IN AM OR AS NEEDED DAILY. (Patient not taking: Reported on 4/22/2024), Disp: , Rfl:     lidocaine (Lidoderm) 5 %, Apply 1 patch topically daily Remove & Discard patch within 12 hours or as directed by MD (Patient not taking: Reported on 4/22/2024), Disp: 90 patch, Rfl: 0    lisinopril-hydrochlorothiazide (PRINZIDE,ZESTORETIC) 20-25 MG per tablet, TAKE 1 TABLET BY MOUTH DAILY. PLEASE NOTE DOSE CHANGE, Disp: , Rfl:     meloxicam (Mobic) 15 mg tablet, Take 1 tablet (15 mg total) by mouth daily, Disp: 30 tablet, Rfl: 2    metFORMIN (GLUCOPHAGE) 500 mg tablet, Take 500 mg by mouth 2 (two) times a day with meals, Disp: , Rfl:     metFORMIN (GLUCOPHAGE-XR) 500 mg 24 hr tablet, Take 500 mg by mouth daily with breakfast, Disp: , Rfl:     montelukast (SINGULAIR) 10 mg tablet, TAKE 1 TABLET BY MOUTH EVERY DAY AT NIGHT, Disp: , Rfl:     ofloxacin (OCUFLOX) 0.3 % ophthalmic solution, Apply 1 drop to eye 4 (four) times a day (Patient not taking: Reported on 4/22/2024), Disp: , Rfl:     pantoprazole (PROTONIX) 40 mg tablet, Take 40 mg by mouth 2 (two) times a day, Disp: , Rfl:     Polyethylene Glycol 3350 POWD, Take 17 g by mouth daily (Patient not taking: Reported on 4/22/2024), Disp: , Rfl:     pregabalin (LYRICA) 100 mg capsule, Take 1 capsule (100 mg total) by mouth 3 (three) times a day, Disp: 90 capsule, Rfl: 6    Semaglutide,0.25 or  0.5MG/DOS, 2 MG/1.5ML SOPN, Inject 0.25 mg under the skin Once a week (Patient not taking: Reported on 4/22/2024), Disp: , Rfl:     tiZANidine (ZANAFLEX) 2 mg tablet, TAKE 1 TAB IN THE MORNING, 1 TAB IN THE AFTERNOON, AND 2 TABS AT BEDTIME, Disp: 360 tablet, Rfl: 5    varenicline (CHANTIX) 1 mg tablet, TAKE 1 TABLET BY MOUTH TWICE A DAY WITH A FULL GLASS OF WATER, Disp: , Rfl:     varenicline 0.5 MG X 11 & 1 MG X 42 tablet therapy pack, USE AS DIRECTED. GIVE WITH MEALS AND WITH A FULL GLASS OF WATER. (Patient not taking: Reported on 4/22/2024), Disp: , Rfl:   No current facility-administered medications for this encounter.    No Known Allergies    Physical Exam:   Vitals:    05/14/24 0935   BP: 150/91   Pulse: 88   Resp: 20   Temp: 97.7 °F (36.5 °C)   SpO2: 99%     General: Awake, Alert, Oriented x 3, Mood and affect appropriate  Respiratory: Respirations even and unlabored  Cardiovascular: Peripheral pulses intact; no edema  Musculoskeletal Exam: bilateral lower back pain    ASA Score: 3    Patient/Chart Verification  Patient ID Verified: Verbal  ID Band Applied: No  Consents Confirmed: Procedural, To be obtained in the Pre-Procedure area  Interval H&P(within 24 hr) Complete (required for Outpatients and Surgery Admit only): To be obtained in the Pre-Procedure area  Allergies Reviewed: Yes  Anticoag/NSAID held?: NA  Currently on antibiotics?: No    Assessment:   1. Lumbar spondylosis        Plan: B/L L3-L5 MBB #1

## 2024-05-15 ENCOUNTER — TELEPHONE (OUTPATIENT)
Dept: RADIOLOGY | Facility: MEDICAL CENTER | Age: 49
End: 2024-05-15

## 2024-05-24 NOTE — TELEPHONE ENCOUNTER
Procedure scheduled 6/7/24.    Reviewed instructions: , NPO 1 hour prior, loose-fitting/comfortable clothes, if ill/fever/infx/abx to call and reschedule.  Also pain level at leat 5/10 and refrain from PRN, as-needed pain meds 6h prior.  Patient stated verbal understanding.

## 2024-06-07 ENCOUNTER — TELEPHONE (OUTPATIENT)
Age: 49
End: 2024-06-07

## 2024-06-07 NOTE — TELEPHONE ENCOUNTER
Caller: Patient     Doctor:      Reason for call: Patient woke up bot feeling well and needs to cancel procedure today.     She will call back to reschedule     Call back#: [phone]

## 2024-07-22 DIAGNOSIS — M25.551 BILATERAL HIP PAIN: ICD-10-CM

## 2024-07-22 DIAGNOSIS — M25.552 BILATERAL HIP PAIN: ICD-10-CM

## 2024-07-22 RX ORDER — MELOXICAM 15 MG/1
15 TABLET ORAL DAILY
Qty: 30 TABLET | Refills: 2 | OUTPATIENT
Start: 2024-07-22

## 2024-07-22 RX ORDER — MELOXICAM 15 MG/1
15 TABLET ORAL DAILY
Qty: 30 TABLET | Refills: 1 | Status: SHIPPED | OUTPATIENT
Start: 2024-07-22

## 2024-07-22 NOTE — TELEPHONE ENCOUNTER
S/w pt. Pt had Lumbar MBB #1 in May and has not been able to reschedule due to  having open heart surgery. Per pt  she is ready to schedule procedure now. Please assist with scheduling.     Pt is requesting a refill of her Meloxicam.  Med last ordered 4/22 with 2 refills. Denies side effects and med is providing some relief.  Pt is requesting a refill. Pt plans to schedule her MBB #2  and will schedule a follow up ov for future refills.     Please advise- Is it possible for pt to have a refill of her Meloxicam?

## 2024-07-26 ENCOUNTER — OFFICE VISIT (OUTPATIENT)
Dept: OBGYN CLINIC | Facility: MEDICAL CENTER | Age: 49
End: 2024-07-26
Payer: COMMERCIAL

## 2024-07-26 VITALS
BODY MASS INDEX: 45.83 KG/M2 | SYSTOLIC BLOOD PRESSURE: 156 MMHG | DIASTOLIC BLOOD PRESSURE: 91 MMHG | HEIGHT: 67 IN | HEART RATE: 98 BPM | WEIGHT: 292 LBS

## 2024-07-26 DIAGNOSIS — M17.0 BILATERAL PRIMARY OSTEOARTHRITIS OF KNEE: Primary | ICD-10-CM

## 2024-07-26 DIAGNOSIS — E11.9 TYPE 2 DIABETES MELLITUS WITHOUT COMPLICATION, WITHOUT LONG-TERM CURRENT USE OF INSULIN (HCC): ICD-10-CM

## 2024-07-26 PROCEDURE — 99213 OFFICE O/P EST LOW 20 MIN: CPT | Performed by: ORTHOPAEDIC SURGERY

## 2024-07-26 NOTE — PROGRESS NOTES
"Orthopaedic Surgery - Office Note  Karon Rosado (48 y.o. female)   : 1975   MRN: [id number]  Encounter Date: 2024    Assessment / Plan    Bilateral primary osteoarthritis of knees  Patient was encouraged to continue to work on weight management.  Patient decided she would like to move forward with bilateral joint injections once visco approved by insurance.  Follow-up:  Return for injections with Arias Hart PA-C once approved.      Chief Complaint / Date of Onset  Bilateral knee pain   Injury Mechanism / Date  None  Surgery / Date  None    History of Present Illness   Karon Rosado is a 48 y.o. female who presents for follow up of the above listed condition. She states that the CSI from her last visit back in April provided 10% relief for approximately 1 week. She states that exercise is very difficult due to pain, but she states she continues to eat healthier. She also states that she has had increase in responsibilities since her  just had open heart surgery. She states that she does have numbness and tingling that radiates down into her feet. She states that she has a good support system for everything that is going on.    Treatment Summary  Medications / Modalities  OTC pain meds  Bracing / Immobilization  None  Physical Therapy  Hydrotherapy 1 year ago, but felt bad immediately after and had no relief  Injections  CSI with 1 week effect of 10% - 24  Prior Surgeries  None  Other Treatments  None    Employment / Current Status  Not employed      Sport / Organization / Current Status  none      Review of Systems  Pertinent items are noted in HPI.  All other systems were reviewed and are negative.      Physical Exam  /91   Pulse 98   Ht 5' 7\" (1.702 m)   Wt 132 kg (292 lb)   BMI 45.73 kg/m²   Cons: Appears well.  No apparent distress.  Psych: Alert. Oriented x3.  Mood and affect normal.  Eyes: PERRLA, EOMI  Resp: Normal effort.  No audible wheezing or stridor.  CV: Palpable " pulse.  No discernable arrhythmia.  No LE edema.  Lymph:  No palpable cervical, axillary, or inguinal lymphadenopathy.  Skin: Warm.  No palpable masses.  No visible lesions.  Neuro: Normal muscle tone.  Normal and symmetric DTR's.     Bilateral Knee Exam  Alignment:  Normal knee alignment.  Inspection:  No swelling. No edema. No erythema.  Palpation:   Tender to palpate   ROM:  Not tested.  Strength:  Not tested.  Stability:  Not tested.  Tests:  No pertinent positive or negative tests.  Patella:  Not tested.  Neurovascular:  Sensation intact in DP/SP/George/Sa/T nerve distributions.  2+ DP & PT pulses.  Gait:  Steady with cane.       Studies Reviewed  No studies to review      Procedures  No procedures today.    Medical, Surgical, Family, and Social History  The patient's medical history, family history, and social history, were reviewed and updated as appropriate.    Past Medical History:   Diagnosis Date    Arthritis     Asthma     Chronic pain     Diabetes mellitus (HCC)     Fibromyalgia, primary     Headache(784.0) Always    Hiatal hernia     High cholesterol     History of transfusion     Years ago    Hypertension        Past Surgical History:   Procedure Laterality Date     SECTION      CHOLECYSTECTOMY      FL GUIDED NEEDLE PLAC BX/ASP/INJ  10/27/2021    HERNIA REPAIR      KIDNEY SURGERY      LIVER SURGERY         Family History   Problem Relation Age of Onset    Cancer Mother     Arthritis Mother     Osteoporosis Mother     Diabetes Father     Heart disease Father     Cancer Father        Social History     Occupational History    Not on file   Tobacco Use    Smoking status: Light Smoker     Current packs/day: 0.50     Average packs/day: 0.5 packs/day for 25.0 years (12.5 ttl pk-yrs)     Types: Cigarettes    Smokeless tobacco: Never    Tobacco comments:     in process of quitting 19   Vaping Use    Vaping status: Never Used   Substance and Sexual Activity    Alcohol use: No    Drug use: No    Sexual  activity: Not on file       No Known Allergies      Current Outpatient Medications:     acetaminophen (TYLENOL) 325 mg tablet, Take 650 mg by mouth every 6 (six) hours as needed for mild pain, Disp: , Rfl:     albuterol (PROVENTIL HFA,VENTOLIN HFA) 90 mcg/act inhaler, Inhale 2 puffs as needed , Disp: , Rfl:     amLODIPine (NORVASC) 10 mg tablet, Take 10 mg by mouth daily, Disp: , Rfl:     ammonium lactate (LAC-HYDRIN) 12 % lotion, Apply topically, Disp: , Rfl:     atorvastatin (LIPITOR) 40 mg tablet, Take 40 mg by mouth daily , Disp: , Rfl:     Azelastine HCl 137 MCG/SPRAY SOLN, , Disp: , Rfl:     Diclofenac Sodium (VOLTAREN) 1 %, Apply 2 g topically 4 (four) times a day, Disp: 2 g, Rfl: 2    ergocalciferol (VITAMIN D2) 50,000 units, Take 1 capsule (50,000 Units total) by mouth once a week, Disp: 12 capsule, Rfl: 2    fluticasone-umeclidinium-vilanterol (Trelegy Ellipta) 200-62.5-25 MCG/INH AEPB inhaler, Inhale 1 puff daily, Disp: , Rfl:     meloxicam (Mobic) 15 mg tablet, Take 1 tablet (15 mg total) by mouth daily, Disp: 30 tablet, Rfl: 1    budesonide-formoterol (SYMBICORT) 160-4.5 mcg/act inhaler, Inhale 2 puffs 2 (two) times a day (Patient not taking: Reported on 4/22/2024), Disp: , Rfl:     DULoxetine (CYMBALTA) 30 mg delayed release capsule, TAKE 1 CAPSULE DAILY (Patient not taking: Reported on 4/22/2024), Disp: 30 capsule, Rfl: 6    DULoxetine (CYMBALTA) 60 mg delayed release capsule, Take 1 PO QD. (Patient not taking: Reported on 4/22/2024), Disp: 30 capsule, Rfl: 6    fluticasone-salmeterol (Advair) 250-50 mcg/dose inhaler, Inhale 1 puff 2 (two) times a day, Disp: , Rfl:     folic acid (FOLVITE) 1 mg tablet, Take 1 mg by mouth daily, Disp: , Rfl:     hydrocortisone 2.5 % cream, Apply 1 application. topically 2 (two) times a day To affected area, Disp: , Rfl:     hydrocortisone 2.5 % ointment, Apply topically 2 (two) times a day (Patient not taking: Reported on 4/22/2024), Disp: 30 g, Rfl: 3    Lancets  (OneTouch Delica Plus [id number]) MISC, E11.65 (TYPE 2 DIABETES) TEST BLOOD SUGAR IN AM OR AS NEEDED DAILY. (Patient not taking: Reported on 4/22/2024), Disp: , Rfl:     lidocaine (Lidoderm) 5 %, Apply 1 patch topically daily Remove & Discard patch within 12 hours or as directed by MD (Patient not taking: Reported on 4/22/2024), Disp: 90 patch, Rfl: 0    lisinopril-hydrochlorothiazide (PRINZIDE,ZESTORETIC) 20-25 MG per tablet, TAKE 1 TABLET BY MOUTH DAILY. PLEASE NOTE DOSE CHANGE, Disp: , Rfl:     metFORMIN (GLUCOPHAGE) 500 mg tablet, Take 500 mg by mouth 2 (two) times a day with meals, Disp: , Rfl:     metFORMIN (GLUCOPHAGE-XR) 500 mg 24 hr tablet, Take 500 mg by mouth daily with breakfast, Disp: , Rfl:     montelukast (SINGULAIR) 10 mg tablet, TAKE 1 TABLET BY MOUTH EVERY DAY AT NIGHT, Disp: , Rfl:     ofloxacin (OCUFLOX) 0.3 % ophthalmic solution, Apply 1 drop to eye 4 (four) times a day (Patient not taking: Reported on 4/22/2024), Disp: , Rfl:     pantoprazole (PROTONIX) 40 mg tablet, Take 40 mg by mouth 2 (two) times a day, Disp: , Rfl:     Polyethylene Glycol 3350 POWD, Take 17 g by mouth daily (Patient not taking: Reported on 4/22/2024), Disp: , Rfl:     pregabalin (LYRICA) 100 mg capsule, Take 1 capsule (100 mg total) by mouth 3 (three) times a day, Disp: 90 capsule, Rfl: 6    Semaglutide,0.25 or 0.5MG/DOS, 2 MG/1.5ML SOPN, Inject 0.25 mg under the skin Once a week (Patient not taking: Reported on 4/22/2024), Disp: , Rfl:     tiZANidine (ZANAFLEX) 2 mg tablet, TAKE 1 TAB IN THE MORNING, 1 TAB IN THE AFTERNOON, AND 2 TABS AT BEDTIME, Disp: 360 tablet, Rfl: 5    varenicline (CHANTIX) 1 mg tablet, TAKE 1 TABLET BY MOUTH TWICE A DAY WITH A FULL GLASS OF WATER, Disp: , Rfl:     varenicline 0.5 MG X 11 & 1 MG X 42 tablet therapy pack, USE AS DIRECTED. GIVE WITH MEALS AND WITH A FULL GLASS OF WATER. (Patient not taking: Reported on 4/22/2024), Disp: , Rfl:       Bertrand Rizzo Attestation      I,:  Bertrand  Shyam am acting as a scribe while in the presence of the attending physician.:       I,:  Jacob Galeano MD personally performed the services described in this documentation    as scribed in my presence.:

## 2024-08-05 ENCOUNTER — PROCEDURE VISIT (OUTPATIENT)
Dept: OBGYN CLINIC | Facility: MEDICAL CENTER | Age: 49
End: 2024-08-05
Payer: COMMERCIAL

## 2024-08-05 VITALS
SYSTOLIC BLOOD PRESSURE: 114 MMHG | WEIGHT: 292 LBS | HEART RATE: 94 BPM | BODY MASS INDEX: 45.83 KG/M2 | HEIGHT: 67 IN | DIASTOLIC BLOOD PRESSURE: 76 MMHG

## 2024-08-05 DIAGNOSIS — M17.0 BILATERAL PRIMARY OSTEOARTHRITIS OF KNEE: Primary | ICD-10-CM

## 2024-08-05 PROCEDURE — 20610 DRAIN/INJ JOINT/BURSA W/O US: CPT | Performed by: PHYSICIAN ASSISTANT

## 2024-08-05 RX ORDER — ROPIVACAINE HYDROCHLORIDE 5 MG/ML
5 INJECTION, SOLUTION EPIDURAL; INFILTRATION; PERINEURAL
Status: COMPLETED | OUTPATIENT
Start: 2024-08-05 | End: 2024-08-05

## 2024-08-05 RX ADMIN — ROPIVACAINE HYDROCHLORIDE 5 ML: 5 INJECTION, SOLUTION EPIDURAL; INFILTRATION; PERINEURAL at 12:30

## 2024-08-05 NOTE — PROGRESS NOTES
Orthopaedic Surgery - Office Note  Karon Rosado (48 y.o. female)   : 1975   MRN: [id number]  Encounter Date: 2024    Assessment / Plan    Bilateral primary osteoarthritis of knees  The patient does continue to pursue weight loss.  Her insurance continues to deny any attempts to pursue weight loss surgery.   We do continue with the recommendation to lose weight, which will help her knee pain as well as allow her to quality for TKA if/when she desires to move forward to surgical treatment of her bilateral knee pain.   Durolane injections were performed for both knees were after discussion of risks and benefits.  Both tolerated well.  Activity as tolerated  ROM without restrictions  Anti-inflammatories or Tylenol prn pain  Follow-up:  Return in about 3 months (around 2024) for Follow-up with Arias.      Chief Complaint / Date of Onset  Bilateral knee pain   Injury Mechanism / Date  None  Surgery / Date  None    History of Present Illness   Karon Rosado is a 48 y.o. female who presents for follow up of the above listed condition. She states that the CSI from her last visit on 2024 did help but not as much as previously.  She has had viscosupplementation with Durolane on 2023 which did help her for an extended period of time.  Her pain at this time is a 6 out of 10 bilaterally with ambulation and typically at least a 4 out of 10 at rest.. She states that exercise is very difficult due to pain, but she states she continues to eat healthier. She also states that she has had increase in responsibilities since her  just had open heart surgery. She states that she does have numbness and tingling that radiates down into her feet.  She is scheduled with spine and pain to have an ablation of the lumbar spine in the near future.    Treatment Summary  Medications / Modalities  OTC pain meds  Bracing / Immobilization  None  Physical Therapy  Hydrotherapy 1 year ago, but felt bad immediately  "after and had no relief  Injections  Durolane injections bilaterally on 7/28/2023 and prior in 2020. CSI last on 4/26/24  Prior Surgeries  None  Other Treatments  None    Employment / Current Status  Not employed      Sport / Organization / Current Status  none      Review of Systems  Pertinent items are noted in HPI.  All other systems were reviewed and are negative.      Physical Exam  /76   Pulse 94   Ht 5' 7\" (1.702 m)   Wt 132 kg (292 lb)   BMI 45.73 kg/m²   Cons: Appears well.  No apparent distress.  Psych: Alert. Oriented x3.  Mood and affect normal.  Eyes: PERRLA, EOMI  Resp: Normal effort.  No audible wheezing or stridor.  CV: Palpable pulse.  No discernable arrhythmia.  No LE edema.  Lymph:  No palpable cervical, axillary, or inguinal lymphadenopathy.  Skin: Warm.  No palpable masses.  No visible lesions.  Neuro: Normal muscle tone.  Normal and symmetric DTR's.     Bilateral Knee Exam  Alignment:  Normal knee alignment.  Inspection:  No swelling. No edema. No erythema.  Palpation:   Tender to palpate   ROM:  Not tested. Knee Extension 15 degrees. Knee Flexion 110 degrees.  Strength:  Not tested.  Stability:  Not tested.  Tests:  No pertinent positive or negative tests.  Patella:  Not tested.  Neurovascular:  Sensation intact in DP/SP/George/Sa/T nerve distributions.  2+ DP & PT pulses.  Gait:  Steady with cane.       Studies Reviewed  No studies to review      Large joint arthrocentesis: bilateral knee  Universal Protocol:  Consent: Verbal consent obtained.  Consent given by: patient  Patient identity confirmed: verbally with patient  Supporting Documentation  Indications: pain   Procedure Details  Location: knee - bilateral knee  Needle size: 22 G  Approach: lateral    Medications (Right): 5 mL ropivacaine 0.5 %; 3 mL sodium hyaluronate 60 MG/3MLMedications (Left): 5 mL ropivacaine 0.5 %; 3 mL sodium hyaluronate 60 MG/3ML   Patient tolerance: patient tolerated the procedure well with no immediate " complications  Dressing:  Sterile dressing applied        No procedures today.    Medical, Surgical, Family, and Social History  The patient's medical history, family history, and social history, were reviewed and updated as appropriate.    Past Medical History:   Diagnosis Date    Arthritis     Asthma     Chronic pain     Diabetes mellitus (HCC)     Fibromyalgia, primary     Headache(784.0) Always    Hiatal hernia     High cholesterol     History of transfusion     Years ago    Hypertension        Past Surgical History:   Procedure Laterality Date     SECTION      CHOLECYSTECTOMY      FL GUIDED NEEDLE PLAC BX/ASP/INJ  10/27/2021    HERNIA REPAIR      KIDNEY SURGERY      LIVER SURGERY         Family History   Problem Relation Age of Onset    Cancer Mother     Arthritis Mother     Osteoporosis Mother     Diabetes Father     Heart disease Father     Cancer Father        Social History     Occupational History    Not on file   Tobacco Use    Smoking status: Light Smoker     Current packs/day: 0.50     Average packs/day: 0.5 packs/day for 25.0 years (12.5 ttl pk-yrs)     Types: Cigarettes    Smokeless tobacco: Never    Tobacco comments:     in process of quitting 19   Vaping Use    Vaping status: Never Used   Substance and Sexual Activity    Alcohol use: No    Drug use: No    Sexual activity: Not on file       No Known Allergies      Current Outpatient Medications:     acetaminophen (TYLENOL) 325 mg tablet, Take 650 mg by mouth every 6 (six) hours as needed for mild pain, Disp: , Rfl:     albuterol (PROVENTIL HFA,VENTOLIN HFA) 90 mcg/act inhaler, Inhale 2 puffs as needed , Disp: , Rfl:     amLODIPine (NORVASC) 10 mg tablet, Take 10 mg by mouth daily, Disp: , Rfl:     ammonium lactate (LAC-HYDRIN) 12 % lotion, Apply topically, Disp: , Rfl:     atorvastatin (LIPITOR) 40 mg tablet, Take 40 mg by mouth daily , Disp: , Rfl:     Azelastine HCl 137 MCG/SPRAY SOLN, , Disp: , Rfl:     Diclofenac Sodium (VOLTAREN) 1  %, Apply 2 g topically 4 (four) times a day, Disp: 2 g, Rfl: 2    DULoxetine (CYMBALTA) 30 mg delayed release capsule, TAKE 1 CAPSULE DAILY, Disp: 30 capsule, Rfl: 6    DULoxetine (CYMBALTA) 60 mg delayed release capsule, Take 1 PO QD., Disp: 30 capsule, Rfl: 6    ergocalciferol (VITAMIN D2) 50,000 units, Take 1 capsule (50,000 Units total) by mouth once a week, Disp: 12 capsule, Rfl: 2    fluticasone-umeclidinium-vilanterol (Trelegy Ellipta) 200-62.5-25 MCG/INH AEPB inhaler, Inhale 1 puff daily, Disp: , Rfl:     hydrocortisone 2.5 % cream, Apply 1 application. topically 2 (two) times a day To affected area, Disp: , Rfl:     hydrocortisone 2.5 % ointment, Apply topically 2 (two) times a day, Disp: 30 g, Rfl: 3    Lancets (OneTouch Delica Plus [id number]) MISC, , Disp: , Rfl:     lisinopril-hydrochlorothiazide (PRINZIDE,ZESTORETIC) 20-25 MG per tablet, TAKE 1 TABLET BY MOUTH DAILY. PLEASE NOTE DOSE CHANGE, Disp: , Rfl:     meloxicam (Mobic) 15 mg tablet, Take 1 tablet (15 mg total) by mouth daily, Disp: 30 tablet, Rfl: 1    metFORMIN (GLUCOPHAGE) 500 mg tablet, Take 500 mg by mouth 2 (two) times a day with meals, Disp: , Rfl:     metFORMIN (GLUCOPHAGE-XR) 500 mg 24 hr tablet, Take 500 mg by mouth daily with breakfast, Disp: , Rfl:     montelukast (SINGULAIR) 10 mg tablet, TAKE 1 TABLET BY MOUTH EVERY DAY AT NIGHT, Disp: , Rfl:     ofloxacin (OCUFLOX) 0.3 % ophthalmic solution, Apply 1 drop to eye 4 (four) times a day, Disp: , Rfl:     pantoprazole (PROTONIX) 40 mg tablet, Take 40 mg by mouth 2 (two) times a day, Disp: , Rfl:     Polyethylene Glycol 3350 POWD, Take 17 g by mouth daily, Disp: , Rfl:     pregabalin (LYRICA) 100 mg capsule, Take 1 capsule (100 mg total) by mouth 3 (three) times a day, Disp: 90 capsule, Rfl: 6    tiZANidine (ZANAFLEX) 2 mg tablet, TAKE 1 TAB IN THE MORNING, 1 TAB IN THE AFTERNOON, AND 2 TABS AT BEDTIME, Disp: 360 tablet, Rfl: 5    varenicline (CHANTIX) 1 mg tablet, TAKE 1 TABLET BY MOUTH  TWICE A DAY WITH A FULL GLASS OF WATER, Disp: , Rfl:     budesonide-formoterol (SYMBICORT) 160-4.5 mcg/act inhaler, Inhale 2 puffs 2 (two) times a day (Patient not taking: Reported on 4/22/2024), Disp: , Rfl:     fluticasone-salmeterol (Advair) 250-50 mcg/dose inhaler, Inhale 1 puff 2 (two) times a day, Disp: , Rfl:     folic acid (FOLVITE) 1 mg tablet, Take 1 mg by mouth daily, Disp: , Rfl:     lidocaine (Lidoderm) 5 %, Apply 1 patch topically daily Remove & Discard patch within 12 hours or as directed by MD (Patient not taking: Reported on 4/22/2024), Disp: 90 patch, Rfl: 0    Semaglutide,0.25 or 0.5MG/DOS, 2 MG/1.5ML SOPN, Inject 0.25 mg under the skin Once a week (Patient not taking: Reported on 4/22/2024), Disp: , Rfl:     varenicline 0.5 MG X 11 & 1 MG X 42 tablet therapy pack, USE AS DIRECTED. GIVE WITH MEALS AND WITH A FULL GLASS OF WATER. (Patient not taking: Reported on 4/22/2024), Disp: , Rfl:       Arias Hart PA-C    Scribe Attestation      I,:   am acting as a scribe while in the presence of the attending physician.:       I,:   personally performed the services described in this documentation    as scribed in my presence.:

## 2024-08-16 ENCOUNTER — HOSPITAL ENCOUNTER (OUTPATIENT)
Dept: RADIOLOGY | Facility: MEDICAL CENTER | Age: 49
End: 2024-08-16
Payer: COMMERCIAL

## 2024-08-16 VITALS
OXYGEN SATURATION: 97 % | RESPIRATION RATE: 18 BRPM | TEMPERATURE: 98 F | SYSTOLIC BLOOD PRESSURE: 144 MMHG | DIASTOLIC BLOOD PRESSURE: 97 MMHG | HEART RATE: 90 BPM

## 2024-08-16 DIAGNOSIS — M47.816 LUMBAR SPONDYLOSIS: ICD-10-CM

## 2024-08-16 PROCEDURE — 64493 INJ PARAVERT F JNT L/S 1 LEV: CPT | Performed by: PHYSICAL MEDICINE & REHABILITATION

## 2024-08-16 PROCEDURE — 64494 INJ PARAVERT F JNT L/S 2 LEV: CPT | Performed by: PHYSICAL MEDICINE & REHABILITATION

## 2024-08-16 RX ORDER — BUPIVACAINE HYDROCHLORIDE 5 MG/ML
3 INJECTION, SOLUTION EPIDURAL; INTRACAUDAL ONCE
Status: COMPLETED | OUTPATIENT
Start: 2024-08-16 | End: 2024-08-16

## 2024-08-16 RX ADMIN — BUPIVACAINE HYDROCHLORIDE 3 ML: 5 INJECTION, SOLUTION EPIDURAL; INTRACAUDAL at 10:35

## 2024-08-16 NOTE — DISCHARGE INSTRUCTIONS

## 2024-08-16 NOTE — H&P
History of Present Illness: The patient is a 48 y.o. female who presents with complaints of bilateral lower back pain    Past Medical History:   Diagnosis Date    Arthritis     Asthma     Chronic pain     Diabetes mellitus (HCC)     Fibromyalgia, primary     Headache(784.0) Always    Hiatal hernia     High cholesterol     History of transfusion     Years ago    Hypertension        Past Surgical History:   Procedure Laterality Date     SECTION      CHOLECYSTECTOMY      FL GUIDED NEEDLE PLAC BX/ASP/INJ  10/27/2021    HERNIA REPAIR      KIDNEY SURGERY      LIVER SURGERY           Current Outpatient Medications:     acetaminophen (TYLENOL) 325 mg tablet, Take 650 mg by mouth every 6 (six) hours as needed for mild pain, Disp: , Rfl:     albuterol (PROVENTIL HFA,VENTOLIN HFA) 90 mcg/act inhaler, Inhale 2 puffs as needed , Disp: , Rfl:     amLODIPine (NORVASC) 10 mg tablet, Take 10 mg by mouth daily, Disp: , Rfl:     ammonium lactate (LAC-HYDRIN) 12 % lotion, Apply topically, Disp: , Rfl:     atorvastatin (LIPITOR) 40 mg tablet, Take 40 mg by mouth daily , Disp: , Rfl:     Azelastine HCl 137 MCG/SPRAY SOLN, , Disp: , Rfl:     budesonide-formoterol (SYMBICORT) 160-4.5 mcg/act inhaler, Inhale 2 puffs 2 (two) times a day (Patient not taking: Reported on 2024), Disp: , Rfl:     Diclofenac Sodium (VOLTAREN) 1 %, Apply 2 g topically 4 (four) times a day, Disp: 2 g, Rfl: 2    DULoxetine (CYMBALTA) 30 mg delayed release capsule, TAKE 1 CAPSULE DAILY, Disp: 30 capsule, Rfl: 6    DULoxetine (CYMBALTA) 60 mg delayed release capsule, Take 1 PO QD., Disp: 30 capsule, Rfl: 6    ergocalciferol (VITAMIN D2) 50,000 units, Take 1 capsule (50,000 Units total) by mouth once a week, Disp: 12 capsule, Rfl: 2    fluticasone-salmeterol (Advair) 250-50 mcg/dose inhaler, Inhale 1 puff 2 (two) times a day, Disp: , Rfl:     fluticasone-umeclidinium-vilanterol (Trelegy Ellipta) 200-62.5-25 MCG/INH AEPB inhaler, Inhale 1 puff daily, Disp:  , Rfl:     folic acid (FOLVITE) 1 mg tablet, Take 1 mg by mouth daily, Disp: , Rfl:     hydrocortisone 2.5 % cream, Apply 1 application. topically 2 (two) times a day To affected area, Disp: , Rfl:     hydrocortisone 2.5 % ointment, Apply topically 2 (two) times a day, Disp: 30 g, Rfl: 3    Lancets (OneTouch Delica Plus [id number]) MISC, , Disp: , Rfl:     lidocaine (Lidoderm) 5 %, Apply 1 patch topically daily Remove & Discard patch within 12 hours or as directed by MD (Patient not taking: Reported on 4/22/2024), Disp: 90 patch, Rfl: 0    lisinopril-hydrochlorothiazide (PRINZIDE,ZESTORETIC) 20-25 MG per tablet, TAKE 1 TABLET BY MOUTH DAILY. PLEASE NOTE DOSE CHANGE, Disp: , Rfl:     meloxicam (Mobic) 15 mg tablet, Take 1 tablet (15 mg total) by mouth daily, Disp: 30 tablet, Rfl: 1    metFORMIN (GLUCOPHAGE) 500 mg tablet, Take 500 mg by mouth 2 (two) times a day with meals, Disp: , Rfl:     metFORMIN (GLUCOPHAGE-XR) 500 mg 24 hr tablet, Take 500 mg by mouth daily with breakfast, Disp: , Rfl:     montelukast (SINGULAIR) 10 mg tablet, TAKE 1 TABLET BY MOUTH EVERY DAY AT NIGHT, Disp: , Rfl:     ofloxacin (OCUFLOX) 0.3 % ophthalmic solution, Apply 1 drop to eye 4 (four) times a day, Disp: , Rfl:     pantoprazole (PROTONIX) 40 mg tablet, Take 40 mg by mouth 2 (two) times a day, Disp: , Rfl:     Polyethylene Glycol 3350 POWD, Take 17 g by mouth daily, Disp: , Rfl:     pregabalin (LYRICA) 100 mg capsule, Take 1 capsule (100 mg total) by mouth 3 (three) times a day, Disp: 90 capsule, Rfl: 6    Semaglutide,0.25 or 0.5MG/DOS, 2 MG/1.5ML SOPN, Inject 0.25 mg under the skin Once a week (Patient not taking: Reported on 4/22/2024), Disp: , Rfl:     tiZANidine (ZANAFLEX) 2 mg tablet, TAKE 1 TAB IN THE MORNING, 1 TAB IN THE AFTERNOON, AND 2 TABS AT BEDTIME, Disp: 360 tablet, Rfl: 5    varenicline 0.5 MG X 11 & 1 MG X 42 tablet therapy pack, USE AS DIRECTED. GIVE WITH MEALS AND WITH A FULL GLASS OF WATER. (Patient not taking: Reported  on 4/22/2024), Disp: , Rfl:     No Known Allergies    Physical Exam:   Vitals:    08/16/24 1010   BP: 143/85   Pulse: 85   Resp: 20   Temp: 98 °F (36.7 °C)   SpO2: 97%     General: Awake, Alert, Oriented x 3, Mood and affect appropriate  Respiratory: Respirations even and unlabored  Cardiovascular: Peripheral pulses intact; no edema  Musculoskeletal Exam: bilateral lower back pain    ASA Score: 3    Patient/Chart Verification  Patient ID Verified: Verbal  ID Band Applied: No  Consents Confirmed: Procedural, To be obtained in the Pre-Procedure area  H&P( within 30 days) Verified: To be obtained in the Pre-Procedure area  Interval H&P(within 24 hr) Complete (required for Outpatients and Surgery Admit only): To be obtained in the Pre-Procedure area  Allergies Reviewed: Yes  Anticoag/NSAID held?: NA  Currently on antibiotics?: No  Pregnancy denied?: Yes    Assessment:   1. Lumbar spondylosis        Plan: B/L L3-L5 MBB #2 (38037, [id number])

## 2024-08-19 ENCOUNTER — TELEPHONE (OUTPATIENT)
Dept: RADIOLOGY | Facility: MEDICAL CENTER | Age: 49
End: 2024-08-19

## 2024-08-19 NOTE — TELEPHONE ENCOUNTER
Procedures scheduled 9/10/24 and 9/24/24    Reviewed instructions: , NPO 1 hour prior, loose-fitting/comfortable clothing, if ill/fever/infx/abx to call and reschedule.  No need to hold any meds prior.  Patient stated verbal understanding.

## 2024-09-10 ENCOUNTER — HOSPITAL ENCOUNTER (OUTPATIENT)
Dept: RADIOLOGY | Facility: MEDICAL CENTER | Age: 49
Discharge: HOME | End: 2024-09-10
Admitting: PHYSICAL MEDICINE & REHABILITATION
Payer: COMMERCIAL

## 2024-09-10 ENCOUNTER — TELEPHONE (OUTPATIENT)
Dept: PAIN MEDICINE | Facility: MEDICAL CENTER | Age: 49
End: 2024-09-10

## 2024-09-10 VITALS
HEART RATE: 91 BPM | SYSTOLIC BLOOD PRESSURE: 157 MMHG | RESPIRATION RATE: 20 BRPM | TEMPERATURE: 98.6 F | DIASTOLIC BLOOD PRESSURE: 74 MMHG | OXYGEN SATURATION: 97 %

## 2024-09-10 DIAGNOSIS — M47.816 LUMBAR SPONDYLOSIS: ICD-10-CM

## 2024-09-10 PROCEDURE — 64635 DESTROY LUMB/SAC FACET JNT: CPT | Performed by: PHYSICAL MEDICINE & REHABILITATION

## 2024-09-10 PROCEDURE — 64636 DESTROY L/S FACET JNT ADDL: CPT | Performed by: PHYSICAL MEDICINE & REHABILITATION

## 2024-09-10 RX ORDER — TERBINAFINE HYDROCHLORIDE 250 MG/1
250 TABLET ORAL DAILY
COMMUNITY
Start: 2024-08-22 | End: 2024-10-03

## 2024-09-10 RX ADMIN — Medication 5 ML: at 11:48

## 2024-09-10 NOTE — H&P
History of Present Illness: The patient is a 48 y.o. female who presents with complaints of right low back pain    Past Medical History:   Diagnosis Date    Arthritis     Asthma     Chronic pain     Diabetes mellitus (HCC)     Fibromyalgia, primary     Headache(784.0) Always    Hiatal hernia     High cholesterol     History of transfusion     Years ago    Hypertension        Past Surgical History:   Procedure Laterality Date     SECTION      CHOLECYSTECTOMY      FL GUIDED NEEDLE PLAC BX/ASP/INJ  10/27/2021    HERNIA REPAIR      KIDNEY SURGERY      LIVER SURGERY           Current Outpatient Medications:     terbinafine (LamISIL) 250 mg tablet, Take 250 mg by mouth daily, Disp: , Rfl:     acetaminophen (TYLENOL) 325 mg tablet, Take 650 mg by mouth every 6 (six) hours as needed for mild pain, Disp: , Rfl:     albuterol (PROVENTIL HFA,VENTOLIN HFA) 90 mcg/act inhaler, Inhale 2 puffs as needed , Disp: , Rfl:     amLODIPine (NORVASC) 10 mg tablet, Take 10 mg by mouth daily, Disp: , Rfl:     ammonium lactate (LAC-HYDRIN) 12 % lotion, Apply topically, Disp: , Rfl:     atorvastatin (LIPITOR) 40 mg tablet, Take 40 mg by mouth daily , Disp: , Rfl:     Azelastine HCl 137 MCG/SPRAY SOLN, , Disp: , Rfl:     budesonide-formoterol (SYMBICORT) 160-4.5 mcg/act inhaler, Inhale 2 puffs 2 (two) times a day (Patient not taking: Reported on 2024), Disp: , Rfl:     Diclofenac Sodium (VOLTAREN) 1 %, Apply 2 g topically 4 (four) times a day, Disp: 2 g, Rfl: 2    DULoxetine (CYMBALTA) 30 mg delayed release capsule, TAKE 1 CAPSULE DAILY, Disp: 30 capsule, Rfl: 6    DULoxetine (CYMBALTA) 60 mg delayed release capsule, Take 1 PO QD., Disp: 30 capsule, Rfl: 6    ergocalciferol (VITAMIN D2) 50,000 units, Take 1 capsule (50,000 Units total) by mouth once a week, Disp: 12 capsule, Rfl: 2    fluticasone-salmeterol (Advair) 250-50 mcg/dose inhaler, Inhale 1 puff 2 (two) times a day, Disp: , Rfl:     fluticasone-umeclidinium-vilanterol  (Trelegy Ellipta) 200-62.5-25 MCG/INH AEPB inhaler, Inhale 1 puff daily, Disp: , Rfl:     folic acid (FOLVITE) 1 mg tablet, Take 1 mg by mouth daily, Disp: , Rfl:     hydrocortisone 2.5 % cream, Apply 1 application. topically 2 (two) times a day To affected area, Disp: , Rfl:     hydrocortisone 2.5 % ointment, Apply topically 2 (two) times a day, Disp: 30 g, Rfl: 3    Lancets (OneTouch Delica Plus [id number]) MISC, , Disp: , Rfl:     lidocaine (Lidoderm) 5 %, Apply 1 patch topically daily Remove & Discard patch within 12 hours or as directed by MD (Patient not taking: Reported on 4/22/2024), Disp: 90 patch, Rfl: 0    lisinopril-hydrochlorothiazide (PRINZIDE,ZESTORETIC) 20-25 MG per tablet, TAKE 1 TABLET BY MOUTH DAILY. PLEASE NOTE DOSE CHANGE, Disp: , Rfl:     meloxicam (Mobic) 15 mg tablet, Take 1 tablet (15 mg total) by mouth daily, Disp: 30 tablet, Rfl: 1    metFORMIN (GLUCOPHAGE) 500 mg tablet, Take 500 mg by mouth 2 (two) times a day with meals, Disp: , Rfl:     metFORMIN (GLUCOPHAGE-XR) 500 mg 24 hr tablet, Take 500 mg by mouth daily with breakfast, Disp: , Rfl:     montelukast (SINGULAIR) 10 mg tablet, TAKE 1 TABLET BY MOUTH EVERY DAY AT NIGHT, Disp: , Rfl:     ofloxacin (OCUFLOX) 0.3 % ophthalmic solution, Apply 1 drop to eye 4 (four) times a day, Disp: , Rfl:     pantoprazole (PROTONIX) 40 mg tablet, Take 40 mg by mouth 2 (two) times a day, Disp: , Rfl:     Polyethylene Glycol 3350 POWD, Take 17 g by mouth daily, Disp: , Rfl:     pregabalin (LYRICA) 100 mg capsule, Take 1 capsule (100 mg total) by mouth 3 (three) times a day, Disp: 90 capsule, Rfl: 6    Semaglutide,0.25 or 0.5MG/DOS, 2 MG/1.5ML SOPN, Inject 0.25 mg under the skin Once a week (Patient not taking: Reported on 4/22/2024), Disp: , Rfl:     tiZANidine (ZANAFLEX) 2 mg tablet, TAKE 1 TAB IN THE MORNING, 1 TAB IN THE AFTERNOON, AND 2 TABS AT BEDTIME, Disp: 360 tablet, Rfl: 5    varenicline 0.5 MG X 11 & 1 MG X 42 tablet therapy pack, USE AS  DIRECTED. GIVE WITH MEALS AND WITH A FULL GLASS OF WATER. (Patient not taking: Reported on 4/22/2024), Disp: , Rfl:     No Known Allergies    Physical Exam:   Vitals:    09/10/24 1131   BP: 121/76   Pulse: 90   Resp: 20   Temp: 98.6 °F (37 °C)   SpO2: 96%     General: Awake, Alert, Oriented x 3, Mood and affect appropriate  Respiratory: Respirations even and unlabored  Cardiovascular: Peripheral pulses intact; no edema  Musculoskeletal Exam: right low back pain    ASA Score: 3    Patient/Chart Verification  Patient ID Verified: Verbal  Consents Confirmed: Procedural, To be obtained in the Pre-Procedure area  H&P( within 30 days) Verified: To be obtained in the Procedural area  Allergies Reviewed: Yes  Anticoag/NSAID held?: NA  Currently on antibiotics?: No  Pregnancy denied?: Yes  Does Patient Have a Prosthetic Device/Implant: No    Assessment:   1. Lumbar spondylosis        Plan: RT L3-L5 RFA ([id number], [id number])

## 2024-09-10 NOTE — DISCHARGE INSTRUCTIONS

## 2024-09-11 NOTE — TELEPHONE ENCOUNTER
S/W pt. Pt stated current pain level is 7-8/10. Pt stated needle sites look good, denies S&S of infection, denies fevers, denies soreness and denies sun burn like sensation.  Advised pt if she does get pain to take her prescribed or OTC pain medications and/or use ice/heat and that it takes 4 to 6 weeks to see the full effect.  Confirmed next appt w/ pt.  Pt verbalized understanding.

## 2024-09-24 ENCOUNTER — HOSPITAL ENCOUNTER (OUTPATIENT)
Dept: RADIOLOGY | Facility: MEDICAL CENTER | Age: 49
Discharge: HOME | End: 2024-09-24
Payer: COMMERCIAL

## 2024-09-24 ENCOUNTER — TELEPHONE (OUTPATIENT)
Dept: PAIN MEDICINE | Facility: MEDICAL CENTER | Age: 49
End: 2024-09-24

## 2024-09-24 VITALS
TEMPERATURE: 97.8 F | DIASTOLIC BLOOD PRESSURE: 92 MMHG | OXYGEN SATURATION: 97 % | SYSTOLIC BLOOD PRESSURE: 147 MMHG | HEART RATE: 86 BPM | RESPIRATION RATE: 20 BRPM

## 2024-09-24 DIAGNOSIS — M47.816 LUMBAR SPONDYLOSIS: ICD-10-CM

## 2024-09-24 PROCEDURE — 64636 DESTROY L/S FACET JNT ADDL: CPT | Performed by: PHYSICAL MEDICINE & REHABILITATION

## 2024-09-24 PROCEDURE — 64635 DESTROY LUMB/SAC FACET JNT: CPT | Performed by: PHYSICAL MEDICINE & REHABILITATION

## 2024-09-24 RX ADMIN — Medication 5 ML: at 11:08

## 2024-09-24 NOTE — DISCHARGE INSTRUCTIONS

## 2024-09-24 NOTE — H&P
History of Present Illness: The patient is a 49 y.o. female who presents with complaints of left low back pain    Past Medical History:   Diagnosis Date    Arthritis     Asthma     Chronic pain     Diabetes mellitus (HCC)     Fibromyalgia, primary     Headache(784.0) Always    Hiatal hernia     High cholesterol     History of transfusion     Years ago    Hypertension        Past Surgical History:   Procedure Laterality Date     SECTION      CHOLECYSTECTOMY      FL GUIDED NEEDLE PLAC BX/ASP/INJ  10/27/2021    HERNIA REPAIR      KIDNEY SURGERY      LIVER SURGERY           Current Outpatient Medications:     acetaminophen (TYLENOL) 325 mg tablet, Take 650 mg by mouth every 6 (six) hours as needed for mild pain, Disp: , Rfl:     albuterol (PROVENTIL HFA,VENTOLIN HFA) 90 mcg/act inhaler, Inhale 2 puffs as needed , Disp: , Rfl:     amLODIPine (NORVASC) 10 mg tablet, Take 10 mg by mouth daily, Disp: , Rfl:     ammonium lactate (LAC-HYDRIN) 12 % lotion, Apply topically, Disp: , Rfl:     atorvastatin (LIPITOR) 40 mg tablet, Take 40 mg by mouth daily , Disp: , Rfl:     Azelastine HCl 137 MCG/SPRAY SOLN, , Disp: , Rfl:     budesonide-formoterol (SYMBICORT) 160-4.5 mcg/act inhaler, Inhale 2 puffs 2 (two) times a day (Patient not taking: Reported on 2024), Disp: , Rfl:     Diclofenac Sodium (VOLTAREN) 1 %, Apply 2 g topically 4 (four) times a day, Disp: 2 g, Rfl: 2    DULoxetine (CYMBALTA) 30 mg delayed release capsule, TAKE 1 CAPSULE DAILY, Disp: 30 capsule, Rfl: 6    DULoxetine (CYMBALTA) 60 mg delayed release capsule, Take 1 PO QD., Disp: 30 capsule, Rfl: 6    ergocalciferol (VITAMIN D2) 50,000 units, Take 1 capsule (50,000 Units total) by mouth once a week, Disp: 12 capsule, Rfl: 2    fluticasone-salmeterol (Advair) 250-50 mcg/dose inhaler, Inhale 1 puff 2 (two) times a day, Disp: , Rfl:     fluticasone-umeclidinium-vilanterol (Trelegy Ellipta) 200-62.5-25 MCG/INH AEPB inhaler, Inhale 1 puff daily, Disp: , Rfl:      folic acid (FOLVITE) 1 mg tablet, Take 1 mg by mouth daily, Disp: , Rfl:     hydrocortisone 2.5 % cream, Apply 1 application. topically 2 (two) times a day To affected area, Disp: , Rfl:     hydrocortisone 2.5 % ointment, Apply topically 2 (two) times a day, Disp: 30 g, Rfl: 3    Lancets (OneTouch Delica Plus [id number]) MISC, , Disp: , Rfl:     lidocaine (Lidoderm) 5 %, Apply 1 patch topically daily Remove & Discard patch within 12 hours or as directed by MD (Patient not taking: Reported on 4/22/2024), Disp: 90 patch, Rfl: 0    lisinopril-hydrochlorothiazide (PRINZIDE,ZESTORETIC) 20-25 MG per tablet, TAKE 1 TABLET BY MOUTH DAILY. PLEASE NOTE DOSE CHANGE, Disp: , Rfl:     meloxicam (Mobic) 15 mg tablet, Take 1 tablet (15 mg total) by mouth daily, Disp: 30 tablet, Rfl: 1    metFORMIN (GLUCOPHAGE) 500 mg tablet, Take 500 mg by mouth 2 (two) times a day with meals, Disp: , Rfl:     metFORMIN (GLUCOPHAGE-XR) 500 mg 24 hr tablet, Take 500 mg by mouth daily with breakfast, Disp: , Rfl:     montelukast (SINGULAIR) 10 mg tablet, TAKE 1 TABLET BY MOUTH EVERY DAY AT NIGHT, Disp: , Rfl:     ofloxacin (OCUFLOX) 0.3 % ophthalmic solution, Apply 1 drop to eye 4 (four) times a day, Disp: , Rfl:     pantoprazole (PROTONIX) 40 mg tablet, Take 40 mg by mouth 2 (two) times a day, Disp: , Rfl:     Polyethylene Glycol 3350 POWD, Take 17 g by mouth daily, Disp: , Rfl:     pregabalin (LYRICA) 100 mg capsule, Take 1 capsule (100 mg total) by mouth 3 (three) times a day, Disp: 90 capsule, Rfl: 6    Semaglutide,0.25 or 0.5MG/DOS, 2 MG/1.5ML SOPN, Inject 0.25 mg under the skin Once a week (Patient not taking: Reported on 4/22/2024), Disp: , Rfl:     terbinafine (LamISIL) 250 mg tablet, Take 250 mg by mouth daily, Disp: , Rfl:     tiZANidine (ZANAFLEX) 2 mg tablet, TAKE 1 TAB IN THE MORNING, 1 TAB IN THE AFTERNOON, AND 2 TABS AT BEDTIME, Disp: 360 tablet, Rfl: 5    varenicline 0.5 MG X 11 & 1 MG X 42 tablet therapy pack, USE AS DIRECTED.  GIVE WITH MEALS AND WITH A FULL GLASS OF WATER. (Patient not taking: Reported on 4/22/2024), Disp: , Rfl:     No Known Allergies    Physical Exam:   Vitals:    09/24/24 1052   BP: 143/84   Pulse: 95   Resp: 18   Temp: 97.8 °F (36.6 °C)   SpO2: 99%     General: Awake, Alert, Oriented x 3, Mood and affect appropriate  Respiratory: Respirations even and unlabored  Cardiovascular: Peripheral pulses intact; no edema  Musculoskeletal Exam: left low back pain    ASA Score: 3    Patient/Chart Verification  Patient ID Verified: Verbal  ID Band Applied: No  Consents Confirmed: Procedural, To be obtained in the Pre-Procedure area  H&P( within 30 days) Verified: To be obtained in the Procedural area  Interval H&P(within 24 hr) Complete (required for Outpatients and Surgery Admit only): To be obtained in the Procedural area  Allergies Reviewed: Yes  Anticoag/NSAID held?: NA  Currently on antibiotics?: No  Pregnancy denied?: NA  Does Patient Have a Prosthetic Device/Implant: No (Pt denies having a pacemaker or ICD)    Assessment:   1. Lumbar spondylosis        Plan: LT L3-L5 RFA ([id number], [id number])]

## 2024-09-25 NOTE — TELEPHONE ENCOUNTER
--FYI--    Pt did well over night no s/s of infection or sunburn sensation.  Aware it takes 2 weeks to notice pain relief and 4-6 weeks for full pain effect to be achieved.  Aware to medicate as previous for discomfort and may use ice or heat.  Current pain level? 8/10  Confirmed next appt.  Call if any questions or concerns prior to next appt.

## 2024-11-06 ENCOUNTER — OFFICE VISIT (OUTPATIENT)
Dept: PAIN MEDICINE | Facility: MEDICAL CENTER | Age: 49
End: 2024-11-06
Payer: COMMERCIAL

## 2024-11-06 VITALS
SYSTOLIC BLOOD PRESSURE: 157 MMHG | HEIGHT: 67 IN | BODY MASS INDEX: 45.99 KG/M2 | HEART RATE: 108 BPM | WEIGHT: 293 LBS | DIASTOLIC BLOOD PRESSURE: 82 MMHG

## 2024-11-06 DIAGNOSIS — M48.062 SPINAL STENOSIS OF LUMBAR REGION WITH NEUROGENIC CLAUDICATION: ICD-10-CM

## 2024-11-06 DIAGNOSIS — M46.1 SACROILIITIS (HCC): Primary | ICD-10-CM

## 2024-11-06 DIAGNOSIS — M54.16 LUMBAR RADICULOPATHY: ICD-10-CM

## 2024-11-06 DIAGNOSIS — M47.816 LUMBAR SPONDYLOSIS: ICD-10-CM

## 2024-11-06 PROCEDURE — 99214 OFFICE O/P EST MOD 30 MIN: CPT

## 2024-11-06 RX ORDER — FLUTICASONE FUROATE, UMECLIDINIUM BROMIDE AND VILANTEROL TRIFENATATE 100; 62.5; 25 UG/1; UG/1; UG/1
1 POWDER RESPIRATORY (INHALATION) DAILY
COMMUNITY
Start: 2024-09-19

## 2024-11-06 RX ORDER — VARENICLINE TARTRATE 1 MG/1
TABLET, FILM COATED ORAL
COMMUNITY
Start: 2024-09-19

## 2024-11-06 RX ORDER — METOPROLOL TARTRATE 50 MG
TABLET ORAL
COMMUNITY
Start: 2024-08-12

## 2024-11-06 RX ORDER — PREGABALIN 75 MG/1
75 CAPSULE ORAL 3 TIMES DAILY
Qty: 90 CAPSULE | Refills: 0 | Status: SHIPPED | OUTPATIENT
Start: 2024-11-06

## 2024-11-06 RX ORDER — OXYCODONE HYDROCHLORIDE 5 MG/1
5 TABLET ORAL EVERY 6 HOURS PRN
COMMUNITY
Start: 2024-10-02

## 2024-11-06 RX ORDER — ATORVASTATIN CALCIUM 80 MG/1
TABLET, FILM COATED ORAL
COMMUNITY
Start: 2024-09-19

## 2024-11-06 NOTE — PROGRESS NOTES
Assessment:  1. Sacroiliitis (HCC)    2. Lumbar radiculopathy    3. Spinal stenosis of lumbar region with neurogenic claudication    4. Lumbar spondylosis        Plan:  Patient is reporting 50% improvement in axial low back pain at this point following recent bilateral L3-L5 medial branch radiofrequency ablations.  Schedule for bilateral sacroiliac joint injections. Complete benefits and risks of this procedure including hyperglycemia, bleeding, infection, local tissue reaction, nerve injury and allergic reaction were discussed at today's visit. The approach was demonstrated using models and literature was provided for home review. The patient was agreeable, verbalized understanding, and wishes to proceed with scheduling the procedure.  Restart lyrica 75mg TID. Previously tolerated 100mg TID without side effects.  She continues with weight loss efforts and states she is in the process of getting insurance approval for an injectable weight loss medication via PCP.   Unfortunately she has failed to respond to epidural steroid injections for management of her lower extremity pain and also has symptoms of neurogenic claudication. We have discussed the fact that these symptoms are not likely to improve with conservative care and would require operative management. Will update lumbar spine MRI to evaluate for progression of stenosis. Patient not willing to proceed with surgical consult at this time. She would not likely be a surgical candidate due to her weight, diabetes, and current tobacco use unfortunately.  Follow up after injection, or sooner if needed.    Had lengthy discussion today with the patient who understands the plan will be to continue injection therapy for low back pain and attempt to manage radicular symptoms pharmacologically. She should prioritize weight loss which should also improve her symptoms and blood glucose control.     My impressions and treatment recommendations were discussed in detail with  the patient who verbalized understanding and had no further questions.  Discharge instructions were provided. I personally saw and examined the patient and I agree with the above discussed plan of care.    Orders Placed This Encounter   Procedures    FL spine and pain procedure     Standing Status:   Future     Standing Expiration Date:   11/6/2028     Order Specific Question:   Reason for Exam:     Answer:   Bilateral SIJ injections     Order Specific Question:   Is the patient pregnant?     Answer:   No     Order Specific Question:   Anticoagulant hold needed?     Answer:   No    MRI lumbar spine wo contrast     Wide bore necessary     Standing Status:   Future     Standing Expiration Date:   11/6/2028     Scheduling Instructions:      There is no preparation for this test. Please leave your jewelry and valuables at home, wedding rings are the exception. All patients will be required to change into a hospital gown and pants.  Street clothes are not permitted in the MRI.  Magnetic nail polish must be removed prior to arrival for your test. Please bring your insurance cards, a form of photo ID and a list of your medications with you. Arrive 15 minutes prior to your appointment time in order to register. Please bring any prior CT or MRI studies of this area that were not performed at a [facility].            To schedule this appointment, please contact Central Scheduling at [phone].            Prior to your appointment, please make sure you complete the MRI Screening Form when you e-Check in for your appointment. This will be available starting 7 days before your appointment in [employer]. You may receive an e-mail with an activation code if you do not have a [employer] account. If you do not have access to a device, we will complete your screening at your appointment.     Order Specific Question:   Reason for Exam     Answer:   Lumbar radiculopathy; neurogenic claudication     Order Specific Question:   Is  the patient pregnant?     Answer:   No     Order Specific Question:   What is the patient's sedation requirement?     Answer:   No Sedation     Order Specific Question:   Does the patient need medication for Claustrophobia? If yes, order medication at this point.     Answer:   No     Order Specific Question:   Does the patient wear a life vest, have an implanted cardiac device, a stimulation device, a sleep apnea stimulator, or a breast tissue expansion device?     Answer:   No     Order Specific Question:   Release to patient through Mychart     Answer:   Immediate     New Medications Ordered This Visit   Medications    metoprolol tartrate (LOPRESSOR) 50 mg tablet     Sig: Take 2 tablets (100 mg) one hour prior to CTA coronary scan    oxyCODONE (ROXICODONE) 5 immediate release tablet     Sig: Take 5 mg by mouth every 6 (six) hours as needed    atorvastatin (LIPITOR) 80 mg tablet     Sig: take 1 tablet by mouth every day at night    Trelegy Ellipta 100-62.5-25 MCG/ACT inhaler     Sig: Inhale 1 puff daily    varenicline (CHANTIX) 1 mg tablet     Sig: TAKE 1 TABLET BY MOUTH 2 TIMES A DAY. TAKE WITH FULL GLASS OF WATER.    pregabalin (LYRICA) 75 mg capsule     Sig: Take 1 capsule (75 mg total) by mouth 3 (three) times a day     Dispense:  90 capsule     Refill:  0       History of Present Illness:  Karon Rosado is a 49 y.o. female who presents for a follow up office visit after undergoing bilateral L3-L5 radiofrequency ablations.  Patient reports 50% improvement in her axial pain localized to the lumbar region.  She is happy with this result but continues to experience severe pain below the level of the ablation.  This is localized to the lumbosacral region of the back with some referral into the hips bilaterally and buttocks.  Her pain is constant and presently rated a 10/10 in intensity.  She describes the quality of her pain as sharp, pressure-like, and shooting.    Patient also complaining today of severe left  lower extremity pain as well as severe throbbing, numbness, and pain in the bilateral lower extremities distally which occurs when she walks even for short distances.  We discussed the fact that this is related to her multilevel spinal stenosis and that these are symptoms of neurogenic claudication.  Explained to the patient that this does not tend to improve with epidural steroid injections.  She has had lumbar epidurals in the past which have not alleviated her typical chronic pain pattern.  We had a lengthy discussion regarding this.  Advised patient that I feel it is unreasonable to proceed with additional epidural steroid injections at this point.  The next step would be to consider operative management which would be much more beneficial for her.  She does not wish to meet with a spine surgeon at this time and would not likely be a surgical candidate due to her BMI, diabetes, and current tobacco use.  She is attempting to manage her weight and receiving assistance with this via her PCP.  She would like to try an injectable weight loss medication.  We did discuss a referral to weight management for assistance with this, however patient declined.  She understands that we will attempt to manage her lower extremity pain pharmacologically moving forward as an alternative to injection therapy.  She has taken Lyrica in the past without side effects however this was discontinued, reason unknown.     I have personally reviewed and/or updated the patient's past medical history, past surgical history, family history, social history, current medications, allergies, and vital signs today.     Review of Systems   Respiratory:  Positive for shortness of breath.    Cardiovascular:  Negative for chest pain.   Gastrointestinal:  Negative for constipation, diarrhea, nausea and vomiting.   Musculoskeletal:  Positive for back pain, gait problem, joint swelling and myalgias. Negative for arthralgias.   Skin:  Negative for rash.    Neurological:  Positive for weakness. Negative for dizziness and seizures.   All other systems reviewed and are negative.      Patient Active Problem List   Diagnosis    Tear of medial meniscus of left knee, current    Primary osteoarthritis of left knee    Wheezing    Class 3 severe obesity with body mass index (BMI) of 40.0 to 44.9 in adult ([facility])    Tobacco use    Chronic bilateral low back pain without sciatica    Lumbar spondylosis    Primary osteoarthritis of knees, bilateral    Pain of both wrist joints    Bilateral carpal tunnel syndrome    Chronic pain of both knees    Diffuse pain    Chronic pain syndrome    Myofascial pain syndrome    Fibromyalgia, primary    Type 2 diabetes mellitus without complication, without long-term current use of insulin ([facility])    Lumbar radiculitis    Bilateral primary osteoarthritis of knee       Past Medical History:   Diagnosis Date    Arthritis     Asthma     Chronic pain     Diabetes mellitus ([facility])     Fibromyalgia, primary     Headache(784.0) Always    Hiatal hernia     High cholesterol     History of transfusion     Years ago    Hypertension        Past Surgical History:   Procedure Laterality Date     SECTION      CHOLECYSTECTOMY      FL GUIDED NEEDLE PLAC BX/ASP/INJ  10/27/2021    HERNIA REPAIR      KIDNEY SURGERY      LIVER SURGERY         Family History   Problem Relation Age of Onset    Cancer Mother     Arthritis Mother     Osteoporosis Mother     Diabetes Father     Heart disease Father     Cancer Father        Social History     Occupational History    Not on file   Tobacco Use    Smoking status: Light Smoker     Current packs/day: 0.50     Average packs/day: 0.5 packs/day for 25.0 years (12.5 ttl pk-yrs)     Types: Cigarettes    Smokeless tobacco: Never    Tobacco comments:     in process of quitting 19   Vaping Use    Vaping status: Never Used   Substance and Sexual Activity    Alcohol use: No    Drug use: No    Sexual activity: Not on file        Current Outpatient Medications on File Prior to Visit   Medication Sig    acetaminophen (TYLENOL) 325 mg tablet Take 650 mg by mouth every 6 (six) hours as needed for mild pain    albuterol (PROVENTIL HFA,VENTOLIN HFA) 90 mcg/act inhaler Inhale 2 puffs as needed     amLODIPine (NORVASC) 10 mg tablet Take 10 mg by mouth daily    atorvastatin (LIPITOR) 40 mg tablet Take 40 mg by mouth daily     atorvastatin (LIPITOR) 80 mg tablet take 1 tablet by mouth every day at night    Azelastine HCl 137 MCG/SPRAY SOLN     fluticasone-salmeterol (Advair) 250-50 mcg/dose inhaler Inhale 1 puff 2 (two) times a day    fluticasone-umeclidinium-vilanterol (Trelegy Ellipta) 200-62.5-25 MCG/INH AEPB inhaler Inhale 1 puff daily    folic acid (FOLVITE) 1 mg tablet Take 1 mg by mouth daily    hydrocortisone 2.5 % cream Apply 1 application. topically 2 (two) times a day To affected area    lisinopril-hydrochlorothiazide (PRINZIDE,ZESTORETIC) 20-25 MG per tablet TAKE 1 TABLET BY MOUTH DAILY. PLEASE NOTE DOSE CHANGE    metFORMIN (GLUCOPHAGE) 500 mg tablet Take 500 mg by mouth 2 (two) times a day with meals    metFORMIN (GLUCOPHAGE-XR) 500 mg 24 hr tablet Take 500 mg by mouth daily with breakfast    metoprolol tartrate (LOPRESSOR) 50 mg tablet Take 2 tablets (100 mg) one hour prior to CTA coronary scan    montelukast (SINGULAIR) 10 mg tablet TAKE 1 TABLET BY MOUTH EVERY DAY AT NIGHT    ofloxacin (OCUFLOX) 0.3 % ophthalmic solution Apply 1 drop to eye 4 (four) times a day    oxyCODONE (ROXICODONE) 5 immediate release tablet Take 5 mg by mouth every 6 (six) hours as needed    pantoprazole (PROTONIX) 40 mg tablet Take 40 mg by mouth 2 (two) times a day    Polyethylene Glycol 3350 POWD Take 17 g by mouth daily    tiZANidine (ZANAFLEX) 2 mg tablet TAKE 1 TAB IN THE MORNING, 1 TAB IN THE AFTERNOON, AND 2 TABS AT BEDTIME    Trelegy Ellipta 100-62.5-25 MCG/ACT inhaler Inhale 1 puff daily    varenicline (CHANTIX) 1 mg tablet TAKE 1 TABLET BY MOUTH  "2 TIMES A DAY. TAKE WITH FULL GLASS OF WATER.    varenicline 0.5 MG X 11 & 1 MG X 42 tablet therapy pack     [DISCONTINUED] pregabalin (LYRICA) 100 mg capsule Take 1 capsule (100 mg total) by mouth 3 (three) times a day    ammonium lactate (LAC-HYDRIN) 12 % lotion Apply topically    budesonide-formoterol (SYMBICORT) 160-4.5 mcg/act inhaler Inhale 2 puffs 2 (two) times a day (Patient not taking: Reported on 4/22/2024)    Diclofenac Sodium (VOLTAREN) 1 % Apply 2 g topically 4 (four) times a day (Patient not taking: Reported on 11/6/2024)    DULoxetine (CYMBALTA) 30 mg delayed release capsule TAKE 1 CAPSULE DAILY (Patient not taking: Reported on 11/6/2024)    DULoxetine (CYMBALTA) 60 mg delayed release capsule Take 1 PO QD. (Patient not taking: Reported on 11/6/2024)    ergocalciferol (VITAMIN D2) 50,000 units Take 1 capsule (50,000 Units total) by mouth once a week (Patient not taking: Reported on 11/6/2024)    hydrocortisone 2.5 % ointment Apply topically 2 (two) times a day (Patient not taking: Reported on 11/6/2024)    Lancets (OneTouch Delica Plus [id number]) MISC  (Patient not taking: Reported on 11/6/2024)    lidocaine (Lidoderm) 5 % Apply 1 patch topically daily Remove & Discard patch within 12 hours or as directed by MD (Patient not taking: Reported on 4/22/2024)    meloxicam (Mobic) 15 mg tablet Take 1 tablet (15 mg total) by mouth daily (Patient not taking: Reported on 11/6/2024)    Semaglutide,0.25 or 0.5MG/DOS, 2 MG/1.5ML SOPN Inject 0.25 mg under the skin Once a week (Patient not taking: Reported on 4/22/2024)     No current facility-administered medications on file prior to visit.       No Known Allergies    Physical Exam:    /82   Pulse (!) 108   Ht 5' 7\" (1.702 m)   Wt 135 kg (297 lb)   BMI 46.52 kg/m²     Constitutional:normal, well developed, well nourished, alert, in no distress and non-toxic and no overt pain behavior.  Eyes:anicteric  HEENT:grossly intact  Neck:supple, symmetric, trachea " midline and no masses   Pulmonary:even and unlabored  Cardiovascular:No edema or pitting edema present  Skin:Normal without rashes or lesions and well hydrated  Psychiatric:Mood and affect appropriate  Neurologic:Cranial Nerves II-XII grossly intact  Musculoskeletal:normal, except for severe tenderness to palpation over the bilateral sacroiliac joints. Ambulates with a cane. Antalgic gait. Diffusely diminished strength of the bilateral LE, possibly due to pain level at time of exam. Positive straight leg raise from a seated position on the left. Equivocal on the right.     Special Tests:  Left Gary's Maneuver:  positive  Right Gary's Maneuver:  positive  Left Gaenslen's Test:  positive  Right Gaenslen's Test:  positive  Left Pelvic Distraction Test:  positive  Right Pelvic Distraction Test:  positive   + fabián finger sign bilaterally.

## 2024-12-03 ENCOUNTER — HOSPITAL ENCOUNTER (OUTPATIENT)
Dept: RADIOLOGY | Facility: MEDICAL CENTER | Age: 49
Discharge: HOME | End: 2024-12-03
Payer: COMMERCIAL

## 2024-12-03 VITALS
RESPIRATION RATE: 18 BRPM | OXYGEN SATURATION: 96 % | TEMPERATURE: 97.9 F | DIASTOLIC BLOOD PRESSURE: 93 MMHG | HEART RATE: 97 BPM | SYSTOLIC BLOOD PRESSURE: 151 MMHG

## 2024-12-03 DIAGNOSIS — M46.1 SACROILIITIS (HCC): ICD-10-CM

## 2024-12-03 PROCEDURE — 27096 INJECT SACROILIAC JOINT: CPT | Performed by: PHYSICAL MEDICINE & REHABILITATION

## 2024-12-03 RX ORDER — METHYLPREDNISOLONE ACETATE 40 MG/ML
80 INJECTION, SUSPENSION INTRA-ARTICULAR; INTRALESIONAL; INTRAMUSCULAR; PARENTERAL; SOFT TISSUE ONCE
Status: COMPLETED | OUTPATIENT
Start: 2024-12-03 | End: 2024-12-03

## 2024-12-03 RX ORDER — ROPIVACAINE HYDROCHLORIDE 2 MG/ML
3 INJECTION, SOLUTION EPIDURAL; INFILTRATION; PERINEURAL ONCE
Status: COMPLETED | OUTPATIENT
Start: 2024-12-03 | End: 2024-12-03

## 2024-12-03 RX ADMIN — ROPIVACAINE HYDROCHLORIDE 3 ML: 2 INJECTION, SOLUTION EPIDURAL; INFILTRATION at 16:44

## 2024-12-03 RX ADMIN — METHYLPREDNISOLONE ACETATE 80 MG: 40 INJECTION, SUSPENSION INTRA-ARTICULAR; INTRALESIONAL; INTRAMUSCULAR; SOFT TISSUE at 16:44

## 2024-12-03 RX ADMIN — IOHEXOL 1 ML: 300 INJECTION, SOLUTION INTRAVENOUS at 16:44

## 2024-12-03 NOTE — H&P
History of Present Illness: The patient is a 49 y.o. female who presents with complaints of bilateral lower back pain    Past Medical History:   Diagnosis Date    Arthritis     Asthma     Chronic pain     Diabetes mellitus (HCC)     Fibromyalgia, primary     Headache(784.0) Always    Hiatal hernia     High cholesterol     History of transfusion     Years ago    Hypertension        Past Surgical History:   Procedure Laterality Date     SECTION      CHOLECYSTECTOMY      FL GUIDED NEEDLE PLAC BX/ASP/INJ  10/27/2021    HERNIA REPAIR      KIDNEY SURGERY      LIVER SURGERY           Current Outpatient Medications:     acetaminophen (TYLENOL) 325 mg tablet, Take 650 mg by mouth every 6 (six) hours as needed for mild pain, Disp: , Rfl:     albuterol (PROVENTIL HFA,VENTOLIN HFA) 90 mcg/act inhaler, Inhale 2 puffs as needed , Disp: , Rfl:     amLODIPine (NORVASC) 10 mg tablet, Take 10 mg by mouth daily, Disp: , Rfl:     ammonium lactate (LAC-HYDRIN) 12 % lotion, Apply topically, Disp: , Rfl:     atorvastatin (LIPITOR) 40 mg tablet, Take 40 mg by mouth daily , Disp: , Rfl:     atorvastatin (LIPITOR) 80 mg tablet, take 1 tablet by mouth every day at night, Disp: , Rfl:     Azelastine HCl 137 MCG/SPRAY SOLN, , Disp: , Rfl:     budesonide-formoterol (SYMBICORT) 160-4.5 mcg/act inhaler, Inhale 2 puffs 2 (two) times a day (Patient not taking: Reported on 2024), Disp: , Rfl:     Diclofenac Sodium (VOLTAREN) 1 %, Apply 2 g topically 4 (four) times a day (Patient not taking: Reported on 2024), Disp: 2 g, Rfl: 2    DULoxetine (CYMBALTA) 30 mg delayed release capsule, TAKE 1 CAPSULE DAILY (Patient not taking: Reported on 2024), Disp: 30 capsule, Rfl: 6    DULoxetine (CYMBALTA) 60 mg delayed release capsule, Take 1 PO QD. (Patient not taking: Reported on 2024), Disp: 30 capsule, Rfl: 6    ergocalciferol (VITAMIN D2) 50,000 units, Take 1 capsule (50,000 Units total) by mouth once a week (Patient not taking:  Reported on 11/6/2024), Disp: 12 capsule, Rfl: 2    fluticasone-salmeterol (Advair) 250-50 mcg/dose inhaler, Inhale 1 puff 2 (two) times a day, Disp: , Rfl:     fluticasone-umeclidinium-vilanterol (Trelegy Ellipta) 200-62.5-25 MCG/INH AEPB inhaler, Inhale 1 puff daily, Disp: , Rfl:     folic acid (FOLVITE) 1 mg tablet, Take 1 mg by mouth daily, Disp: , Rfl:     hydrocortisone 2.5 % cream, Apply 1 application. topically 2 (two) times a day To affected area, Disp: , Rfl:     hydrocortisone 2.5 % ointment, Apply topically 2 (two) times a day (Patient not taking: Reported on 11/6/2024), Disp: 30 g, Rfl: 3    Lancets (OneTouch Delica Plus [id number]) MISC, , Disp: , Rfl:     lidocaine (Lidoderm) 5 %, Apply 1 patch topically daily Remove & Discard patch within 12 hours or as directed by MD (Patient not taking: Reported on 4/22/2024), Disp: 90 patch, Rfl: 0    lisinopril-hydrochlorothiazide (PRINZIDE,ZESTORETIC) 20-25 MG per tablet, TAKE 1 TABLET BY MOUTH DAILY. PLEASE NOTE DOSE CHANGE, Disp: , Rfl:     meloxicam (Mobic) 15 mg tablet, Take 1 tablet (15 mg total) by mouth daily (Patient not taking: Reported on 11/6/2024), Disp: 30 tablet, Rfl: 1    metFORMIN (GLUCOPHAGE) 500 mg tablet, Take 500 mg by mouth 2 (two) times a day with meals, Disp: , Rfl:     metFORMIN (GLUCOPHAGE-XR) 500 mg 24 hr tablet, Take 500 mg by mouth daily with breakfast, Disp: , Rfl:     metoprolol tartrate (LOPRESSOR) 50 mg tablet, Take 2 tablets (100 mg) one hour prior to CTA coronary scan, Disp: , Rfl:     montelukast (SINGULAIR) 10 mg tablet, TAKE 1 TABLET BY MOUTH EVERY DAY AT NIGHT, Disp: , Rfl:     ofloxacin (OCUFLOX) 0.3 % ophthalmic solution, Apply 1 drop to eye 4 (four) times a day, Disp: , Rfl:     oxyCODONE (ROXICODONE) 5 immediate release tablet, Take 5 mg by mouth every 6 (six) hours as needed, Disp: , Rfl:     pantoprazole (PROTONIX) 40 mg tablet, Take 40 mg by mouth 2 (two) times a day, Disp: , Rfl:     Polyethylene Glycol 3350 POWD,  Take 17 g by mouth daily, Disp: , Rfl:     pregabalin (LYRICA) 75 mg capsule, Take 1 capsule (75 mg total) by mouth 3 (three) times a day, Disp: 90 capsule, Rfl: 0    Semaglutide,0.25 or 0.5MG/DOS, 2 MG/1.5ML SOPN, Inject 0.25 mg under the skin Once a week (Patient not taking: Reported on 4/22/2024), Disp: , Rfl:     tiZANidine (ZANAFLEX) 2 mg tablet, TAKE 1 TAB IN THE MORNING, 1 TAB IN THE AFTERNOON, AND 2 TABS AT BEDTIME, Disp: 360 tablet, Rfl: 5    Trelegy Ellipta 100-62.5-25 MCG/ACT inhaler, Inhale 1 puff daily, Disp: , Rfl:     varenicline (CHANTIX) 1 mg tablet, TAKE 1 TABLET BY MOUTH 2 TIMES A DAY. TAKE WITH FULL GLASS OF WATER., Disp: , Rfl:     varenicline 0.5 MG X 11 & 1 MG X 42 tablet therapy pack, , Disp: , Rfl:     No Known Allergies    Physical Exam:   Vitals:    12/03/24 1635   BP: 144/92   Pulse: 87   Resp: 20   Temp: 97.9 °F (36.6 °C)   SpO2: 97%     General: Awake, Alert, Oriented x 3, Mood and affect appropriate  Respiratory: Respirations even and unlabored  Cardiovascular: Peripheral pulses intact; no edema  Musculoskeletal Exam: bilateral lower back pain    ASA Score: 3    Patient/Chart Verification  Patient ID Verified: Verbal  Consents Confirmed: Procedural, To be obtained in the Pre-Procedure area  H&P( within 30 days) Verified: To be obtained in the Procedural area  Allergies Reviewed: Yes  Anticoag/NSAID held?: NA  Currently on antibiotics?: No  Pregnancy denied?: Yes    Assessment:   1. Sacroiliitis (HCC)        Plan: Bilateral SIJ injections

## 2024-12-03 NOTE — DISCHARGE INSTRUCTIONS

## 2024-12-10 ENCOUNTER — HOSPITAL ENCOUNTER (OUTPATIENT)
Dept: RADIOLOGY | Facility: IMAGING CENTER | Age: 49
Discharge: HOME | End: 2024-12-10

## 2024-12-10 DIAGNOSIS — M54.16 LUMBAR RADICULOPATHY: ICD-10-CM

## 2024-12-10 DIAGNOSIS — M48.062 SPINAL STENOSIS OF LUMBAR REGION WITH NEUROGENIC CLAUDICATION: ICD-10-CM

## 2024-12-17 ENCOUNTER — TELEPHONE (OUTPATIENT)
Dept: RADIOLOGY | Facility: MEDICAL CENTER | Age: 49
End: 2024-12-17

## 2025-01-23 DIAGNOSIS — M79.18 MYOFASCIAL PAIN SYNDROME: ICD-10-CM

## 2025-01-23 DIAGNOSIS — G89.4 CHRONIC PAIN SYNDROME: ICD-10-CM

## 2025-01-23 DIAGNOSIS — M17.0 PRIMARY OSTEOARTHRITIS OF KNEES, BILATERAL: ICD-10-CM

## 2025-01-23 DIAGNOSIS — R52 DIFFUSE PAIN: ICD-10-CM

## 2025-01-23 DIAGNOSIS — M25.562 CHRONIC PAIN OF BOTH KNEES: ICD-10-CM

## 2025-01-23 DIAGNOSIS — M54.50 CHRONIC BILATERAL LOW BACK PAIN WITHOUT SCIATICA: ICD-10-CM

## 2025-01-23 DIAGNOSIS — G89.29 CHRONIC BILATERAL LOW BACK PAIN WITHOUT SCIATICA: ICD-10-CM

## 2025-01-23 DIAGNOSIS — M25.561 CHRONIC PAIN OF BOTH KNEES: ICD-10-CM

## 2025-01-23 DIAGNOSIS — M47.816 LUMBAR SPONDYLOSIS: ICD-10-CM

## 2025-01-23 DIAGNOSIS — G89.29 CHRONIC PAIN OF BOTH KNEES: ICD-10-CM

## 2025-01-24 RX ORDER — TIZANIDINE 2 MG/1
TABLET ORAL
Qty: 360 TABLET | Refills: 5 | OUTPATIENT
Start: 2025-01-24

## 2025-01-31 DIAGNOSIS — G89.4 CHRONIC PAIN SYNDROME: ICD-10-CM

## 2025-01-31 DIAGNOSIS — M17.0 PRIMARY OSTEOARTHRITIS OF KNEES, BILATERAL: ICD-10-CM

## 2025-01-31 DIAGNOSIS — M25.561 CHRONIC PAIN OF BOTH KNEES: ICD-10-CM

## 2025-01-31 DIAGNOSIS — M25.562 CHRONIC PAIN OF BOTH KNEES: ICD-10-CM

## 2025-01-31 DIAGNOSIS — G89.29 CHRONIC BILATERAL LOW BACK PAIN WITHOUT SCIATICA: ICD-10-CM

## 2025-01-31 DIAGNOSIS — M54.50 CHRONIC BILATERAL LOW BACK PAIN WITHOUT SCIATICA: ICD-10-CM

## 2025-01-31 DIAGNOSIS — G89.29 CHRONIC PAIN OF BOTH KNEES: ICD-10-CM

## 2025-01-31 DIAGNOSIS — R52 DIFFUSE PAIN: ICD-10-CM

## 2025-01-31 DIAGNOSIS — M47.816 LUMBAR SPONDYLOSIS: ICD-10-CM

## 2025-01-31 DIAGNOSIS — M79.18 MYOFASCIAL PAIN SYNDROME: ICD-10-CM

## 2025-01-31 RX ORDER — TIZANIDINE 2 MG/1
TABLET ORAL
Qty: 360 TABLET | Refills: 5 | OUTPATIENT
Start: 2025-01-31